# Patient Record
Sex: FEMALE | Race: WHITE | NOT HISPANIC OR LATINO | Employment: FULL TIME | URBAN - METROPOLITAN AREA
[De-identification: names, ages, dates, MRNs, and addresses within clinical notes are randomized per-mention and may not be internally consistent; named-entity substitution may affect disease eponyms.]

---

## 2018-08-30 PROBLEM — E66.813 OBESITY, CLASS III, BMI 40-49.9 (MORBID OBESITY): Status: ACTIVE | Noted: 2018-08-30

## 2018-08-30 PROBLEM — E66.01 OBESITY, CLASS III, BMI 40-49.9 (MORBID OBESITY) (HCC): Status: ACTIVE | Noted: 2018-08-30

## 2018-08-30 NOTE — ASSESSMENT & PLAN NOTE
-Discussed options of [facility]-Intensive Lifestyle Intervention Program, Very Low Calorie Diet-VLCD, Conservative Program, Liset-En-Y Gastric Bypass and Vertical Sleeve Gastrectomy and the role of weight loss medications   -Initial weight loss goal of 5-10% weight loss for improved health  -Screening labs   Recommend checking lab coverage before having labs drawn   -Tsh,cmp, lipid ordered  -Patient is interested in pursuing [facility]-Intensive Lifestyle Intervention Program

## 2018-08-30 NOTE — PROGRESS NOTES
Assessment/Plan:    Obesity, Class III, BMI 40-49 9 (morbid obesity) ([place] [address] )  -Discussed options of [facility]-Intensive Lifestyle Intervention Program, Very Low Calorie Diet-VLCD, Conservative Program, Liset-En-Y Gastric Bypass and Vertical Sleeve Gastrectomy and the role of weight loss medications   -Initial weight loss goal of 5-10% weight loss for improved health  -Screening labs  Recommend checking lab coverage before having labs drawn   -Tsh,cmp, lipid ordered  -Patient is interested in pursuing [facility]-Intensive Lifestyle Intervention Program    Diabetes mellitus ([place] [address] )  -Controlled with metformin and tradjenta  -should improve with weight loss, dietary, and lifestyle changes        At risk for sleep apnea  - sleep medicine referral placed   -Discussed risks of untreated sleep apnea such as sudden cardiac death by arrhythmia, uncontrolled hypertension, difficulty with weight loss, decreased quality sleep, increased insulin resistance, and stroke  -Should improve with dietary and lifestyle changes        Hypertension  -controlled with amlodipine, bisoprolol, edarbyclor  -should improve with weight loss, dietary, and lifestyle changes      Depression  -controlled with lamictal and effexor  -xanax used for sleeping       Follow up for vlcd start after labs are received  Patient to call if she does not hear about labs and scheduling vlcd start within a week  Would like to do healthy core after VLCD  List of counselors given to patient today  Asked patient to schedule an appt for counseling now so they can follow her throughout this journey  Diagnoses and all orders for this visit:    Obesity, Class III, BMI 40-49 9 (morbid obesity) ([facility])  -     TSH, 3rd generation with T4 reflex; Future  -     Comprehensive metabolic panel; Future  -     Lipid panel; Future  -     Ambulatory referral to Sleep Medicine;  Future    Type 2 diabetes mellitus with complication, without long-term current use of insulin ([place] [address] )  - TSH, 3rd generation with T4 reflex; Future  -     Comprehensive metabolic panel; Future  -     Lipid panel; Future  -     Ambulatory referral to Sleep Medicine; Future    Abnormal weight gain  -     TSH, 3rd generation with T4 reflex; Future  -     Comprehensive metabolic panel; Future  -     Lipid panel; Future  -     Ambulatory referral to Sleep Medicine; Future    At risk for obstructive sleep apnea  -     Ambulatory referral to Sleep Medicine; Future    Hypertension, unspecified type    Depression, unspecified depression type    Other orders  -     amLODIPine (NORVASC) 10 mg tablet; Take 10 mg by mouth  -     EDARBYCLOR 40-25 MG TABS;   -     albuterol (PROVENTIL HFA,VENTOLIN HFA) 90 mcg/act inhaler; Inhale  -     ALPRAZolam (XANAX) 0 25 mg tablet; alprazolam 0 25 mg tablet  -     bisoprolol (ZEBETA) 10 MG tablet;   -     glucose blood test strip; Check blood sugar QID  -     Loratadine (CLARITIN) 10 MG CAPS; Take by mouth  -     diclofenac (VOLTAREN) 75 mg EC tablet;   -     venlafaxine (EFFEXOR) 100 MG tablet; Take 225 mg by mouth daily  -     Sodium Hyaluronate (EUFLEXXA) 20 MG/2ML SOSY; knee injection  -     lamoTRIgine (LaMICtal) 200 MG tablet;   -     TRADJENTA 5 MG TABS;   -     metFORMIN (GLUCOPHAGE) 500 mg tablet; Take 500 mg by mouth  -     methylPREDNISolone acetate (DEPO-MEDROL) 40 mg/mL injection; 1 mL          Subjective:   Chief Complaint   Patient presents with   [address] [place] [address] patient here for MWM consult  Patient ID: Mile Genao  is a 50 y o  female with excess weight/obesity here to pursue weight management  Past Medical History:   Diagnosis Date    Acute bronchitis     Asthma     Diabetes mellitus ([facility] [address] )     Hypertension        HPI:  Obesity/Excess Weight:  Severity: Severe  Onset: For the last 25 years    Modifiers: Diet and Exercise, Physician Supervised Weight Loss Program, Commercial Weight Loss Programs-ie   Weight Watchers, [employer], [employer]ystem, etc  and Prescription Weight Loss Medications-phentermine   Contributing factors: Poor Food Choices, Insufficient Physical Activity and portion size and never feeling full and always wanting to eat   Associated symptoms: comorbid conditions, increased joint pain, decreased exercise capacity, decreased mobility and needs to lose weight because of excessive knee pain and possible knee replacement    Goals:180-200 lbs  Patient lost 90 pounds in 2010 through [email or web address]  Notes when he lost the weight she struggled with self image issues and notes she didn't feel like herself  She notes she felt vulnerable and exposed  She notes she felt like she wanted to regain the weight because she felt so out of her own body  She notes she feels like she will need counseling this time  Hydration: 5 bottles of water per day  3-5 cups of coffee per day with half and half  Alcohol: 2-3 drinks per month     The following portions of the patient's history were reviewed and updated as appropriate: allergies, current medications, past family history, past medical history, past social history, past surgical history and problem list     Review of Systems   HENT: Negative for sore throat  Respiratory: Negative for cough and shortness of breath  Cardiovascular: Negative for chest pain and palpitations  Gastrointestinal: Negative for abdominal pain, constipation, diarrhea, nausea and vomiting  Denies GERD   Endocrine: Negative for cold intolerance and heat intolerance  Genitourinary: Negative for dysuria  Musculoskeletal: Positive for arthralgias (bilateral knees )  Skin: Negative for rash  Neurological: Negative for headaches  Psychiatric/Behavioral: Negative for suicidal ideas (denies HI)          +depression --controlled  Denies anxiety       Objective:    /90 (BP Location: Left arm, Patient Position: Sitting, Cuff Size: Large)   Pulse 80   Temp 98 6 °F (37 °C) (Tympanic)   Resp 16   Ht 5' 4 5" (1 638 m) Wt 123 kg (270 lb 11 2 oz)   BMI 45 75 kg/m²     Physical Exam   Nursing note and vitals reviewed  Constitutional   General appearance: Abnormal   well developed and morbidly obese  Eyes No conjunctival pallor  Ears, Nose, Mouth, and Throat Oral mucosa moist    Pulmonary   Respiratory effort: No increased work of breathing or signs of respiratory distress  Auscultation of lungs: Clear to auscultation, equal breath sounds bilaterally, no wheezes, no rales, no rhonci  Cardiovascular   Auscultation of heart: Normal rate and rhythm, normal S1 and S2, without murmurs  Examination of extremities for edema and/or varicosities: Normal   no edema  Abdomen   Abdomen: Abnormal   The abdomen was obese  Bowel sounds were normal  The abdomen was soft and nontender  Musculoskeletal   Gait and station: Normal     Psychiatric   Orientation to person, place and time: Normal     Affect: appropriate    45 minute visit, >50% time spent counseling patient on surgical and nonsurgical interventions for the treatment of excess weight  Discussed the advantages and long-term outcomes with regards to bariatric surgery  Discussed in detail nonsurgical options including intensive lifestyle intervention program, very low-calorie diet program and conservative program   Discussed the role of weight loss medications  Counseled patient on diet behavior and exercise modification for weight loss

## 2018-08-31 ENCOUNTER — OFFICE VISIT (OUTPATIENT)
Dept: BARIATRICS | Facility: CLINIC | Age: 53
End: 2018-08-31
Payer: COMMERCIAL

## 2018-08-31 VITALS
RESPIRATION RATE: 16 BRPM | WEIGHT: 270.7 LBS | DIASTOLIC BLOOD PRESSURE: 90 MMHG | HEIGHT: 65 IN | SYSTOLIC BLOOD PRESSURE: 132 MMHG | TEMPERATURE: 98.6 F | HEART RATE: 80 BPM | BODY MASS INDEX: 45.1 KG/M2

## 2018-08-31 DIAGNOSIS — E11.8 TYPE 2 DIABETES MELLITUS WITH COMPLICATION, WITHOUT LONG-TERM CURRENT USE OF INSULIN (HCC): ICD-10-CM

## 2018-08-31 DIAGNOSIS — I10 HYPERTENSION, UNSPECIFIED TYPE: ICD-10-CM

## 2018-08-31 DIAGNOSIS — E66.01 OBESITY, CLASS III, BMI 40-49.9 (MORBID OBESITY) (HCC): Primary | ICD-10-CM

## 2018-08-31 DIAGNOSIS — F32.A DEPRESSION, UNSPECIFIED DEPRESSION TYPE: ICD-10-CM

## 2018-08-31 DIAGNOSIS — Z91.89 AT RISK FOR OBSTRUCTIVE SLEEP APNEA: ICD-10-CM

## 2018-08-31 DIAGNOSIS — R63.5 ABNORMAL WEIGHT GAIN: ICD-10-CM

## 2018-08-31 PROBLEM — E11.9 DIABETES MELLITUS (HCC): Status: ACTIVE | Noted: 2018-08-31

## 2018-08-31 PROCEDURE — 99204 OFFICE O/P NEW MOD 45 MIN: CPT | Performed by: PHYSICIAN ASSISTANT

## 2018-08-31 RX ORDER — BISOPROLOL FUMARATE 10 MG/1
TABLET ORAL DAILY
Status: ON HOLD | COMMUNITY
Start: 2018-08-14 | End: 2019-10-21 | Stop reason: ALTCHOICE

## 2018-08-31 RX ORDER — LORATADINE 10 MG/1
CAPSULE, LIQUID FILLED ORAL AS NEEDED
COMMUNITY
End: 2019-10-09

## 2018-08-31 RX ORDER — LAMOTRIGINE 200 MG/1
200 TABLET ORAL DAILY
COMMUNITY
Start: 2018-06-14

## 2018-08-31 RX ORDER — ALBUTEROL SULFATE 90 UG/1
2 AEROSOL, METERED RESPIRATORY (INHALATION) EVERY 4 HOURS PRN
COMMUNITY

## 2018-08-31 RX ORDER — DICLOFENAC SODIUM 75 MG/1
75 TABLET, DELAYED RELEASE ORAL DAILY
COMMUNITY
Start: 2018-08-29 | End: 2020-01-31

## 2018-08-31 RX ORDER — ALPRAZOLAM 0.25 MG/1
0.25 TABLET ORAL
COMMUNITY

## 2018-08-31 RX ORDER — AZILSARTAN KAMEDOXOMIL AND CHLORTHALIDONE 40; 25 MG/1; MG/1
1 TABLET ORAL DAILY
Status: ON HOLD | COMMUNITY
Start: 2018-05-27 | End: 2019-10-21 | Stop reason: ALTCHOICE

## 2018-08-31 RX ORDER — VENLAFAXINE 100 MG/1
225 TABLET ORAL DAILY
COMMUNITY
End: 2018-10-19 | Stop reason: ALTCHOICE

## 2018-08-31 RX ORDER — HYALURONATE SODIUM 10 MG/ML
SYRINGE (ML) INTRAARTICULAR
COMMUNITY

## 2018-08-31 RX ORDER — METHYLPREDNISOLONE ACETATE 40 MG/ML
1 INJECTION, SUSPENSION INTRA-ARTICULAR; INTRALESIONAL; INTRAMUSCULAR; SOFT TISSUE
COMMUNITY
End: 2018-10-19 | Stop reason: ALTCHOICE

## 2018-08-31 RX ORDER — LINAGLIPTIN 5 MG/1
TABLET, FILM COATED ORAL
COMMUNITY
Start: 2018-08-14 | End: 2018-10-19 | Stop reason: ALTCHOICE

## 2018-08-31 RX ORDER — AMLODIPINE BESYLATE 10 MG/1
10 TABLET ORAL DAILY
Status: ON HOLD | COMMUNITY
End: 2019-10-21 | Stop reason: ALTCHOICE

## 2018-08-31 NOTE — ASSESSMENT & PLAN NOTE
-controlled with amlodipine, bisoprolol, edarbyclor  -should improve with weight loss, dietary, and lifestyle changes

## 2018-08-31 NOTE — ASSESSMENT & PLAN NOTE
-Controlled with metformin and tradjenta  -should improve with weight loss, dietary, and lifestyle changes

## 2018-08-31 NOTE — ASSESSMENT & PLAN NOTE
- sleep medicine referral placed   -Discussed risks of untreated sleep apnea such as sudden cardiac death by arrhythmia, uncontrolled hypertension, difficulty with weight loss, decreased quality sleep, increased insulin resistance, and stroke    -Should improve with dietary and lifestyle changes

## 2018-09-04 ENCOUNTER — LAB (OUTPATIENT)
Dept: LAB | Facility: HOSPITAL | Age: 53
End: 2018-09-04
Payer: COMMERCIAL

## 2018-09-04 ENCOUNTER — TRANSCRIBE ORDERS (OUTPATIENT)
Dept: ADMINISTRATIVE | Facility: HOSPITAL | Age: 53
End: 2018-09-04

## 2018-09-04 DIAGNOSIS — E66.01 CLASS 3 SEVERE OBESITY DUE TO EXCESS CALORIES WITHOUT SERIOUS COMORBIDITY WITH BODY MASS INDEX (BMI) OF 45.0 TO 49.9 IN ADULT (HCC): ICD-10-CM

## 2018-09-04 DIAGNOSIS — E78.2 MIXED HYPERLIPIDEMIA: ICD-10-CM

## 2018-09-04 DIAGNOSIS — R63.5 ABNORMAL WEIGHT GAIN: Primary | ICD-10-CM

## 2018-09-04 DIAGNOSIS — R63.5 ABNORMAL WEIGHT GAIN: ICD-10-CM

## 2018-09-04 DIAGNOSIS — E11.8 TYPE 2 DIABETES MELLITUS WITH COMPLICATION, WITHOUT LONG-TERM CURRENT USE OF INSULIN (HCC): ICD-10-CM

## 2018-09-04 DIAGNOSIS — E78.2 MIXED HYPERLIPIDEMIA: Primary | ICD-10-CM

## 2018-09-04 DIAGNOSIS — E11.9 TYPE 2 DIABETES MELLITUS WITHOUT COMPLICATION, WITHOUT LONG-TERM CURRENT USE OF INSULIN (HCC): ICD-10-CM

## 2018-09-04 LAB
ALBUMIN SERPL BCP-MCNC: 3.8 G/DL (ref 3.5–5.7)
ALP SERPL-CCNC: 50 U/L (ref 40–150)
ALT SERPL W P-5'-P-CCNC: 21 U/L (ref 7–52)
ANION GAP SERPL CALCULATED.3IONS-SCNC: 10 MMOL/L (ref 4–13)
AST SERPL W P-5'-P-CCNC: 12 U/L (ref 13–39)
BILIRUB SERPL-MCNC: 0.3 MG/DL (ref 0.2–1)
BUN SERPL-MCNC: 20 MG/DL (ref 7–25)
CALCIUM SERPL-MCNC: 9.6 MG/DL (ref 8.6–10.5)
CHLORIDE SERPL-SCNC: 100 MMOL/L (ref 98–107)
CHOLEST SERPL-MCNC: 250 MG/DL (ref 0–200)
CO2 SERPL-SCNC: 30 MMOL/L (ref 21–31)
CREAT SERPL-MCNC: 0.67 MG/DL (ref 0.6–1.2)
EST. AVERAGE GLUCOSE BLD GHB EST-MCNC: 148 MG/DL
GFR SERPL CREATININE-BSD FRML MDRD: 103 ML/MIN/1.73SQ M
GLUCOSE P FAST SERPL-MCNC: 158 MG/DL (ref 65–99)
HBA1C MFR BLD: 6.8 % (ref 4.2–6.3)
HDLC SERPL-MCNC: 42 MG/DL (ref 40–60)
LDLC SERPL CALC-MCNC: 178 MG/DL (ref 0–100)
NONHDLC SERPL-MCNC: 208 MG/DL
POTASSIUM SERPL-SCNC: 3.6 MMOL/L (ref 3.5–5.5)
PROT SERPL-MCNC: 7 G/DL (ref 6.4–8.9)
SODIUM SERPL-SCNC: 140 MMOL/L (ref 134–143)
TRIGL SERPL-MCNC: 149 MG/DL (ref 44–166)
TSH SERPL DL<=0.05 MIU/L-ACNC: 2.92 UIU/ML (ref 0.45–5.33)

## 2018-09-04 PROCEDURE — 83036 HEMOGLOBIN GLYCOSYLATED A1C: CPT

## 2018-09-04 PROCEDURE — 36415 COLL VENOUS BLD VENIPUNCTURE: CPT

## 2018-09-04 PROCEDURE — 80061 LIPID PANEL: CPT

## 2018-09-04 PROCEDURE — 80053 COMPREHEN METABOLIC PANEL: CPT

## 2018-09-04 PROCEDURE — 84443 ASSAY THYROID STIM HORMONE: CPT

## 2018-09-17 ENCOUNTER — TELEPHONE (OUTPATIENT)
Dept: BARIATRICS | Facility: CLINIC | Age: 53
End: 2018-09-17

## 2018-09-17 NOTE — TELEPHONE ENCOUNTER
Please call patient and inform her that A1C is elevated at 6 8  She should continue follow up and management with pcp  Also patients cholesterol and LDL were elevated  She should discuss management and treatment with pcp  Patient had tsh and cmp within acceptable limits except for elevated glucose which is known due to elevated A1C  Recommend the patient follow a low fat, low cholesterol diet, limiting refined carbohydrates like white bread, white rice, white pasta, chips/pretzels, sweets/desserts, and sugary beverages  Increase fruits/vegetables  Increase exercise as tolerated

## 2018-09-17 NOTE — TELEPHONE ENCOUNTER
Please see telephone encounter  Labs were not forwarded to me  Please apologize to her for me and thank her for following up

## 2018-09-17 NOTE — TELEPHONE ENCOUNTER
I called her and reviewed results, she mentioned that you wanted her to see a dietitian after labs were reviewed  I transferred her to make that appointment

## 2018-09-17 NOTE — TELEPHONE ENCOUNTER
Patient called for information on her labs that were done on 9/4/18  She was instructed to call if she had not heard back from office   I forwarded message to Dominique UREÑA

## 2018-09-18 ENCOUNTER — OFFICE VISIT (OUTPATIENT)
Dept: BARIATRICS | Facility: CLINIC | Age: 53
End: 2018-09-18

## 2018-09-18 VITALS — HEIGHT: 65 IN | BODY MASS INDEX: 45.35 KG/M2 | WEIGHT: 272.2 LBS

## 2018-09-18 DIAGNOSIS — R63.5 ABNORMAL WEIGHT GAIN: ICD-10-CM

## 2018-09-18 PROCEDURE — RECHECK

## 2018-09-18 PROCEDURE — VLCD

## 2018-09-18 NOTE — PROGRESS NOTES
Initial RD session for VLCD completed  Components of diet discussed including ketosis, hydration, possible side effects  2 weeks of product ordered and received  She will hold trajenta, continue metformin and BP meds per PA  She was instructed to take her BP and notify us if <100/60  She was instructed to check her blood sugars TID and notify us if <70 mg/dl or >150 mg/dl consistently  Will f/u via phone on 9/20/18

## 2018-09-20 ENCOUNTER — PATIENT OUTREACH (OUTPATIENT)
Dept: BARIATRICS | Facility: CLINIC | Age: 53
End: 2018-09-20

## 2018-10-02 ENCOUNTER — OFFICE VISIT (OUTPATIENT)
Dept: BARIATRICS | Facility: CLINIC | Age: 53
End: 2018-10-02

## 2018-10-02 VITALS
DIASTOLIC BLOOD PRESSURE: 64 MMHG | WEIGHT: 262.6 LBS | SYSTOLIC BLOOD PRESSURE: 130 MMHG | BODY MASS INDEX: 43.75 KG/M2 | HEIGHT: 65 IN

## 2018-10-02 DIAGNOSIS — E11.69 DIABETES MELLITUS TYPE 2 IN OBESE (HCC): ICD-10-CM

## 2018-10-02 DIAGNOSIS — R63.5 ABNORMAL WEIGHT GAIN: Primary | ICD-10-CM

## 2018-10-02 DIAGNOSIS — I10 HYPERTENSION, UNSPECIFIED TYPE: ICD-10-CM

## 2018-10-02 DIAGNOSIS — E66.9 DIABETES MELLITUS TYPE 2 IN OBESE (HCC): ICD-10-CM

## 2018-10-02 PROCEDURE — VLCD

## 2018-10-02 PROCEDURE — RECHECK

## 2018-10-02 NOTE — PROGRESS NOTES
Weight Management Medical Nutrition Assessment   Is here for VLCD f/u  Current wt: 262 6 lbs  Loss of 9 6 lbs x 2 weeks  Over the last few days dog began to decline and they had to put her down  She found that this caused her to eat  She didn't like one of the shakes & then ran out  Went to [email or web address] & ate pizza  Would like to try again  Not always taking metformin at night (this is not new for her)  BS  g/dl  Not checking BP because couldn't find her cuff  Encouraged her to look for this  WNL at this visit  Labs ordered  Hydration adequate  Will continue VLCD at this time  Anthropometric Measurements  Start Weight (lbs): 272 2 lbs  Current Weight (lbs): 262 6 lbs   TBW % Change from start weight: 3 5%  Ideal Body Weight (lbs): 120 lbs  Goal Weight (lbs):    Weight Loss History  Previous weight loss attempts: Commercial Programs (Weight Watchers, [employer], etc )  Counseling with  MD  phentermine    Food and Nutrition Related History  Wake up: 6:30-8  Bed Time:    Food Recall  Following VLCD    Beverages: water and coffee/tea  Volume of beverage intake: >80 oz    Weekends: Same  Cravings: n/a  Trouble area of day: n/a    Frequency of Eating out: n/a  Food restrictions:carbs <50 gm while on VLCD  Cooking: self   Food Shopping: self    Physical Activity Intake  Activity:n/a  Frequency:n/a  Physical limitations/barriers to exercise: no intense exercise while on VLCD    Estimated Needs  Energy     [employer] Needs: BMR :6816  2# loss weekly sedentary:  1159              Protein:67-84 gm      (1 2-1 5g/kg IBW)  Fluid: 65 oz     (35mL/kg IBW)    Nutrition Diagnosis  Yes;     Overweight/obesity  related to Excess energy intake as evidenced by  BMI more than normative standard for age and sex (obesity-grade III 36+)       Nutrition Intervention    Nutrition Prescription  Calories:760  Protein: 96 gm  Fluid: 80 oz    Meal Plan  VLCD    Nutrition Education:    VLCD     Nutrition Counseling:  Strategies: meal planning, portion sizes, healthy snack choices, hydration, fiber intake, protein intake, exercise, food journal      Monitoring and Evaluation:  Evaluation criteria:  Energy Intake  Meet protein needs  Maintain adequate hydration  Monitor weekly weight     Barriers to learning:none  Readiness to change: Action  Comprehension: very good  Expected Compliance: very good

## 2018-10-16 ENCOUNTER — OFFICE VISIT (OUTPATIENT)
Dept: BARIATRICS | Facility: CLINIC | Age: 53
End: 2018-10-16

## 2018-10-16 VITALS — BODY MASS INDEX: 44.42 KG/M2 | WEIGHT: 266.6 LBS | HEIGHT: 65 IN

## 2018-10-16 DIAGNOSIS — R63.5 ABNORMAL WEIGHT GAIN: ICD-10-CM

## 2018-10-16 PROCEDURE — VLCD

## 2018-10-16 PROCEDURE — RECHECK

## 2018-10-16 NOTE — PROGRESS NOTES
Weight Management Medical Nutrition Assessment   Is here for VLCD f/u  Current wt: 266 6  lbs  Gain of 4 lbs x 2 weeks & overall loss of 5 6 lbs x 4 wks  Reports having difficulty from midafternoon on and then eating regular food  Asking to try VLCD again  Explain that she does not qualify for VLCD as she has been unable to be compliant and has actually gained weight on VLCD  Discussed that this is not the only way to lose weight and based on what she is describing it is clear that a more realistic less restrictive approach is necessary  Explained that she can do a partial replacement program at this time  She was agreeable to this  Meal plan provided  BS 89-127g/dl  Has been checking BP and is WNL  She is currently trying to get an appointment to see a [address] [place] [address] therapist  Would like to continue to be seen every 2 weeks for more support  She is also asking if she can take phentermine as she used this in the past and it helped her to manage evenings  Explain that this is something she can address with PA at next visit  It is also noted that while on VLCD she was to hold tradjenta  She has held this and blood sugars range  mg/dl while being noncompliant on VLCD  Will discuss if this needs to be resumed with PA  Anthropometric Measurements  Start Weight (lbs): 272 2 lbs  Current Weight (lbs): 266 6  lbs   TBW % Change from start weight: 2%  Ideal Body Weight (lbs): 120 lbs  Goal Weight (lbs):    Weight Loss History  Previous weight loss attempts: Commercial Programs ([employer] Watchers, [employer], etc )  Counseling with  MD  phentermine    Food and Nutrition Related History  Wake up: 6:30-8  Bed Time:    Food Recall  Following VLCD in the a m   And then eating large portions dinner and evening    Beverages: water and coffee/tea  Volume of beverage intake: >80 oz    Weekends: Same  Cravings: n/a  Trouble area of day: dinner and on  Frequency of Eating out: n/a  Food restrictions:n/a  Cooking: self   Food Shopping: self    Physical Activity Intake  Activity:n/a  Frequency:n/a  Physical limitations/barriers to exercise: n/a    Estimated Needs  Energy  [employer] Needs: BMR :6632, 1-2# loss weekly sedentary:   4691-6728              Protein:67-84 gm      (1 2-1 5g/kg IBW)  Fluid: 65 oz     (35mL/kg IBW)    Nutrition Diagnosis  Yes;     Overweight/obesity  related to Excess energy intake as evidenced by  BMI more than normative standard for age and sex (obesity-grade III 36+)       Nutrition Intervention    Nutrition Prescription  Calories:9183-6141  Protein: ~100 gm  Fluid: 80 oz    Meal Plan  B: 200, 27  S: 100-160, 5-15  L: 200, 27  S: 100-160, 5-15  D: 400-500, 288-42  S: 100-150, 5-10    Nutrition Education:    Portion sizes, healthy snacks, hydration, exercise     Nutrition Counseling:  Strategies: meal planning, portion sizes, healthy snack choices, hydration, fiber intake, protein intake, exercise, food journal      Monitoring and Evaluation:  Evaluation criteria:  Energy Intake  Meet protein needs  Maintain adequate hydration  Monitor weekly weight     Barriers to learning:none  Readiness to change: Action  Comprehension: very good  Expected Compliance: good

## 2018-10-17 ENCOUNTER — TELEPHONE (OUTPATIENT)
Dept: BARIATRICS | Facility: CLINIC | Age: 53
End: 2018-10-17

## 2018-10-17 NOTE — TELEPHONE ENCOUNTER
----- Message from Shanna Paredes PA-C sent at 10/16/2018  2:36 PM EDT -----  Please call patient and inform her I would like her to restart tradjenta because of her A1C value from 9/4/2018  We can recheck her a1c in 3 months after she loses more weight and if A1C improved at that time we can consider coming off of the medication  Thank you  ----- Message -----  From: Mikaela Pro RD  Sent: 10/16/2018   1:38 PM  To: Shanna Paredes PA-C    Patient was to hold tradjenta while on VLCD  Completed 4 weeks of VLCD though noncompliant for the duration and actually had a weight gain this time  She is now transitioned to partial replacement  Blood sugars have been  mg/dl  Does she need to restart or can she stay off the tradjenta? I told her if you wanted her to restart it then we would contact her   Thanks,

## 2018-10-19 ENCOUNTER — OFFICE VISIT (OUTPATIENT)
Dept: SLEEP CENTER | Facility: CLINIC | Age: 53
End: 2018-10-19
Payer: COMMERCIAL

## 2018-10-19 VITALS
SYSTOLIC BLOOD PRESSURE: 132 MMHG | HEIGHT: 65 IN | WEIGHT: 268 LBS | DIASTOLIC BLOOD PRESSURE: 78 MMHG | HEART RATE: 63 BPM | BODY MASS INDEX: 44.65 KG/M2 | RESPIRATION RATE: 16 BRPM

## 2018-10-19 DIAGNOSIS — E66.01 OBESITY, CLASS III, BMI 40-49.9 (MORBID OBESITY) (HCC): ICD-10-CM

## 2018-10-19 DIAGNOSIS — E11.8 TYPE 2 DIABETES MELLITUS WITH COMPLICATION, WITHOUT LONG-TERM CURRENT USE OF INSULIN (HCC): ICD-10-CM

## 2018-10-19 DIAGNOSIS — R63.5 ABNORMAL WEIGHT GAIN: ICD-10-CM

## 2018-10-19 DIAGNOSIS — G47.33 OSA (OBSTRUCTIVE SLEEP APNEA): Primary | ICD-10-CM

## 2018-10-19 PROCEDURE — 99204 OFFICE O/P NEW MOD 45 MIN: CPT | Performed by: PSYCHIATRY & NEUROLOGY

## 2018-10-19 RX ORDER — VENLAFAXINE HYDROCHLORIDE 150 MG/1
225 CAPSULE, EXTENDED RELEASE ORAL DAILY
COMMUNITY
Start: 2018-09-06 | End: 2019-10-09

## 2018-10-19 RX ORDER — VENLAFAXINE HYDROCHLORIDE 75 MG/1
75 CAPSULE, EXTENDED RELEASE ORAL DAILY
COMMUNITY
Start: 2018-09-06

## 2018-10-19 NOTE — PATIENT INSTRUCTIONS
1   Schedule for on a polysomnogram  2  Titrate nasal CPAP if appropriate  3  Return to the Sleep [address] [place] [address] following testing for review of the results  4  [place] treatment as needed  5    Contact [facility] if any problems arise prior to testing

## 2018-10-19 NOTE — PROGRESS NOTES
Consultation - [address], 1965, MRN: [id number]    10/19/2018        Reason for Consult / Principal Problem:    1  Episodes of extreme sleepiness  2   Episodes of persistent sleep lasting many hours  3  Loud snoring  4  Obesity  5  Possible obstructive sleep apnea     Subjective:     HPI: Surendra Duran is a 50y o  year old female  She describes episodes of extreme tiredness and need to sleep for up to 36 hr without interruption except to use the bathroom  During that time she is very somnolent, if awakened she will continue to feel extreme sleepiness and return to sleep without significant difficulty  Episodes occur approximately every 6 to 8 weeks  The problem began 2-3 years ago  She denies any instances of prolonged sleep or abnormal behavior as a child or young adult  His snoring is reportedly loud according to her daughter  The patient is unaware of run snoring  Employment:  She is currently a  for a [employer]    Sleep Schedule:       Bedtime:  11:00 p m  to midnight on work day, between 11:00 p m  and 1:00 a m  on a non work day      Latency:  5-10 minutes unless disturbed by her  snoring      Wakeup time:  6:30 a m  approximately 3 days a week to go to the office and 8:00 a m  on days when she works from home, 10:00 a m  to 11:00 a m  on days when she is not working    Awakenings:       Frequency:  Approximately 3 times a night      Causes:  Using the bathroom or if disturbed by her       Duration:  Very brief unless disturb I have spent    Daytime Sleepiness / Inappropriate Sleep:       Most severe:  3 to 4 p m  Naps :  3 to 4 times a week when not working in the office      Time:  4:00 p m  to 5:00 p m        Duration:  1-2 hours       Inappropriate drowsiness / sleep:  Typically not, however, she may take a very brief period of sleep during relatively inactive periods    Snoring: Reportedly very loud according to daughter    Apnea:  Denied    Change in Weight:  No significant change in the past year    Restless Leg Syndrome:  Some clinical symptoms consistent with this diagnosis  Approximately 3 or 4 times a year she may experience dysesthesias in the thighs bilaterally when sitting for long periods of time  This may occur when she is in a plane or on long car rides  Dysesthesias of described as Luisa Pillion and are usually associated with a desire to walk around  Movement tends to improve the dysesthesias  This only occurs once or twice a year and a                                    Other Complaints:  Type 2 diabetes over the past year, hypertension, bipolar type 2, environmental allergies, depression, osteoarthritis of both knees     Social History:      Caffeine:  3 or 4 months of coffee daily, occasional cola             Tobacco:   reports that she has never smoked  She has never used smokeless tobacco        Alcohol:   reports that she drinks alcohol  Drugs:   reports that she does not use drugs  The review of systems and following portions of the patient's history were reviewed and updated as appropriate: allergies, current medications, past family history, past medical history, past social history, past surgical history and problem list     Review of Systems      Genitourinary need to urinate more than twice a night   Cardiology none   Gastrointestinal none   Neurology forgetfulness, poor concentration or confusion,  and difficulty with memory   Constitutional fatigue   Integumentary itching   Psychiatry aggressiveness or irritability   Musculoskeletal joint pain   Pulmonary snoring   ENT none   Endocrine frequent urination   Hematological none       Objective:       Vitals:    10/19/18 1000   BP: 132/78   BP Location: Left arm   Cuff Size: Large   Pulse: 63   Resp: 16   Weight: 122 kg (268 lb)   Height: 5' 5" (1 651 m)     Body mass index is 44 6 kg/m²    Neck Circumference: 39  [place] Sleepiness Scale: Total score: 6      Current Outpatient Prescriptions:     albuterol (PROVENTIL HFA,VENTOLIN HFA) 90 mcg/act inhaler, Inhale, Disp: , Rfl:     ALPRAZolam (XANAX) 0 25 mg tablet, alprazolam 0 25 mg tablet, Disp: , Rfl:     amLODIPine (NORVASC) 10 mg tablet, Take 10 mg by mouth, Disp: , Rfl:     bisoprolol (ZEBETA) 10 MG tablet, , Disp: , Rfl:     diclofenac (VOLTAREN) 75 mg EC tablet, , Disp: , Rfl:     EDARBYCLOR 40-25 MG TABS, , Disp: , Rfl:     glucose blood test strip, Check blood sugar QID, Disp: , Rfl:     lamoTRIgine (LaMICtal) 200 MG tablet, , Disp: , Rfl:     Loratadine (CLARITIN) 10 MG CAPS, Take by mouth, Disp: , Rfl:     metFORMIN (GLUCOPHAGE) 500 mg tablet, Take 500 mg by mouth, Disp: , Rfl:     Sodium Hyaluronate (EUFLEXXA) 20 MG/2ML SOSY, knee injection, Disp: , Rfl:     venlafaxine (EFFEXOR-XR) 150 mg 24 hr capsule, , Disp: , Rfl:     venlafaxine (EFFEXOR-XR) 75 mg 24 hr capsule, , Disp: , Rfl:     Physical Exam  General Appearance:   Alert, cooperative, no distress, appears stated age, morbidly obese     Head:   Normocephalic, without obvious abnormality, atraumatic     Eyes:   PERRL, conjunctiva/corneas clear, EOM's intact          Nose:  Nares normal, septum midline, mucosa normal, no drainage or sinus tenderness           Throat:  Lips, teeth and gums normal; tongue normal size and  shape and midline in position; mucosa significantly redundant bilaterally approaching the base of the tongue, uvula thick and dipping to the base of the tongue, tonsils somewhat visible and appear mildly enlarged bilaterally, Mallampati class 4        Neck:  Supple, symmetrical, trachea midline, no adenopathy;  Thyroid: No enlargement, tenderness or nodules; no carotid bruit or JVD     Lungs:      Clear to auscultation bilaterally, respirations unlabored     Heart:   Regular rate and rhythm, S1 and S2 normal, no murmur, rub or gallop       Extremities: Extremities normal, atraumatic, no cyanosis or edema     Pulses:  2+ and symmetric all extremities     Skin:  Skin color, texture, turgor normal, no rashes or lesions       Neurologic:  CNII-XII intact  Normal strength, sensation throughout      Sleep Study Results:  None available present      ASSESSMENT / PLAN     1  ADAM (obstructive sleep apnea)  Ambulatory referral to Sleep Medicine    CPAP Study    Diagnostic Sleep Study   2  Obesity, Class III, BMI 40-49 9 (morbid obesity) ([facility] )  Ambulatory referral to Sleep Medicine   3  Type 2 diabetes mellitus with complication, without long-term current use of insulin ([facility] )  Ambulatory referral to Sleep Medicine   4  Abnormal weight gain  Ambulatory referral to Sleep Medicine         Counseling / Coordination of Care  Total clinic time spent today 45 minutes  Greater than 50% of total time was spent with the patient and / or family counseling and / or coordination of care  A description of the counseling / coordination of care:     risk factor reductions, prognosis, patient and family education, impressions and risks and benefits of treatment options    The following instructions have been given to the patient today:    Patient Instructions   1  Schedule for on a polysomnogram  2  Titrate nasal CPAP if appropriate  3  Return to the [facility] following testing for review of the results  4  [place] treatment as needed  5    Contact [facility] if any problems arise prior to testing          Vern Holloway

## 2018-10-19 NOTE — PROGRESS NOTES
Review of Systems      Genitourinary need to urinate more than twice a night   Cardiology none   Gastrointestinal none   Neurology forgetfulness, poor concentration or confusion,  and difficulty with memory   Constitutional fatigue   Integumentary itching   Psychiatry aggressiveness or irritability   Musculoskeletal joint pain   Pulmonary snoring   ENT none   Endocrine frequent urination   Hematological none

## 2018-10-29 NOTE — PROGRESS NOTES
Weight Management Medical Nutrition Assessment  Rosita presented for 1/3 meal planning session  Today's weight is 262 2#  She has lost 4 4#  in the past 2 weeks and overall she has lost 10# in the past 6 weeks  She has made many dietary changes including interval eating, reducing portion sizes at snacks and meals  Patient stated she still struggles with sweet cravings mainly at night  We discussed planning her night snack and logging it early in the day to gain control of that period of her day  She is not formally food journaling and will start manually tracking this week  FSBS in 115-130 range per patient    Anthropometric Measurements  Start Weight (lbs):272 2#  Current Weight (lbs): 262 2#  TBW % Change from start weight:4%  Ideal Body Weight (lbs):120#  Goal Weight (lbs):ST#      LT#     Weight Loss History  Previous weight loss attempts: Commercial Programs (Weight Watchers, Farrel Angles, etc )  Counseling with  MD  Phentermine    Food and Nutrition Related History  Food Recall  Breakfast:Shake  Coffee /2 /2     Snack:protein bar  Lunch:Salad with grilled chicken 2 x week or bar / fruit  Snack:fruit or cheese stick   Dinner:lean cuisine / Ariadna Massa meals- 500-600 calories   Snack:sweet carb       Beverages: water  Volume of beverage intake: 60-80oz    Weekends: Worse, boredome- snack more  Cravings: sweets  Trouble area of day:nighttime    Frequency of Eating out: irregularly  Food restrictions:none  Cooking: self   Food Shopping: self    Physical Activity Intake  Activity:none at the moment- has eliptical  Frequency:infrequently  Physical limitations/barriers to exercise: knee pain    Estimated Needs  Energy    [employer] Needs: BMR : 1789 calories    1-2# loss weekly sedentary:  5716-9906 calories              1-2# loss weekly lightly active:5091-6140 calories   Protein:67-84gm    (1 2-1 5g/kg IBW)  Fluid: 65oz     (35mL/kg IBW)    Nutrition Diagnosis  Yes;     Overweight/obesity related to Excess energy intake as evidenced by  BMI more than normative standard for age and sex (obesity-grade III 36+)       Nutrition Intervention    Nutrition Prescription  Calories:6834-0972 calories   Protein:70-90grams daily   Fluid:80oz daily     Nutrition Education:      Calorie controlled menu  Lean protein food choices  Healthy snack options  Food journaling tips      Nutrition Counseling:  Strategies: meal planning, portion sizes, healthy snack choices, hydration, fiber intake, protein intake, exercise, food journal      Monitoring and Evaluation:  Evaluation criteria:  Energy Intake  Meet protein needs  Maintain adequate hydration  Monitor weekly weight  Meal planning/preparation  Food journal   Decreased portions at mealtimes and snacks  Physical activity     Barriers to learning:none  Readiness to change: Action  Comprehension: good  Expected Compliance: good

## 2018-10-30 ENCOUNTER — OFFICE VISIT (OUTPATIENT)
Dept: BARIATRICS | Facility: CLINIC | Age: 53
End: 2018-10-30

## 2018-10-30 VITALS — WEIGHT: 262.2 LBS | BODY MASS INDEX: 44.76 KG/M2 | HEIGHT: 64 IN

## 2018-10-30 DIAGNOSIS — R63.5 ABNORMAL WEIGHT GAIN: ICD-10-CM

## 2018-10-30 PROCEDURE — RECHECK: Performed by: DIETITIAN, REGISTERED

## 2018-10-30 PROCEDURE — DB3PK: Performed by: DIETITIAN, REGISTERED

## 2018-11-09 ENCOUNTER — HOSPITAL ENCOUNTER (OUTPATIENT)
Dept: SLEEP CENTER | Facility: CLINIC | Age: 53
Discharge: HOME | End: 2018-11-09
Payer: COMMERCIAL

## 2018-11-09 DIAGNOSIS — G47.33 OSA (OBSTRUCTIVE SLEEP APNEA): ICD-10-CM

## 2018-11-09 PROCEDURE — 95810 POLYSOM 6/> YRS 4/> PARAM: CPT

## 2018-11-10 NOTE — PROGRESS NOTES
Sleep Study Documentation    Pre-Sleep Study       Sleep testing procedure explained to patient:YES    Patient napped prior to study:YES- more than 30 minutes  Napped after 2PM: yes    Caffeine:Dayshift worker after 12PM   Caffeine use:YES- soda  12 ounces    Alcohol:Dayshift workers after 5PM: Alcohol use:NO    Typical day for patient:YES       Study Documentation  Diagnostic   Snore: Moderate  Supplemental O2: no    O2 flow rate (L/min) range 0  O2 flow rate (L/min) final 0  Minimum SaO2 84 %  Baseline SaO2 92 8 %            EKG abnormalities: no     EEG abnormalities: no    Study Terminated:no    Patient classification: employed       Post-Sleep Study    Medication used at bedtime or during sleep study:NO    Patient reports time it took to fall asleep:greater than 60 minutes    Patient reports waking up during study:3 or more times  Patient reports returning to sleep in 10 to 30 minutes  Patient reports sleeping 2 to 4 hours without dreaming  Patient reports sleep during study:typical    Patient rated sleepiness: Somewhat sleepy or tired    PAP treatment:no

## 2018-11-12 NOTE — PROGRESS NOTES
Weight Management Medical Nutrition Assessment  Rosita presented for 2/3 meal planning session  Today's weight is 256 7#  She has lost 5 5#  in the past 2 weeks and overall she has lost 16# (6% TBW)  in the past 8 weeks  She stated she is not formally food journaling but estimated she was around 1200 calories  In the past when she has food journaled she found herself self sabotaging after the first week  She is weighing herself daily and stated she finds it motivating  She attempted t reach out to one [facility] specialist but wait too long so referred to another to address emotional eating component           Anthropometric Measurements  Start Weight (lbs):272 2#  Current Weight (lbs): 256 7#  TBW % Change from start weight:6%  Ideal Body Weight (lbs):120#  Goal Weight (lbs):ST#      LT#      Weight Loss History  Previous weight loss attempts: Commercial Programs (Weight Watchers, Sunitha Mayo, etc )  Counseling with  MD  Phentermine     Food and Nutrition Related History  Food Recall    Breakfast:yogurt or Shake  Snack:skip or bar  Lunch:office salad with protein  Or home-chili or shake   Snack:yogurt parfait   Dinner:snack or lean cuisine  - wkd actual meal  Snack:ice cream        Beverages: water  Volume of beverage intake: 60-80oz     Weekends: busier - less boredome  Cravings: sweets  Trouble area of day:nighttime     Frequency of Eating out: irregularly  Food restrictions:none  Cooking: self   Food Shopping: self     Physical Activity Intake  Activity:none at the moment  Frequency:infrequently  Physical limitations/barriers to exercise: knee pain- needs replacement      Estimated Needs  Energy     Bear Baker Energy Needs: BMR : 1764 calories    1-2# loss weekly sedentary: 1454-7166  calories              1-2# loss weekly lightly active: 1426-1926calories   Protein:67-84gm    (1 2-1 5g/kg IBW)  Fluid: 65oz     (35mL/kg IBW)     Nutrition Diagnosis  Yes;     Overweight/obesity  related to Excess energy intake as evidenced by  BMI more than normative standard for age and sex (obesity-grade III 36+)     Nutrition Intervention     Nutrition Prescription  Calories:1200 calories   Protein:70-90grams daily   Fluid:80oz daily      Nutrition Education:       Calorie controlled menu  Lean protein food choices  Healthy snack options  Food journaling tips        Nutrition Counseling:  Strategies: meal planning, portion sizes, healthy snack choices, hydration, fiber intake, protein intake, exercise, food journal        Monitoring and Evaluation:  Evaluation criteria:  Energy Intake  Meet protein needs  Maintain adequate hydration  Monitor weekly weight  Meal planning/preparation  Food journal   Decreased portions at mealtimes and snacks  Physical activity      Barriers to learning:none  Readiness to change: Action  Comprehension: good  Expected Compliance: good

## 2018-11-13 ENCOUNTER — OFFICE VISIT (OUTPATIENT)
Dept: BARIATRICS | Facility: CLINIC | Age: 53
End: 2018-11-13

## 2018-11-13 VITALS — WEIGHT: 256.7 LBS | HEIGHT: 64 IN | BODY MASS INDEX: 43.83 KG/M2

## 2018-11-13 DIAGNOSIS — R63.5 ABNORMAL WEIGHT GAIN: Primary | ICD-10-CM

## 2018-11-13 PROCEDURE — RECHECK: Performed by: DIETITIAN, REGISTERED

## 2018-11-14 ENCOUNTER — TELEPHONE (OUTPATIENT)
Dept: SLEEP CENTER | Facility: CLINIC | Age: 53
End: 2018-11-14

## 2018-11-14 NOTE — TELEPHONE ENCOUNTER
Called the patient with her sleep study results  She has Cpap study scheduled for 12/9/2018  Explained the reason for the Cpap study  Patient has f/u with Dr Vianey Tena and a DME appt on 12/27/2018

## 2018-12-07 ENCOUNTER — OFFICE VISIT (OUTPATIENT)
Dept: BARIATRICS | Facility: CLINIC | Age: 53
End: 2018-12-07
Payer: COMMERCIAL

## 2018-12-07 VITALS
WEIGHT: 262.44 LBS | RESPIRATION RATE: 14 BRPM | BODY MASS INDEX: 43.72 KG/M2 | TEMPERATURE: 97.4 F | DIASTOLIC BLOOD PRESSURE: 70 MMHG | HEART RATE: 58 BPM | SYSTOLIC BLOOD PRESSURE: 128 MMHG | HEIGHT: 65 IN

## 2018-12-07 DIAGNOSIS — I10 ESSENTIAL HYPERTENSION: ICD-10-CM

## 2018-12-07 DIAGNOSIS — E11.8 TYPE 2 DIABETES MELLITUS WITH COMPLICATION, WITHOUT LONG-TERM CURRENT USE OF INSULIN (HCC): ICD-10-CM

## 2018-12-07 DIAGNOSIS — E66.01 OBESITY, CLASS III, BMI 40-49.9 (MORBID OBESITY) (HCC): Primary | ICD-10-CM

## 2018-12-07 DIAGNOSIS — E88.81 DYSMETABOLIC SYNDROME X: ICD-10-CM

## 2018-12-07 DIAGNOSIS — G47.33 OSA (OBSTRUCTIVE SLEEP APNEA): ICD-10-CM

## 2018-12-07 PROBLEM — Z91.89 AT RISK FOR SLEEP APNEA: Status: RESOLVED | Noted: 2018-08-31 | Resolved: 2018-12-07

## 2018-12-07 PROBLEM — M25.569 KNEE PAIN: Status: ACTIVE | Noted: 2018-12-07

## 2018-12-07 PROCEDURE — 99214 OFFICE O/P EST MOD 30 MIN: CPT | Performed by: FAMILY MEDICINE

## 2018-12-07 RX ORDER — MELOXICAM 15 MG/1
TABLET ORAL
Refills: 0 | COMMUNITY
Start: 2018-11-04 | End: 2019-06-12 | Stop reason: ALTCHOICE

## 2018-12-07 RX ORDER — DOXYCYCLINE HYCLATE 100 MG/1
CAPSULE ORAL
Refills: 0 | COMMUNITY
Start: 2018-12-01 | End: 2019-03-18 | Stop reason: ALTCHOICE

## 2018-12-07 RX ORDER — AZELASTINE HYDROCHLORIDE 137 UG/1
1 SPRAY, METERED NASAL 2 TIMES DAILY
Refills: 0 | COMMUNITY
Start: 2018-12-01 | End: 2019-10-09

## 2018-12-07 NOTE — PROGRESS NOTES
Assessment/Plan:    No problem-specific Assessment & Plan notes found for this encounter  Diagnoses and all orders for this visit:    Obesity, Class III, BMI 40-49 9 (morbid obesity) ([facility] [facility] )  -     ECG 12 lead; Future  -    If EKG normal, plan for starting phentermine 37 5mg 1/2 tab before breakfast for 2-4 weeks, if tolerates will increase to 1 tab before breakfast or 1/2 tab bid depending on patients symptoms at the time, she will call to let me know how she is feeling in 2-4 weeks ani  Discussed common side effects including dry mouth, headaches, palpitations  Discussed with patient if she reaches a 5-10% weight loss on phentermine it is consider a long term medicine  Goal of 13lbs (5%) for next visit in 12 weeks  - discussed with patient also surgical weight loss as an option as her BMI is 44%, she is currently NOT interested in pursing surgery  ADAM (obstructive sleep apnea)  She already has apt for CPAP next week, encouraged compliance  If she reaches further weight loss sleep apnea could improve  Type 2 diabetes mellitus with complication, without long-term current use of insulin (HCC)  - continue metformin, has appointment with her PCP in January with recheck labs  - last A1c 6 8, stable, sugar logs range in the 120s  Follow up in approximately 2 weeks with Non-Surgical Dietician and 12 weeks with me  Goals:  Food log (ie ) www myfitnesspal com,sparkpeople  com,loseit com,calorieking  com,etc  baritastic  No sugary beverages  At least 64-80ozoz of water daily  Goal protein intake of 70-90 grams per day,   5-10 servings of fruits and vegetables per day and 25-35 grams of dietary fiber per day  Daily calorie goal 2100-1747  Increase physical activity by 10 minutes daily  Gradually increase physical activity to a goal of 5 days per week for 30 minutes of MODERATE intensity PLUS 2 days per week of FULL BODY resistance training      Subjective:   Chief Complaint   Patient presents with  Follow-up     Pt is here for MWM follow up  Patient ID: Jason Rodriguez  is a 48 y o  female with excess weight/obesity here to pursue weight management  Patient is pursuing Conservative Program + meal planning with RD  (previously on VLCD )    HPI  Patient presents for medical weight management follow-up  She was seen last month by the RD  She has gained 6 lb since last visit a month ago on 11/13/2018  Recent sinus infection for 1-2 weeks, now resolving got abx treatment, admits she felt cravings towards "comfort" foods  She also has been more sedentary as she has knee OA/pain  She is following with Ortho for this and is getting steroid injections every 3 months  She has been successful with wt loss in the past, she was in a weight loss program 8 years ago and with lifestyle modifications and phentermine she was able to loss 90lbs in 8 months  At that time she felt like she couldn't recognize herself and feels like it was a big change too quickly for her so she stopped the program and the phentermine as she wanted to gain weight again  She felt like her weight was her cover or baarier and she needed it back  Now she feels more prepared and plans for a slower weight loss so she can see the changes in her body but she will have more time to process it  Initial: 270lbs  Current: 262 7lbs  Change: -7 3  Change from last visit 11/13/18 : +6lbs     The following portions of the patient's history were reviewed and updated as appropriate: allergies, current medications, past family history, past medical history, past social history, past surgical history and problem list     Review of Systems   Constitutional: Negative for activity change and appetite change  Respiratory: Negative  Cardiovascular: Negative  Gastrointestinal: Negative  Genitourinary: Negative  Musculoskeletal: Negative for arthralgias  Skin: Negative for rash  Psychiatric/Behavioral: Negative  Objective:    /70 (BP Location: Left arm, Patient Position: Sitting, Cuff Size: Large)   Pulse 58   Temp (!) 97 4 °F (36 3 °C) (Tympanic)   Resp 14   Ht 5' 4 5" (1 638 m)   Wt 119 kg (262 lb 7 oz)   BMI 44 35 kg/m²     Waist circumference : 49     Physical Exam     Constitutional   General appearance: Abnormal   well developed and obese  Eyes No conjunctival pallor  Ears, Nose, Mouth, and Throat Oral mucosa moist    Pulmonary   Respiratory effort: No increased work of breathing or signs of respiratory distress  Auscultation of lungs: Clear to auscultation, equal breath sounds bilaterally, no wheezes, no rales, no rhonci  Cardiovascular   Auscultation of heart: Normal rate and rhythm, normal S1 and S2, without murmurs  Examination of extremities for edema and/or varicosities: Normal   no edema  Abdomen   Abdomen: Abnormal   The abdomen was obese  Bowel sounds were normal  The abdomen was soft and nontender     Musculoskeletal   Gait and station: Normal     Psychiatric   Orientation to person, place and time: Normal     Affect: appropriate

## 2018-12-07 NOTE — PATIENT INSTRUCTIONS
Food log (ie ) www myfitnesspal com,sparkpeople  com,loseit com,calorieking  com,etc   Increase physical activity by 10 minutes daily,   Goal protein intake of 60-80 grams per day, 5-10 servings of fruits and vegetables per day and 25-35 grams of dietary fiber per day  Daily calorie goal 1904-9379

## 2018-12-11 ENCOUNTER — LAB (OUTPATIENT)
Dept: LAB | Age: 53
End: 2018-12-11
Payer: COMMERCIAL

## 2018-12-11 DIAGNOSIS — E66.01 OBESITY, CLASS III, BMI 40-49.9 (MORBID OBESITY) (HCC): ICD-10-CM

## 2018-12-11 DIAGNOSIS — G47.33 OSA (OBSTRUCTIVE SLEEP APNEA): ICD-10-CM

## 2018-12-11 DIAGNOSIS — E11.8 TYPE 2 DIABETES MELLITUS WITH COMPLICATION, WITHOUT LONG-TERM CURRENT USE OF INSULIN (HCC): ICD-10-CM

## 2018-12-11 LAB
ATRIAL RATE: 53 BPM
P AXIS: 70 DEGREES
PR INTERVAL: 138 MS
QRS AXIS: -23 DEGREES
QRSD INTERVAL: 100 MS
QT INTERVAL: 480 MS
QTC INTERVAL: 450 MS
T WAVE AXIS: 25 DEGREES
VENTRICULAR RATE: 53 BPM

## 2018-12-11 PROCEDURE — 93005 ELECTROCARDIOGRAM TRACING: CPT

## 2018-12-11 PROCEDURE — 93010 ELECTROCARDIOGRAM REPORT: CPT | Performed by: INTERNAL MEDICINE

## 2018-12-14 ENCOUNTER — TELEPHONE (OUTPATIENT)
Dept: SLEEP CENTER | Facility: CLINIC | Age: 53
End: 2018-12-14

## 2018-12-14 ENCOUNTER — TELEPHONE (OUTPATIENT)
Dept: BARIATRICS | Facility: CLINIC | Age: 53
End: 2018-12-14

## 2018-12-14 DIAGNOSIS — G47.33 OSA (OBSTRUCTIVE SLEEP APNEA): Primary | ICD-10-CM

## 2018-12-14 DIAGNOSIS — E66.01 OBESITY, CLASS III, BMI 40-49.9 (MORBID OBESITY) (HCC): Primary | ICD-10-CM

## 2018-12-14 DIAGNOSIS — R63.5 ABNORMAL WEIGHT GAIN: ICD-10-CM

## 2018-12-14 RX ORDER — TOPIRAMATE 25 MG/1
25 CAPSULE, COATED PELLETS ORAL
Qty: 30 CAPSULE | Refills: 0 | Status: SHIPPED | OUTPATIENT
Start: 2018-12-14 | End: 2019-01-29

## 2018-12-14 NOTE — TELEPHONE ENCOUNTER
Spoke with pt and discussed EKG results, she has incomplete RBBB, no previous one available  She has no symptoms related to this  Explained sometimes untreated ADAM can cause changes like this, but if she develops any heart racing, palpitations encouraged to call her PCP for further work up  After discussion with pt about other options for medicines for weight loss including 1- increasing her metformin, starting Topiramate 25mg qhs or starting saxenda (but unsure of coverage) we have decided to start Topiramate as she does have stress eating/carb cravings and can benefit from Topiramate  She will check with her insurance about coverage of saxenda in case is needed in the future if she doesn't tolerate Topiramate  Reviewed the potential side effects of topiramate, which may include numbness or tingling, fatigue, upper respiratory infection symptoms, depression/anxiety, changes in taste, abdominal upset/heartburn, and trouble sleeping  Call us if any side effetcs or issues with new meds

## 2018-12-14 NOTE — TELEPHONE ENCOUNTER
Pt's insurance denied in lab cpap titration study  Do you want to do peer to peer or auto pap? If peer to peer [phone]  If auto pap can you order so I can schedule      Gayle

## 2018-12-14 NOTE — TELEPHONE ENCOUNTER
Pt called said she wanted to know if ekg results were reviewed if so she requested that Phentermine get called into pharmacy

## 2018-12-14 NOTE — TELEPHONE ENCOUNTER
Wilner Iverson called back personally the pt and discussed w her EKG results and plan to start different med Not phentermine  (see telephone note for details)    Thanks

## 2018-12-17 NOTE — TELEPHONE ENCOUNTER
Spoke with patient regarding insurance denial of CPAP study and APAP  She will keep follow up with Dr Geovany Hernandez on 12/27 as scheduled  Rx to [address]  for APAP set up at that appointment

## 2018-12-17 NOTE — PROGRESS NOTES
Weight Management Medical Nutrition Assessment  Rosita presented for 3/3 meal planning session  Today's weight is 258 6 #  She has lost 4 1#  in the past 2 weeks and overall she has lost 13 6# (5% TBW)  in the past  4 months  She stated she is experiencing carb cravings throughout her day - mainly sweets  She has found herself skipping snacks and meals - longer periods between  We developed a meal plan for 3 meals and will set timer for lunch meal  We discussed removing trigger foods from the home (bagels) and meal planning / prepping for 2 days at a time  She started Topiramate last night and stated she hopes this will help with carb cravings  We developed exercise goals as well  Anthropometric Measurements  Start Weight (lbs):272 2#  Current Weight (lbs): 258 6 #  TBW % Change from start weight:5%  Ideal Body Weight (lbs):120#  Goal Weight (lbs):ST#      LT#      Weight Loss History  Previous weight loss attempts: Commercial Programs (Weight Watchers, [place], etc )  Counseling with  MD  Phentermine     Food and Nutrition Related History  Food Recall  Meal Plan  Breakfast:bagel or bar or omelet  Snack:skip  Lunch: work- wrap or salad  Home- skip or delayed   Snack:  Dinner:omelet or lean cuisine - wings from [employer]   Snack:skip      Beverages: water  Volume of beverage intake: 60-80oz     Weekends:   Cravings: sweets  Trouble area of day:nighttime     Frequency of Eating out: irregularly  Food restrictions:none  Cooking: self   Food Shopping: self     Physical Activity Intake  Activity:none at the moment  Frequency:infrequently  Physical limitations/barriers to exercise: knee pain- needs replacement      Estimated Needs  Energy     [employer] Needs:  BMR : 1773 calories    1-2# loss weekly sedentary:1633-4790   calories              1-2# loss weekly lightly active:8162-2047 calories   Protein:67-84gm    (1 2-1 5g/kg IBW)  Fluid: 65oz     (35mL/kg IBW)     Nutrition Diagnosis  Yes;    Overweight/obesity  related to Excess energy intake as evidenced by  BMI more than normative standard for age and sex (obesity-grade III 36+)     Nutrition Intervention     Nutrition Prescription  Calories:1200 calories   Protein:70-90grams daily   Fluid:80oz daily      Meal Plan  Breakfast:200-300/20/30  Snack:  Lunch:400-500/30/30  Snack:  Dinner:400-500/30/30  Snack:  (200)    Nutrition Education:       Calorie controlled menu  Lean protein food choices  Healthy snack options  Food journaling tips        Nutrition Counseling:  Strategies: meal planning, portion sizes, healthy snack choices, hydration, fiber intake, protein intake, exercise, food journal        Monitoring and Evaluation:  Evaluation criteria:  Energy Intake  Meet protein needs  Maintain adequate hydration  Monitor weekly weight  Meal planning/preparation  Food journal   Decreased portions at mealtimes and snacks  Physical activity      Barriers to learning:none  Readiness to change: Action  Comprehension: good  Expected Compliance: good

## 2018-12-18 ENCOUNTER — OFFICE VISIT (OUTPATIENT)
Dept: BARIATRICS | Facility: CLINIC | Age: 53
End: 2018-12-18

## 2018-12-18 VITALS — BODY MASS INDEX: 44.15 KG/M2 | WEIGHT: 258.6 LBS | HEIGHT: 64 IN

## 2018-12-18 DIAGNOSIS — R63.5 ABNORMAL WEIGHT GAIN: Primary | ICD-10-CM

## 2018-12-18 PROCEDURE — RECHECK: Performed by: DIETITIAN, REGISTERED

## 2018-12-27 ENCOUNTER — OFFICE VISIT (OUTPATIENT)
Dept: SLEEP CENTER | Facility: CLINIC | Age: 53
End: 2018-12-27
Payer: COMMERCIAL

## 2018-12-27 VITALS
DIASTOLIC BLOOD PRESSURE: 86 MMHG | WEIGHT: 264 LBS | HEART RATE: 84 BPM | SYSTOLIC BLOOD PRESSURE: 142 MMHG | BODY MASS INDEX: 45.07 KG/M2 | HEIGHT: 64 IN

## 2018-12-27 DIAGNOSIS — F31.9 BIPOLAR AFFECTIVE DISORDER, REMISSION STATUS UNSPECIFIED (HCC): ICD-10-CM

## 2018-12-27 DIAGNOSIS — I10 ESSENTIAL HYPERTENSION: ICD-10-CM

## 2018-12-27 DIAGNOSIS — G47.33 OSA (OBSTRUCTIVE SLEEP APNEA): Primary | ICD-10-CM

## 2018-12-27 DIAGNOSIS — E66.01 OBESITY, CLASS III, BMI 40-49.9 (MORBID OBESITY) (HCC): ICD-10-CM

## 2018-12-27 PROCEDURE — 99214 OFFICE O/P EST MOD 30 MIN: CPT | Performed by: PSYCHIATRY & NEUROLOGY

## 2018-12-27 NOTE — PROGRESS NOTES
Review of Systems      Genitourinary need to urinate more than twice a night   Cardiology ankle/leg swelling   Gastrointestinal none   Neurology forgetfulness, poor concentration or confusion,  and difficulty with memory   Constitutional fatigue   Integumentary none   Psychiatry none   Musculoskeletal joint pain   Pulmonary shortness of breath with activity, chest tightness, wheezing, frequent cough and snoring   ENT none   Endocrine none   Hematological none

## 2018-12-27 NOTE — PROGRESS NOTES
Progress Note - [address] [facility] 48 y o  female   :1965, MRN: [id number]  2018          Follow Up Evaluation / Problem:     Obstructive Sleep Apnea  Obesity    HPI: Ivis Sanabria is a 48y o  year old female  She is currently under evaluation for obstructive sleep apnea  All night polysomnography was completed on 2018  She returns now for review of her test results          Current Outpatient Prescriptions:     albuterol (PROVENTIL HFA,VENTOLIN HFA) 90 mcg/act inhaler, Inhale as needed  , Disp: , Rfl:     ALPRAZolam (XANAX) 0 25 mg tablet, alprazolam 0 25 mg tablet, Disp: , Rfl:     amLODIPine (NORVASC) 10 mg tablet, Take 10 mg by mouth daily  , Disp: , Rfl:     Azelastine HCl 137 MCG/SPRAY SOLN, 1 spray by Each Nare route 2 (two) times a day, Disp: , Rfl: 0    bisoprolol (ZEBETA) 10 MG tablet, daily  , Disp: , Rfl:     diclofenac (VOLTAREN) 75 mg EC tablet, daily  , Disp: , Rfl:     doxycycline hyclate (VIBRAMYCIN) 100 mg capsule, take 1 capsule by mouth every 12 hours for 7 days, Disp: , Rfl: 0    EDARBYCLOR 40-25 MG TABS, daily  , Disp: , Rfl:     glucose blood test strip, Check blood sugar QID, Disp: , Rfl:     lamoTRIgine (LaMICtal) 200 MG tablet, daily  , Disp: , Rfl:     Loratadine (CLARITIN) 10 MG CAPS, Take by mouth as needed  , Disp: , Rfl:     meloxicam (MOBIC) 15 mg tablet, , Disp: , Rfl: 0    metFORMIN (GLUCOPHAGE) 500 mg tablet, Take 500 mg by mouth 2 (two) times a day with meals  , Disp: , Rfl:     methylPREDNISolone (Solu-MEDROL) IV syringe in D5W (PED), Infuse into a venous catheter daily For bilateral knee/every 3 months, Disp: , Rfl:     Sodium Hyaluronate (EUFLEXXA) 20 MG/2ML SOSY, knee injection, Disp: , Rfl:     topiramate (TOPAMAX) 25 mg sprinkle capsule, Take 1 capsule (25 mg total) by mouth daily at bedtime, Disp: 30 capsule, Rfl: 0    venlafaxine (EFFEXOR-XR) 150 mg 24 hr capsule, , Disp: , Rfl:     venlafaxine (EFFEXOR-XR) 75 mg 24 hr capsule, , Disp: , Rfl:     [place] Sleepiness Scale  Sitting and reading: Would never doze  Watching TV: Would never doze  Sitting, inactive in a public place (e g  a theatre or a meeting): Moderate chance of dozing  As a passenger in a car for an hour without a break: Slight chance of dozing  Lying down to rest in the afternoon when circumstances permit: High chance of dozing  Sitting and talking to someone: Would never doze  Sitting quietly after a lunch without alcohol: Moderate chance of dozing  In a car, while stopped for a few minutes in traffic: Slight chance of dozing  Total score: 9              Vitals:    12/27/18 0800   BP: 142/86   Pulse: 84   Weight: 120 kg (264 lb)   Height: 5' 4" (1 626 m)       Body mass index is 45 32 kg/m²  Neck Circumference: 38       EPWORTH SLEEPINESS SCORE  Total score: 9      Past History Since Last Sleep Center Visit:     Since her last visit she has undergone all night polysomnography  This test was very abnormal showing evidence of severe obstructive sleep apnea  There were a total of 141 respiratory events made up of 24 obstructive apneas, 0 mixed apneas, 0 central apneas, and 117 hypopneas resulting in an apnea/hypopnea index (AHI) of 56 8 events per hour of sleep  Her insurance company denied coverage for an inpatient titration study  She returns today for fitting of an AutoPAP device for treatment of severe obstructive sleep apnea  She has recently enrolled in a weight loss treatment center and has started taking topiramate to assist in her weight loss  She was going to use phentermine but was found to have a partial right bundle branch block making treatment with this medications problematic        The review of systems and following portions of the patient's history were reviewed and updated as appropriate: allergies, current medications, past family history, past medical history, past social history, past surgical history, and problem list     Review of Systems        Genitourinary need to urinate more than twice a night   Cardiology ankle/leg swelling   Gastrointestinal none   Neurology forgetfulness, poor concentration or confusion,  and difficulty with memory   Constitutional fatigue   Integumentary none   Psychiatry none   Musculoskeletal joint pain   Pulmonary shortness of breath with activity, chest tightness, wheezing, frequent cough and snoring   ENT none   Endocrine none   Hematological none         OBJECTIVE    PAP Pressure: Nasal AutoPAP using a lower limit of 5 cm and an upper limit of 20 cm of water pressure  DME Provider: [employer]    Physical Exam:     General Appearance:   Alert, cooperative, no distress, appears stated age, morbidly obese     Head:   Normocephalic, without obvious abnormality, atraumatic     Eyes:   PERRL, conjunctiva/corneas clear, EOM's intact          Nose:  Nares normal, septum midline, no drainage or sinus tenderness     Neck:  Supple, symmetrical, trachea midline, no adenopathy; Thyroid: No enlargement, tenderness or nodules; no carotid bruit or JVD     Lungs:      Clear to auscultation bilaterally, respirations unlabored     Heart:   Regular rate and rhythm, S1 and S2 normal, no murmur, rub or gallop       Extremities:  Extremities normal, atraumatic, no cyanosis or edema     Pulses:  2+ and symmetric all extremities     Skin:  Skin color, texture, turgor normal, no rashes or lesions       Neurologic:  CNII-XII intact  Normal strength, sensation throughout       ASSESSMENT / PLAN    1  ADAM (obstructive sleep apnea)     2  Obesity, Class III, BMI 40-49 9 (morbid obesity) ([facility] [address] )     3  Essential hypertension     4  Bipolar affective disorder, remission status unspecified ([facility] )             Counseling / Coordination of Care  Total clinic time spent today 25 minutes  Greater than 50% of total time was spent with the patient and / or family counseling and / or coordination of care       A description of the counseling / coordination of care:     Impressions, Diagnostic results, Prognosis, Instructions for management, Risks and benefits of treatment, Patient and family education, Risk factor reductions and Importance of compliance with treatment    The following instructions have been given to the patient today:    Patient Instructions   1  Begin using AutoPAP set to deliver a minimum pressure of 5 cm and maximum pressure of 20 cm  2  Fit for the most comfortable interface device possible  3  Return to the [address] in 6 weeks for a review of compliance data  4  If not satisfied with machine settings or interface device contact you distributor  5  Follow-up review of compliance data a date for further follow-up will be determined  6    Contact the [facility] if any new medical problems arise prior to follow-up        Vern Pedroza

## 2018-12-27 NOTE — PATIENT INSTRUCTIONS
1  Begin using AutoPAP set to deliver a minimum pressure of 5 cm and maximum pressure of 20 cm  2  Fit for the most comfortable interface device possible  3  Return to the [address] in 6 weeks for a review of compliance data  4  If not satisfied with machine settings or interface device contact you distributor  5  Follow-up review of compliance data a date for further follow-up will be determined  6    Contact the [facility] if any new medical problems arise prior to follow-up

## 2019-01-08 ENCOUNTER — TELEPHONE (OUTPATIENT)
Dept: SLEEP CENTER | Facility: CLINIC | Age: 54
End: 2019-01-08

## 2019-01-08 ENCOUNTER — OFFICE VISIT (OUTPATIENT)
Dept: BARIATRICS | Facility: CLINIC | Age: 54
End: 2019-01-08

## 2019-01-08 VITALS — BODY MASS INDEX: 45.55 KG/M2 | WEIGHT: 266.8 LBS | HEIGHT: 64 IN

## 2019-01-08 DIAGNOSIS — R63.5 ABNORMAL WEIGHT GAIN: ICD-10-CM

## 2019-01-08 PROCEDURE — WMDI30: Performed by: DIETITIAN, REGISTERED

## 2019-01-08 PROCEDURE — RECHECK: Performed by: DIETITIAN, REGISTERED

## 2019-01-08 NOTE — PROGRESS NOTES
Weight Management Medical Nutrition Assessment  Rosita presented for a meal planning session  Today's weight is 266 8#  She has had a 8 2# weight gain in the past 3 weeks and overall she has lost 5 4# (2% TBW)  in the past 4 months  She stated she got really off track with her meal plan related to increased hunger taking prednisone for bronchitis over the holidays  Prior to that event she state the 3 meal plan we developed was helpful  We discussed removing trigger foods from the home  Patient made short term goals of weighing herself daily / exercise goals/ and food logging        Anthropometric Measurements  Start Weight (lbs):272 2#  Current Weight (lbs): 266 8#  TBW % Change from start weight:2%  Ideal Body Weight (lbs):120#  Goal Weight (lbs):ST#      LT#      Weight Loss History  Previous weight loss attempts: Commercial Programs (Weight Watchers, [employer] [facility], etc )  Counseling with  MD  Phentermine     Food and Nutrition Related History     Beverages: water  Volume of beverage intake: 60-80oz     Weekends:   Cravings: sweets  Trouble area of day:nighttime     Frequency of Eating out: irregularly  Food restrictions:none  Cooking: self   Food Shopping: self     Physical Activity Intake  Activity:none at the moment  Frequency:infrequently  Physical limitations/barriers to exercise: knee pain- needs replacement      Estimated Needs  Energy     [employer] Needs:  BMR : 1810 calories    1-2# loss weekly sedentary:9447-3250   calories              7-6# loss weekly lightly active:1489-1909calories   Protein:67-84gm    (1 2-1 5g/kg IBW)  Fluid: 65oz     (35mL/kg IBW)     Nutrition Diagnosis  Yes;    Overweight/obesity  related to Excess energy intake as evidenced by  BMI more than normative standard for age and sex (obesity-grade III 36+)     Nutrition Intervention     Nutrition Prescription  Calories:1200 calories on sedentary days and flex to 2705-7147 on eliptical days  [id number] daily   Fluid:80oz daily      Meal Plan  Breakfast:200-300/20/30  Snack:  Lunch:400-500/30/30  Snack:  Dinner:400-500/30/30  Snack:  (200)     Nutrition Education:       Calorie controlled menu  Lean protein food choices  Healthy snack options  Food journaling tips        Nutrition Counseling:  Strategies: meal planning, portion sizes, healthy snack choices, hydration, fiber intake, protein intake, exercise, food journal        Monitoring and Evaluation:  Evaluation criteria:  Energy Intake  Meet protein needs  Maintain adequate hydration  Monitor weekly weight  Meal planning/preparation  Food journal   Decreased portions at mealtimes and snacks  Physical activity      Barriers to learning:none  Readiness to change: Action  Comprehension: good  Expected Compliance: good

## 2019-01-29 DIAGNOSIS — E66.01 OBESITY, CLASS III, BMI 40-49.9 (MORBID OBESITY) (HCC): Primary | ICD-10-CM

## 2019-01-29 DIAGNOSIS — E66.01 OBESITY, CLASS III, BMI 40-49.9 (MORBID OBESITY) (HCC): ICD-10-CM

## 2019-01-29 DIAGNOSIS — R63.5 ABNORMAL WEIGHT GAIN: ICD-10-CM

## 2019-01-29 RX ORDER — TOPIRAMATE 50 MG/1
50 TABLET, FILM COATED ORAL
Qty: 30 TABLET | Refills: 1 | Status: SHIPPED | OUTPATIENT
Start: 2019-01-29 | End: 2019-02-18 | Stop reason: SDUPTHER

## 2019-01-29 RX ORDER — TOPIRAMATE 25 MG/1
25 CAPSULE, COATED PELLETS ORAL
Qty: 30 CAPSULE | Refills: 0 | Status: CANCELLED | OUTPATIENT
Start: 2019-01-29

## 2019-01-29 NOTE — TELEPHONE ENCOUNTER
Pt requesting refill of Topiramate, she is down to 3 pills  She also feels that 25mg is not helping so she is asking for an increase in dosage  Thanks

## 2019-02-06 ENCOUNTER — OFFICE VISIT (OUTPATIENT)
Dept: BARIATRICS | Facility: CLINIC | Age: 54
End: 2019-02-06

## 2019-02-06 VITALS — HEIGHT: 64 IN | WEIGHT: 264.9 LBS | BODY MASS INDEX: 45.23 KG/M2

## 2019-02-06 DIAGNOSIS — R63.5 ABNORMAL WEIGHT GAIN: ICD-10-CM

## 2019-02-06 PROCEDURE — RECHECK: Performed by: DIETITIAN, REGISTERED

## 2019-02-06 PROCEDURE — DB3PK: Performed by: DIETITIAN, REGISTERED

## 2019-02-18 ENCOUNTER — TELEPHONE (OUTPATIENT)
Dept: SLEEP CENTER | Facility: CLINIC | Age: 54
End: 2019-02-18

## 2019-02-18 ENCOUNTER — OFFICE VISIT (OUTPATIENT)
Dept: BARIATRICS | Facility: CLINIC | Age: 54
End: 2019-02-18
Payer: COMMERCIAL

## 2019-02-18 VITALS
WEIGHT: 267.8 LBS | BODY MASS INDEX: 44.62 KG/M2 | HEART RATE: 65 BPM | RESPIRATION RATE: 18 BRPM | SYSTOLIC BLOOD PRESSURE: 120 MMHG | HEIGHT: 65 IN | TEMPERATURE: 98.1 F | DIASTOLIC BLOOD PRESSURE: 72 MMHG

## 2019-02-18 DIAGNOSIS — F31.9 BIPOLAR AFFECTIVE DISORDER, REMISSION STATUS UNSPECIFIED (HCC): ICD-10-CM

## 2019-02-18 DIAGNOSIS — E78.2 MIXED HYPERLIPIDEMIA: ICD-10-CM

## 2019-02-18 DIAGNOSIS — I10 ESSENTIAL HYPERTENSION: ICD-10-CM

## 2019-02-18 DIAGNOSIS — E11.9 DIABETES MELLITUS, NEW ONSET (HCC): ICD-10-CM

## 2019-02-18 DIAGNOSIS — E66.01 OBESITY, CLASS III, BMI 40-49.9 (MORBID OBESITY) (HCC): Primary | ICD-10-CM

## 2019-02-18 DIAGNOSIS — G47.33 OSA (OBSTRUCTIVE SLEEP APNEA): ICD-10-CM

## 2019-02-18 PROCEDURE — 99214 OFFICE O/P EST MOD 30 MIN: CPT | Performed by: FAMILY MEDICINE

## 2019-02-18 RX ORDER — TOPIRAMATE 100 MG/1
100 TABLET, FILM COATED ORAL
Qty: 30 TABLET | Refills: 1 | Status: SHIPPED | OUTPATIENT
Start: 2019-02-18 | End: 2019-03-15 | Stop reason: SDUPTHER

## 2019-02-18 NOTE — TELEPHONE ENCOUNTER
Patient reports feeling suffocated when her Apap starts at 5  Reports feels better on the nights it starts at 6 or 7  Wants to have pressure increased to start at 6 or 7   Will you place pressure change order?

## 2019-02-18 NOTE — PATIENT INSTRUCTIONS
Calories:1200 calories on sedentary days and flex to 9272-3335 on eliptical days  Protein:70-90grams daily   Fluid:80oz daily
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
## 2019-02-19 ENCOUNTER — TELEPHONE (OUTPATIENT)
Dept: SLEEP CENTER | Facility: CLINIC | Age: 54
End: 2019-02-19

## 2019-02-19 DIAGNOSIS — G47.33 OSA (OBSTRUCTIVE SLEEP APNEA): Primary | ICD-10-CM

## 2019-03-15 ENCOUNTER — OFFICE VISIT (OUTPATIENT)
Dept: BARIATRICS | Facility: CLINIC | Age: 54
End: 2019-03-15

## 2019-03-15 VITALS — WEIGHT: 260.8 LBS | HEIGHT: 64 IN | BODY MASS INDEX: 44.53 KG/M2

## 2019-03-15 DIAGNOSIS — E66.01 OBESITY, CLASS III, BMI 40-49.9 (MORBID OBESITY) (HCC): ICD-10-CM

## 2019-03-15 DIAGNOSIS — R63.5 ABNORMAL WEIGHT GAIN: Primary | ICD-10-CM

## 2019-03-15 PROCEDURE — RECHECK: Performed by: DIETITIAN, REGISTERED

## 2019-03-15 NOTE — PROGRESS NOTES
Weight Management Medical Nutrition Assessment  Rosita presented for 2/3 RD meal planning session  Today's weight is 260 8##  She has lost 7# # weight loss in the past month and overall she has lost 11 4# (4 1% TBW)  in the past 6 months  Increased topomax and time restricted eating from 12pm-8pm  Food journaling and averages 1600 with 2 meals and halo top ice cream  Did reveal last night she puchased [address] eggs for a total of 900 calories  Advised patient to try to have a balanced meals that meet her protein and fiber needs during her 12-8pm restriction      Anthropometric Measurements  Start Weight (lbs):272 2# ( )  Current Weight (lbs): 260 8#  TBW % Change from start weight:  4 1%  Ideal Body Weight (lbs):120#  Goal Weight (lbs):ST#      LT#      Weight Loss History  Previous weight loss attempts: Commercial Programs (Weight Watchers, [employer], etc )  Counseling with  MD  Phentermine     Food and Nutrition Related History     Beverages: water  Volume of beverage intake: 60-80oz     Weekends: same  Cravings: sweets  Trouble area of day:nighttime     Frequency of Eating out: irregularly  Food restrictions:none  Cooking: self   Food Shopping: self     Physical Activity Intake  Activity:none at the moment  Frequency:infrequently  Physical limitations/barriers to exercise: knee pain- needs replacement      Estimated Needs  Energy     [employer] Needs:  BMR : 1783 calories    1-2# loss weekly sedentary:2694-5692  calories                Protein:67-84gm    (1 2-1 5g/kg IBW)  Fluid: 65oz     (35mL/kg IBW)     Nutrition Diagnosis  Yes;    Overweight/obesity  related to Excess energy intake as evidenced by  BMI more than normative standard for age and sex (obesity-grade III 36+)     Nutrition Intervention     Nutrition Prescription  Calories:1600 calories  Protein:70-90grams daily   Fluid:80oz daily         Nutrition Education:       Calorie controlled menu  Lean protein food choices  Healthy snack options  Food journaling tips        Nutrition Counseling:  Strategies: meal planning, portion sizes, healthy snack choices, hydration, fiber intake, protein intake, exercise, food journal        Monitoring and Evaluation:  Evaluation criteria:  Energy Intake  Meet protein needs  Maintain adequate hydration  Monitor weekly weight  Meal planning/preparation  Food journal   Decreased portions at mealtimes and snacks  Physical activity      Barriers to learning:none  Readiness to change: Action  Comprehension: good  Expected Compliance: good

## 2019-03-15 NOTE — TELEPHONE ENCOUNTER
Patient requesting refill Topamax 100mg   She needs 3 month supply [facility] to [facility]

## 2019-03-18 ENCOUNTER — OFFICE VISIT (OUTPATIENT)
Dept: SLEEP CENTER | Facility: CLINIC | Age: 54
End: 2019-03-18
Payer: COMMERCIAL

## 2019-03-18 VITALS
HEIGHT: 64 IN | BODY MASS INDEX: 44.18 KG/M2 | WEIGHT: 258.8 LBS | DIASTOLIC BLOOD PRESSURE: 80 MMHG | HEART RATE: 67 BPM | SYSTOLIC BLOOD PRESSURE: 122 MMHG

## 2019-03-18 DIAGNOSIS — E66.01 OBESITY, CLASS III, BMI 40-49.9 (MORBID OBESITY) (HCC): ICD-10-CM

## 2019-03-18 DIAGNOSIS — Z99.89 OBSTRUCTIVE SLEEP APNEA TREATED WITH CONTINUOUS POSITIVE AIRWAY PRESSURE (CPAP): Primary | ICD-10-CM

## 2019-03-18 DIAGNOSIS — G47.33 OBSTRUCTIVE SLEEP APNEA TREATED WITH CONTINUOUS POSITIVE AIRWAY PRESSURE (CPAP): Primary | ICD-10-CM

## 2019-03-18 PROCEDURE — 99214 OFFICE O/P EST MOD 30 MIN: CPT | Performed by: NURSE PRACTITIONER

## 2019-03-18 RX ORDER — TOPIRAMATE 100 MG/1
100 TABLET, FILM COATED ORAL
Qty: 90 TABLET | Refills: 1 | Status: SHIPPED | OUTPATIENT
Start: 2019-03-18 | End: 2019-06-12 | Stop reason: SINTOL

## 2019-03-18 NOTE — PROGRESS NOTES
Progress Note - [address] [facility] 48 y o  female   :1965, MRN: [id number]  3/18/2019          Follow Up Evaluation / Problem:     Obstructive Sleep Apnea  Obesity    HPI: Kan Preston is a 48y o  year old female  She presents for follow up of obstructive sleep apnea  She was diagnosed with ADAM after completing a diagnostic polysomnogram in November  She began the use of APAP set between 5cm and 15cm of water pressure  She is here to review compliance and effectiveness of treatment        Review of Systems      Genitourinary need to urinate more than twice a night   Cardiology none   Gastrointestinal none   Neurology forgetfulness, poor concentration or confusion,  and difficulty with memory   Constitutional weight change   Integumentary none   Psychiatry anxiety and depression   Musculoskeletal none   Pulmonary none   ENT none   Endocrine none   Hematological none       Current Outpatient Medications:     albuterol (PROVENTIL HFA,VENTOLIN HFA) 90 mcg/act inhaler, Inhale as needed  , Disp: , Rfl:     ALPRAZolam (XANAX) 0 25 mg tablet, alprazolam 0 25 mg tablet as needed, Disp: , Rfl:     amLODIPine (NORVASC) 10 mg tablet, Take 10 mg by mouth daily  , Disp: , Rfl:     bisoprolol (ZEBETA) 10 MG tablet, daily  , Disp: , Rfl:     diclofenac (VOLTAREN) 75 mg EC tablet, daily  , Disp: , Rfl:     EDARBYCLOR 40-25 MG TABS, daily  , Disp: , Rfl:     glucose blood test strip, Check blood sugar QID, Disp: , Rfl:     lamoTRIgine (LaMICtal) 200 MG tablet, daily  , Disp: , Rfl:     metFORMIN (GLUCOPHAGE) 500 mg tablet, Take 500 mg by mouth 2 (two) times a day with meals  , Disp: , Rfl:     methylPREDNISolone (Solu-MEDROL) IV syringe in D5W (PED), Infuse into a venous catheter daily For bilateral knee/every 3 months, Disp: , Rfl:     Sodium Hyaluronate (EUFLEXXA) 20 MG/2ML SOSY, knee injection every 6 months, Disp: , Rfl:     topiramate (TOPAMAX) 100 mg tablet, Take 1 tablet (100 mg total) by mouth daily at bedtime In 2 weeks take 1/2 tab in the Am and 1 tab in the PM, Disp: 90 tablet, Rfl: 1    venlafaxine (EFFEXOR-XR) 150 mg 24 hr capsule, , Disp: , Rfl:     venlafaxine (EFFEXOR-XR) 75 mg 24 hr capsule, , Disp: , Rfl:     Azelastine HCl 137 MCG/SPRAY SOLN, 1 spray by Each Nare route 2 (two) times a day, Disp: , Rfl: 0    Loratadine (CLARITIN) 10 MG CAPS, Take by mouth as needed  , Disp: , Rfl:     meloxicam (MOBIC) 15 mg tablet, , Disp: , Rfl: 0    [place] Sleepiness Scale  Sitting and reading: Would never doze  Watching TV: Slight chance of dozing  Sitting, inactive in a public place (e g  a theatre or a meeting): Slight chance of dozing  As a passenger in a car for an hour without a break: Slight chance of dozing  Lying down to rest in the afternoon when circumstances permit: High chance of dozing  Sitting and talking to someone: Would never doze  Sitting quietly after a lunch without alcohol: Moderate chance of dozing  In a car, while stopped for a few minutes in traffic: Would never doze  Total score: 8              Vitals:    03/18/19 1500   BP: 122/80   Pulse: 67   Weight: 117 kg (258 lb 12 8 oz)   Height: 5' 4" (1 626 m)       Body mass index is 44 42 kg/m²  EPWORTH SLEEPINESS SCORE  Total score: 8      Past History Since Last Sleep Center Visit:   She states that she feels like a new person since using APAP  She is no longer waking up frequently throughout the night  She feels more refreshed and has noticed improvement with concentration and memory  She is sleeping soundly for 8-10 hours a night  She continues to work with the bariatric specialists and has continued to lose weight slowly  When she began the use of APAP, she felt as though she wasn't getting enough air when the equipment began at 5cm  Lower pressure limit was increased to 7cm  She finds that lately she removes her APAP during sleep and even turns the machine off    She does notice that the she awakens to the pressure feeling very strong at times  Unfortunately, her puppy chewed her head gear/mask approximately one week ago, so she was unable to use her APAP until she received new supplies  The review of systems and following portions of the patient's history were reviewed and updated as appropriate: allergies, current medications, past family history, past medical history, past social history, past surgical history, and problem list         OBJECTIVE    PAP Pressure: Nasal AutoPAP using a lower limit of 7 cm and an upper limit of 15 cm of water pressure  DME Provider: [employer]    Physical Exam:     General Appearance:   Alert, cooperative, no distress, appears stated age, morbidly obese     Head:   Normocephalic, without obvious abnormality, atraumatic     Eyes:   PERRL, conjunctiva/corneas clear, EOM's intact          Nose:  Nares normal, septum midline, no drainage or sinus tenderness           Throat:  Lips, teeth and gums normal; tongue normal size and  shape and midline mucosa moist with low-lying soft palatal tissue, uvula barely visible, tonsils not visualized, Mallampati class 4       Neck:  Supple, symmetrical, trachea midline, no adenopathy; Thyroid: No enlargement, tenderness or nodules; no carotid bruit or JVD     Lungs:      Clear to auscultation bilaterally, respirations unlabored     Heart:   Regular rate and rhythm, S1 and S2 normal, no murmur, rub or gallop       Extremities:  Extremities normal, atraumatic, no cyanosis and trace edema in LE bilaterally       Skin:  Skin color, texture, turgor normal, no rashes or lesions       Neurologic:  No focal deficits noted  Normal strength, sensation throughout       ASSESSMENT / PLAN    1  Obstructive sleep apnea treated with continuous positive airway pressure (CPAP)  PAP DME Pressure Change    PAP DME Resupply/Reorder   2   Obesity, Class III, BMI 40-49 9 (morbid obesity) ([facility] )             Counseling / Coordination of Care  Total clinic time spent today 30 minutes  Greater than 50% of total time was spent with the patient and / or family counseling and / or coordination of care  A description of the counseling / coordination of care:     Impressions, Diagnostic results, Prognosis, Instructions for management, Risks and benefits of treatment, Patient and family education, Risk factor reductions and Importance of compliance with treatment    Today I reviewed the patient's compliance data  she has been able to use the equipment 83 3% of the past 30 days recorded  Average usage was 4 or more hours 50% of the past 30 days recorded  The estimated AHI is 3 8 abnormal breathing events per hour  Response to treatment has been very good  She is using the Dream  adal to view her compliance and AHI  Some of compliance decrease is due to her mask being destroyed by her dog  We also discussed her removing the mask in her sleep  A pressure change has been made to decrease the upper pressure limit to 12cm, which may not cause her to arouse from sleep and remove her equipment  We will review the AHI at her 6 month follow up and make adjustments as she becomes desensitized  She will monitor the adal to see if her AHI increases significantly over 5 on a consistent basis  Pressure change may be considered if this occurs  Supplies have been ordered for the next 12 months  She will continue to work on efforts at weight loss, which will improve her sleep apnea and hopefully allow pressure requirements to decrease  She will continue using this equipment at the settings noted above for the next 6 months  At that timeshe will then return for a routine follow-up evaluation  I have asked her to contact the Sleep [facility] if she encounters any difficulties prior to that time  The following instructions have been given to the patient today:    Patient Instructions   1    Continue use of CPAP equipment nightly  2  Continue to clean your equipment, as discussed  3  Contact the Sleep [address] [place] [address] with any questions or concerns prior to your next visit, as needed  4  Schedule visit for follow-up in 6 months  5    Since you are using the Dream  adal, call if you are seeing AHI consistently greater than 5, call for pressure change      Ramírez Leon, [facility]

## 2019-03-27 NOTE — PROGRESS NOTES
Assessment/Plan:    No problem-specific Assessment & Plan notes found for this encounter  Diagnoses and all orders for this visit:    Obesity, Class III, BMI 40-49 9 (morbid obesity) ([facility])    Bipolar affective disorder, remission status unspecified ([facility] )    Obstructive sleep apnea treated with continuous positive airway pressure (CPAP)    Diabetes mellitus, new onset ([facility] )        -Patient is pursuing Conservative Program  -Initial weight loss goal of 5-10% weight loss for improved health  -Screening labs  - continue with current dose of topamax    - continue with current Time restricted fasting 16:8, still follow recommended daily angely goals and macros    -Plan for adding a walk to your exercise routine as tolerated now that her knees are feelign better  Initial:270lbs  Current: 257 3lbs  Change: -12 7lbs  Goal: 230lbs    Goals:  Food log (ie ) www myfitnesspal com,sparkpeople  com,loseit com,calorieking  com,etc  baritastic  No sugary beverages  At least 64oz of water daily  Increase physical activity by 10 minutes daily  Gradually increase physical activity to a goal of 5 days per week for 30 minutes of MODERATE intensity PLUS 2 days per week of FULL BODY resistance training    Follow up as scheduled with RD for bundles  Follow up in approximately 3 months with Non-Surgical Physician/Advanced Practitioner  Subjective:   Chief Complaint   Patient presents with    Follow-up     4-6 week mwm follow up        Patient ID: Quin Vail  is a 48 y o  female with excess weight/obesity here to pursue weight management  Patient is pursuing RD bundle  HPI   Pt presents for medical weight loss follow up, she is currently seeing [facility] for RD bundle and meal planning  She has lost -2 7lbs since last visit with RD 2 weeks ago  Total of -12 7lbs since initial program      Last visit we added topamax  Pt has tolerated it well  She has lost -10lbs since adding the topamax on 2/18/19     She feels like is helping her binge eating and cravings  She mentions how she feels now like a "normal" person and how it has helped her with mindful decisions  She is food logging about 1500 calories a day  She also started time restricted fasting and finds it sustainable for her lifestyle and now is able to log better  Not exercising  But feels like her knees are feeling better so eager to start more physical activity  She has apt with Behavioral therapist next month for eval and consult for CBT for eating disorder  The following portions of the patient's history were reviewed and updated as appropriate: allergies, current medications, past family history, past medical history, past social history, past surgical history and problem list     Review of Systems    Objective:    /60 (BP Location: Left arm, Patient Position: Sitting, Cuff Size: Adult)   Pulse 64   Temp 97 8 °F (36 6 °C) (Tympanic)   Resp 16   Ht 5' 4 5" (1 638 m)   Wt 117 kg (257 lb 4 8 oz)   BMI 43 48 kg/m²      Physical Exam     Constitutional   General appearance: Abnormal   well developed and obese  Eyes No conjunctival pallor  Ears, Nose, Mouth, and Throat Oral mucosa moist    Pulmonary   Respiratory effort: No increased work of breathing or signs of respiratory distress  Auscultation of lungs: Clear to auscultation, equal breath sounds bilaterally, no wheezes, no rales, no rhonci  Cardiovascular   Auscultation of heart: Normal rate and rhythm, normal S1 and S2, without murmurs  Examination of extremities for edema and/or varicosities: Normal   no edema  Abdomen   Abdomen: Abnormal   The abdomen was obese  Bowel sounds were normal  The abdomen was soft and nontender     Musculoskeletal   Gait and station: Normal     Psychiatric   Orientation to person, place and time: Normal     Affect: appropriate

## 2019-03-28 ENCOUNTER — OFFICE VISIT (OUTPATIENT)
Dept: BARIATRICS | Facility: CLINIC | Age: 54
End: 2019-03-28
Payer: COMMERCIAL

## 2019-03-28 VITALS
WEIGHT: 257.3 LBS | HEART RATE: 64 BPM | RESPIRATION RATE: 16 BRPM | HEIGHT: 65 IN | DIASTOLIC BLOOD PRESSURE: 60 MMHG | SYSTOLIC BLOOD PRESSURE: 120 MMHG | TEMPERATURE: 97.8 F | BODY MASS INDEX: 42.87 KG/M2

## 2019-03-28 DIAGNOSIS — E66.01 OBESITY, CLASS III, BMI 40-49.9 (MORBID OBESITY) (HCC): Primary | ICD-10-CM

## 2019-03-28 DIAGNOSIS — F31.9 BIPOLAR AFFECTIVE DISORDER, REMISSION STATUS UNSPECIFIED (HCC): ICD-10-CM

## 2019-03-28 DIAGNOSIS — E11.9 DIABETES MELLITUS, NEW ONSET (HCC): ICD-10-CM

## 2019-03-28 DIAGNOSIS — G47.33 OBSTRUCTIVE SLEEP APNEA TREATED WITH CONTINUOUS POSITIVE AIRWAY PRESSURE (CPAP): ICD-10-CM

## 2019-03-28 DIAGNOSIS — Z99.89 OBSTRUCTIVE SLEEP APNEA TREATED WITH CONTINUOUS POSITIVE AIRWAY PRESSURE (CPAP): ICD-10-CM

## 2019-03-28 PROCEDURE — 99214 OFFICE O/P EST MOD 30 MIN: CPT | Performed by: FAMILY MEDICINE

## 2019-04-17 ENCOUNTER — OFFICE VISIT (OUTPATIENT)
Dept: BARIATRICS | Facility: CLINIC | Age: 54
End: 2019-04-17

## 2019-04-17 VITALS — WEIGHT: 262.3 LBS | HEIGHT: 64 IN | BODY MASS INDEX: 44.78 KG/M2

## 2019-04-17 DIAGNOSIS — R63.5 ABNORMAL WEIGHT GAIN: Primary | ICD-10-CM

## 2019-04-17 PROCEDURE — RECHECK: Performed by: DIETITIAN, REGISTERED

## 2019-05-06 ENCOUNTER — APPOINTMENT (OUTPATIENT)
Dept: LAB | Facility: HOSPITAL | Age: 54
End: 2019-05-06
Payer: COMMERCIAL

## 2019-05-06 ENCOUNTER — TRANSCRIBE ORDERS (OUTPATIENT)
Dept: ADMINISTRATIVE | Facility: HOSPITAL | Age: 54
End: 2019-05-06

## 2019-05-06 DIAGNOSIS — Z79.4 TYPE 2 DIABETES MELLITUS WITHOUT COMPLICATION, WITH LONG-TERM CURRENT USE OF INSULIN (HCC): ICD-10-CM

## 2019-05-06 DIAGNOSIS — E78.2 MIXED HYPERLIPIDEMIA: Primary | ICD-10-CM

## 2019-05-06 DIAGNOSIS — E11.9 TYPE 2 DIABETES MELLITUS WITHOUT COMPLICATION, WITH LONG-TERM CURRENT USE OF INSULIN (HCC): ICD-10-CM

## 2019-05-06 DIAGNOSIS — E78.2 MIXED HYPERLIPIDEMIA: ICD-10-CM

## 2019-05-06 LAB
ALBUMIN SERPL BCP-MCNC: 3.9 G/DL (ref 3.5–5.7)
ALP SERPL-CCNC: 49 U/L (ref 40–150)
ALT SERPL W P-5'-P-CCNC: 21 U/L (ref 7–52)
ANION GAP SERPL CALCULATED.3IONS-SCNC: 11 MMOL/L (ref 4–13)
AST SERPL W P-5'-P-CCNC: 15 U/L (ref 13–39)
BILIRUB SERPL-MCNC: 0.4 MG/DL (ref 0.2–1)
BUN SERPL-MCNC: 19 MG/DL (ref 7–25)
CALCIUM SERPL-MCNC: 9.3 MG/DL (ref 8.6–10.5)
CHLORIDE SERPL-SCNC: 102 MMOL/L (ref 98–107)
CHOLEST SERPL-MCNC: 252 MG/DL (ref 0–200)
CO2 SERPL-SCNC: 27 MMOL/L (ref 21–31)
CREAT SERPL-MCNC: 0.77 MG/DL (ref 0.6–1.2)
CREAT UR-MCNC: 79.9 MG/DL
EST. AVERAGE GLUCOSE BLD GHB EST-MCNC: 154 MG/DL
GFR SERPL CREATININE-BSD FRML MDRD: 90 ML/MIN/1.73SQ M
GLUCOSE P FAST SERPL-MCNC: 142 MG/DL (ref 65–99)
HBA1C MFR BLD: 7 % (ref 4.2–6.3)
HDLC SERPL-MCNC: 47 MG/DL (ref 40–60)
LDLC SERPL CALC-MCNC: 178 MG/DL (ref 0–100)
MICROALBUMIN UR-MCNC: <5 MG/L (ref 0–20)
MICROALBUMIN/CREAT 24H UR: <6 MG/G CREATININE (ref 0–30)
NONHDLC SERPL-MCNC: 205 MG/DL
POTASSIUM SERPL-SCNC: 3.8 MMOL/L (ref 3.5–5.5)
PROT SERPL-MCNC: 6.8 G/DL (ref 6.4–8.9)
SODIUM SERPL-SCNC: 140 MMOL/L (ref 134–143)
TRIGL SERPL-MCNC: 133 MG/DL (ref 44–166)

## 2019-05-06 PROCEDURE — 80053 COMPREHEN METABOLIC PANEL: CPT

## 2019-05-06 PROCEDURE — 83036 HEMOGLOBIN GLYCOSYLATED A1C: CPT

## 2019-05-06 PROCEDURE — 82570 ASSAY OF URINE CREATININE: CPT

## 2019-05-06 PROCEDURE — 82043 UR ALBUMIN QUANTITATIVE: CPT

## 2019-05-06 PROCEDURE — 36415 COLL VENOUS BLD VENIPUNCTURE: CPT

## 2019-05-06 PROCEDURE — 80061 LIPID PANEL: CPT

## 2019-05-23 ENCOUNTER — OFFICE VISIT (OUTPATIENT)
Dept: BARIATRICS | Facility: CLINIC | Age: 54
End: 2019-05-23

## 2019-05-23 VITALS — WEIGHT: 254.8 LBS | HEIGHT: 64 IN | BODY MASS INDEX: 43.5 KG/M2

## 2019-05-23 DIAGNOSIS — R63.5 ABNORMAL WEIGHT GAIN: ICD-10-CM

## 2019-05-23 PROCEDURE — WMDI30: Performed by: DIETITIAN, REGISTERED

## 2019-05-23 PROCEDURE — RECHECK: Performed by: DIETITIAN, REGISTERED

## 2019-06-12 ENCOUNTER — CLINICAL SUPPORT (OUTPATIENT)
Dept: BARIATRICS | Facility: CLINIC | Age: 54
End: 2019-06-12

## 2019-06-12 VITALS
DIASTOLIC BLOOD PRESSURE: 78 MMHG | HEIGHT: 64 IN | TEMPERATURE: 97.3 F | BODY MASS INDEX: 44.22 KG/M2 | HEART RATE: 56 BPM | SYSTOLIC BLOOD PRESSURE: 120 MMHG | WEIGHT: 259 LBS

## 2019-06-12 DIAGNOSIS — E66.01 OBESITY, CLASS III, BMI 40-49.9 (MORBID OBESITY) (HCC): Primary | ICD-10-CM

## 2019-06-12 DIAGNOSIS — E66.01 MORBID OBESITY (HCC): Primary | ICD-10-CM

## 2019-06-12 PROCEDURE — RECHECK: Performed by: DIETITIAN, REGISTERED

## 2019-06-27 ENCOUNTER — TELEPHONE (OUTPATIENT)
Dept: BARIATRICS | Facility: CLINIC | Age: 54
End: 2019-06-27

## 2019-07-03 NOTE — PROGRESS NOTES
Bariatric Nutrition Assessment Note    Type of surgery    Preop  Surgery Date: TBD- 6 months completed with [facility] weight loss program      Surgeon: Dr Chana Nguyen    Today is here for pre op weight check and dietary counseling       Nutrition Assessment   Rosita Yuen  48 y o   female     Wt with BMI of 25: 148 2#  Pre-Op Excess Wt: 110 8#      [place]  [place] Equation:  BMR : 1775 kcal per day  Estimated calories for weight loss = 2381-5033 kcal per day ( 1-2# per wk wt loss - sedentary )  Estimated protein needs = 65-80 grams per day ( 1 0-1 2 grams/ kg IBW)       Review of History and Medications   Past Medical History:   Diagnosis Date    Acute bronchitis     Anxiety     Arthritis     Asthma     Continuous positive airway pressure dependence     Depression     [facility]     Hypertension     Morbid obesity ([facility]     Sleep apnea     USES C PAP     Past Surgical History:   Procedure Laterality Date     SECTION      CHOLECYSTECTOMY      HYSTERECTOMY      KNEE ARTHROSCOPY       Social History     Socioeconomic History    Marital status: /Civil Union     Spouse name: Not on file    Number of children: Not on file    Years of education: Not on file    Highest education level: Not on file   Occupational History    Not on file   Social Needs    Financial resource strain: Not on file    Food insecurity:     Worry: Not on file     Inability: Not on file    Transportation needs:     Medical: Not on file     Non-medical: Not on file   Tobacco Use    Smoking status: Never Smoker    Smokeless tobacco: Never Used   Substance and Sexual Activity    Alcohol use: Yes     Comment: occasional    Drug use: No    Sexual activity: Yes     Partners: Male   Lifestyle    Physical activity:     Days per week: Not on file     Minutes per session: Not on file    Stress: Not on file   Relationships    Social connections:     Talks on phone: Not on file     Gets together: Not on file     Attends Oriental orthodox service: Not on file     Active member of club or organization: Not on file     Attends meetings of clubs or organizations: Not on file     Relationship status: Not on file    Intimate partner violence:     Fear of current or ex partner: Not on file     Emotionally abused: Not on file     Physically abused: Not on file     Forced sexual activity: Not on file   Other Topics Concern    Not on file   Social History Narrative    Not on file       Current Outpatient Medications:     albuterol (PROVENTIL HFA,VENTOLIN HFA) 90 mcg/act inhaler, Inhale as needed  , Disp: , Rfl:     ALPRAZolam (XANAX) 0 25 mg tablet, alprazolam 0 25 mg tablet as needed, Disp: , Rfl:     amLODIPine (NORVASC) 10 mg tablet, Take 10 mg by mouth daily  , Disp: , Rfl:     Azelastine HCl 137 MCG/SPRAY SOLN, 1 spray by Each Nare route 2 (two) times a day, Disp: , Rfl: 0    bisoprolol (ZEBETA) 10 MG tablet, daily  , Disp: , Rfl:     diclofenac (VOLTAREN) 75 mg EC tablet, daily  , Disp: , Rfl:     EDARBYCLOR 40-25 MG TABS, daily  , Disp: , Rfl:     glucose blood test strip, Check blood sugar QID, Disp: , Rfl:     lamoTRIgine (LaMICtal) 200 MG tablet, daily  , Disp: , Rfl:     Loratadine (CLARITIN) 10 MG CAPS, Take by mouth as needed  , Disp: , Rfl:     metFORMIN (GLUCOPHAGE) 500 mg tablet, Take 500 mg by mouth 2 (two) times a day with meals  , Disp: , Rfl:     methylPREDNISolone (Solu-MEDROL) IV syringe in D5W (PED), Infuse into a venous catheter daily For bilateral knee/every 3 months, Disp: , Rfl:     Sodium Hyaluronate (EUFLEXXA) 20 MG/2ML SOSY, knee injection every 6 months, Disp: , Rfl:     venlafaxine (EFFEXOR-XR) 150 mg 24 hr capsule, , Disp: , Rfl:     venlafaxine (EFFEXOR-XR) 75 mg 24 hr capsule, , Disp: , Rfl:   Food Intake and Lifestyle Assessment   Food Intake Assessment completed via food log brought by patient  Breakfast: 9 am to 10 am - protein bar- Robard bars   Snack: 0 Lunch: 1 or 2 pm 1/2 cheesesteak, or wings and cheese fries OR protein shakes   Snack: 0  Dinner: 7 to 9 pm  1/2 cheese steak , protein shake   Snack: cannoli, or chips or Yaya ice   Beverage intake: water, sugar free beverages and nectars , occasional carbonation  Coffee- G2  Protein supplement: Robard, Nectar shakes   Estimated protein intake per day: 60 grams   Estimated fluid intake per day:80 ounces of fluid or more per day   Meals eaten away from home: 4 to 5 days per week   Typical meal pattern: 2-3 meals per day and 0-1 snacks per day  Eating Behaviors: Large portion sizes, Frequent snacking/ grazing, Mindless eating and Emotional eating  Food allergies or intolerances: Allergies   Allergen Reactions    Amoxicillin Hives    Amoxicillin-Pot Clavulanate Hives    Oxycodone-Acetaminophen Vomiting    Sulfa Antibiotics Hives    Erythromycin GI Intolerance    Wound Dressings Rash     Cultural or Yarsani considerations: none noted    Physical Assessment  Physical Activity  Types of exercise: plans to go to the gym with her daughter   Current physical limitations: severe knee pain     Psychosocial Assessment   Support systems: spouse children( daughter and son) and friends   Socioeconomic factors: works from home 3 to 4 days per week, lives with children     Nutrition Diagnosis( continued )   Diagnosis: Overweight / Obesity (NC-3 3)  Related to: Physical inactivity and Excessive energy intake  As Evidenced by: BMI >25, Excessive energy intake and Unintentional weight gain     Nutrition Prescription: Recommend the following diet  Regular    Interventions and Teaching   Discussed pre-op and post-op nutrition guidelines  Patient educated and handouts provided    Surgical changes to stomach / GI  Capacity of post-surgery stomach  Diet progression  Adequate hydration  Sugar and fat restriction to decrease "dumping syndrome"  Fat restriction to decrease steatorrhea  Expected weight loss  Weight loss plateaus/ possibility of weight regain  Exercise  Suggestions for pre-op diet  Nutrition considerations after surgery  Protein supplements  Meal planning and preparation  Appropriate carbohydrate, protein, and fat intake, and food/fluid choices to maximize safe weight loss, nutrient intake, and tolerance   Dietary and lifestyle changes  Possible problems with poor eating habits  Intuitive eating  Techniques for self monitoring and keeping daily food journal:  Reviewed use of the [place] adal   Potential for food intolerance after surgery, and ways to deal with them including: lactose intolerance, nausea, reflux, vomiting, diarrhea, food intolerance, appetite changes, gas  Vitamin / Mineral supplementation of Multivitamin with minerals, Calcium, Vitamin B12, Iron, Fat Soluble vitamins and Vitamin D:  Instructed to take an adult multivitamin and mineral plus 2000 IU of vitamin D3    Patient provided with gym coupon for Essential Medical Assessment: Yes    Education provided to: patient    Barriers to learning: No barriers identified    Readiness to change: preparation and action    Prior research on procedure: internet, discussed with provider, pre-op class and friends or family    Comprehension: verbalizes understanding     Expected Compliance: good  Workflow  Needs labs, support group, and to scheduled cardiology appointment     Evaluation / Monitoring  Dietitian to Monitor: Eating pattern as discussed Body weight Lab values Physical activity    Goals  Food journal, Complete lession plans 1-6, Eat 3 meals per day and include protein at each meal   Take an adult multivitamin and minearl plus 2000 IU of vitamin D3   Gym with daughter 3 times per week - fitness assessment to determine exercises she can do with knee pain       Time Spent:   30 minutes

## 2019-07-05 ENCOUNTER — OFFICE VISIT (OUTPATIENT)
Dept: BARIATRICS | Facility: CLINIC | Age: 54
End: 2019-07-05

## 2019-07-05 VITALS — HEIGHT: 64 IN | WEIGHT: 260.7 LBS | BODY MASS INDEX: 44.51 KG/M2

## 2019-07-05 DIAGNOSIS — E66.01 OBESITY, CLASS III, BMI 40-49.9 (MORBID OBESITY) (HCC): Primary | ICD-10-CM

## 2019-07-05 PROCEDURE — RECHECK

## 2019-07-05 NOTE — PROGRESS NOTES
Met with patient and discussed the following; psychiatric clearance received and reviewed  Patient cleared by Dr Elif Seth- psychiatrist  Reviewed workflow; patient scheduled with surgeon, plans to attend July support group and will call cardiology to schedule appointment  Emailed coordinator with the above   [place]

## 2019-08-07 ENCOUNTER — OFFICE VISIT (OUTPATIENT)
Dept: BARIATRICS | Facility: CLINIC | Age: 54
End: 2019-08-07
Payer: COMMERCIAL

## 2019-08-07 VITALS
SYSTOLIC BLOOD PRESSURE: 136 MMHG | TEMPERATURE: 98.5 F | DIASTOLIC BLOOD PRESSURE: 84 MMHG | BODY MASS INDEX: 44.47 KG/M2 | HEART RATE: 58 BPM | HEIGHT: 64 IN | WEIGHT: 260.5 LBS

## 2019-08-07 DIAGNOSIS — Z99.89 OBSTRUCTIVE SLEEP APNEA TREATED WITH CONTINUOUS POSITIVE AIRWAY PRESSURE (CPAP): ICD-10-CM

## 2019-08-07 DIAGNOSIS — E11.8 TYPE 2 DIABETES MELLITUS WITH COMPLICATION, WITHOUT LONG-TERM CURRENT USE OF INSULIN (HCC): ICD-10-CM

## 2019-08-07 DIAGNOSIS — M25.562 CHRONIC PAIN OF BOTH KNEES: ICD-10-CM

## 2019-08-07 DIAGNOSIS — G89.29 CHRONIC PAIN OF BOTH KNEES: ICD-10-CM

## 2019-08-07 DIAGNOSIS — G47.33 OBSTRUCTIVE SLEEP APNEA TREATED WITH CONTINUOUS POSITIVE AIRWAY PRESSURE (CPAP): ICD-10-CM

## 2019-08-07 DIAGNOSIS — F32.A DEPRESSION, UNSPECIFIED DEPRESSION TYPE: ICD-10-CM

## 2019-08-07 DIAGNOSIS — I10 ESSENTIAL HYPERTENSION: ICD-10-CM

## 2019-08-07 DIAGNOSIS — E78.2 MIXED HYPERLIPIDEMIA: ICD-10-CM

## 2019-08-07 DIAGNOSIS — E66.01 OBESITY, CLASS III, BMI 40-49.9 (MORBID OBESITY) (HCC): Primary | ICD-10-CM

## 2019-08-07 DIAGNOSIS — M25.561 CHRONIC PAIN OF BOTH KNEES: ICD-10-CM

## 2019-08-07 PROCEDURE — 99204 OFFICE O/P NEW MOD 45 MIN: CPT | Performed by: SURGERY

## 2019-08-07 NOTE — PROGRESS NOTES
BARIATRIC INITIAL CONSULT - BARIATRIC SURGERY    Janie Angeles 48 y o  female MRN: [id number]  Unit/Bed#:  Encounter: [id number]      HPI:  Janie Angeles is a 48 y o  female who presents with a longstanding history of morbid obesity and inability to sustain a meaningful weight loss  Here today to discuss bariatric options  Visit type: initial visit    Symptoms: excess weight and inability to loss weight    Associated Symptoms: depressed mood and anxiety    Associated Conditions: hyperlipidemia, sleep apnea, abdominal obesity and hypergycemia  Disease Complications: diabetes, hypertension and sleep apnea  Weight Loss Interest: high  Previous Diet Trials: low calorie     Exercise Frequency:infrequency  Types of Exercise: walking        Review of Systems   All other systems reviewed and are negative        Historical Information   Past Medical History:   Diagnosis Date    Acute bronchitis     Anxiety     Arthritis     Asthma     Continuous positive airway pressure dependence     Depression     Diabetes mellitus     Hypertension     Morbid obesity     Sleep apnea     USES C PAP     Past Surgical History:   Procedure Laterality Date     SECTION      CHOLECYSTECTOMY      HYSTERECTOMY      KNEE ARTHROSCOPY       Social History   Social History     Substance and Sexual Activity   Alcohol Use Yes    Comment: occasional     Social History     Substance and Sexual Activity   Drug Use No     Social History     Tobacco Use   Smoking Status Never Smoker   Smokeless Tobacco Never Used     Family History: non-contributory    Meds/Allergies   all medications and allergies reviewed  Allergies   Allergen Reactions    Amoxicillin Hives    Amoxicillin-Pot Clavulanate Hives    Oxycodone-Acetaminophen Vomiting    Sulfa Antibiotics Hives    Erythromycin GI Intolerance    Wound Dressings Rash       Objective       Current Vitals:   Blood Pressure: 136/84 (19 1104)  Pulse: 58 (19 1104)  Temperature: 98 5 °F (36 9 °C) (08/07/19 1104)  Temp Source: Tympanic (08/07/19 1104)  Height: 5' 4" (162 6 cm) (08/07/19 1104)  Weight - Scale: 118 kg (260 lb 8 oz) (08/07/19 1104)        Invasive Devices     None                 Physical Exam   Constitutional: She is oriented to person, place, and time  She appears well-developed and well-nourished  No distress  HENT:   Head: Normocephalic and atraumatic  Right Ear: External ear normal    Left Ear: External ear normal    Nose: Nose normal    Eyes: Conjunctivae are normal  Right eye exhibits no discharge  Left eye exhibits no discharge  No scleral icterus  Neurological: She is alert and oriented to person, place, and time  Skin: She is not diaphoretic  Psychiatric: She has a normal mood and affect  Her behavior is normal  Judgment and thought content normal    Vitals reviewed  Lab Results: I have personally reviewed pertinent lab results  Imaging: I have personally reviewed pertinent reports  EKG, Pathology, and Other Studies: I have personally reviewed pertinent reports  Assessment/PLAN:    48 y o  yo female with a long standing h/o of obesity and inability to sustain any meaningful weight loss on her own despite several attempts  She is interested in the Laparoscopic Liset-en-Y gastric bypass  As a part of her pre op evaluation, she will be referred to a cardiologist and for a sleep evaluation and consult  She needs an EGD to evaluate the anatomy of her GI tract prior to the operation  I have spent over 45 minutes with her face to face in the office today discussing her options and details of the surgery  We have seen an animation of the surgery on the computer that illustrates how the operation is done and how the anatomy will be altered with the procedure  Over 50% of this was coordinating care  She was given the opportunity to ask questions and I have answered all of them    I have discussed and educated the patient with regards to the components of our multidisciplinary program and the importance of compliance and follow up in the post operative period  The patient was also instructed with regards to the importance of behavior modification, nutritional counseling, support meeting attendance and lifestyle changes that are important to ensure success  Although there is a great statistical chance of improvement or even resolution of most of her associated comorbidities, the results vary from patient to patient and they largely depend on her commitment and compliance  She needs to lose 13 lbs prior to the operation        Tony Centeno MD  8/7/2019  11:26 AM

## 2019-08-07 NOTE — LETTER
2019     Dash Montenegro MD  [address] [place] [address]    Patient: Alexis Marshall   YOB: 1965   Date of Visit: 2019       Dear Dr Yobany Burch:    Thank you for referring Alexis Marshall to me for evaluation  Below are my notes for this consultation  If you have questions, please do not hesitate to call me  I look forward to following your patient along with you  Sincerely,        Chalmer Apley, MD        CC: No Recipients  Chalmer Apley, MD  2019 11:34 AM  Incomplete      BARIATRIC INITIAL CONSULT - BARIATRIC SURGERY    Rosita Yuen 48 y o  female MRN: [id number]  Unit/Bed#:  Encounter: [id number]      HPI:  Alexis Marshall is a 48 y o  female who presents with a longstanding history of morbid obesity and inability to sustain a meaningful weight loss  Here today to discuss bariatric options  Visit type: initial visit    Symptoms: excess weight and inability to loss weight    Associated Symptoms: depressed mood and anxiety    Associated Conditions: hyperlipidemia, sleep apnea, abdominal obesity and hypergycemia  Disease Complications: diabetes, hypertension and sleep apnea  Weight Loss Interest: high  Previous Diet Trials: low calorie     Exercise Frequency:infrequency  Types of Exercise: walking        Review of Systems   All other systems reviewed and are negative        Historical Information   Past Medical History:   Diagnosis Date    Acute bronchitis     Anxiety     Arthritis     Asthma     Continuous positive airway pressure dependence     Depression     Diabetes mellitus     Hypertension     Morbid obesity     Sleep apnea     USES C PAP     Past Surgical History:   Procedure Laterality Date     SECTION      CHOLECYSTECTOMY      HYSTERECTOMY      KNEE ARTHROSCOPY       Social History   Social History     Substance and Sexual Activity   Alcohol Use Yes    Comment: occasional     Social History     Substance and Sexual Activity Drug Use No     Social History     Tobacco Use   Smoking Status Never Smoker   Smokeless Tobacco Never Used     Family History: non-contributory    Meds/Allergies   all medications and allergies reviewed  Allergies   Allergen Reactions    Amoxicillin Hives    Amoxicillin-Pot Clavulanate Hives    Oxycodone-Acetaminophen Vomiting    Sulfa Antibiotics Hives    Erythromycin GI Intolerance    Wound Dressings Rash       Objective       Current Vitals:   Blood Pressure: 136/84 (08/07/19 1104)  Pulse: 58 (08/07/19 1104)  Temperature: 98 5 °F (36 9 °C) (08/07/19 1104)  Temp Source: Tympanic (08/07/19 1104)  Height: 5' 4" (162 6 cm) (08/07/19 1104)  Weight - Scale: 118 kg (260 lb 8 oz) (08/07/19 1104)        Invasive Devices     None                 Physical Exam   Constitutional: She is oriented to person, place, and time  She appears well-developed and well-nourished  No distress  HENT:   Head: Normocephalic and atraumatic  Right Ear: External ear normal    Left Ear: External ear normal    Nose: Nose normal    Eyes: Conjunctivae are normal  Right eye exhibits no discharge  Left eye exhibits no discharge  No scleral icterus  Neurological: She is alert and oriented to person, place, and time  Skin: She is not diaphoretic  Psychiatric: She has a normal mood and affect  Her behavior is normal  Judgment and thought content normal    Vitals reviewed  Lab Results: I have personally reviewed pertinent lab results  Imaging: I have personally reviewed pertinent reports  EKG, Pathology, and Other Studies: I have personally reviewed pertinent reports  Assessment/PLAN:    48 y o  yo female with a long standing h/o of obesity and inability to sustain any meaningful weight loss on her own despite several attempts  She is interested in the Laparoscopic Liset-en-Y gastric bypass  As a part of her pre op evaluation, she will be referred to a cardiologist and for a sleep evaluation and consult      She needs an EGD to evaluate the anatomy of her GI tract prior to the operation  I have spent over 45 minutes with her face to face in the office today discussing her options and details of the surgery  We have seen an animation of the surgery on the computer that illustrates how the operation is done and how the anatomy will be altered with the procedure  Over 50% of this was coordinating care  She was given the opportunity to ask questions and I have answered all of them  I have discussed and educated the patient with regards to the components of our multidisciplinary program and the importance of compliance and follow up in the post operative period  The patient was also instructed with regards to the importance of behavior modification, nutritional counseling, support meeting attendance and lifestyle changes that are important to ensure success  Although there is a great statistical chance of improvement or even resolution of most of her associated comorbidities, the results vary from patient to patient and they largely depend on her commitment and compliance  She needs to lose 13 lbs prior to the operation        Jasmine Reed MD  8/7/2019  11:26 AM

## 2019-08-12 ENCOUNTER — CONSULT (OUTPATIENT)
Dept: CARDIOLOGY CLINIC | Facility: CLINIC | Age: 54
End: 2019-08-12
Payer: COMMERCIAL

## 2019-08-12 VITALS
HEIGHT: 64 IN | HEART RATE: 64 BPM | WEIGHT: 266 LBS | SYSTOLIC BLOOD PRESSURE: 122 MMHG | DIASTOLIC BLOOD PRESSURE: 78 MMHG | BODY MASS INDEX: 45.41 KG/M2

## 2019-08-12 DIAGNOSIS — E78.2 MIXED HYPERLIPIDEMIA: ICD-10-CM

## 2019-08-12 DIAGNOSIS — G47.33 OBSTRUCTIVE SLEEP APNEA TREATED WITH CONTINUOUS POSITIVE AIRWAY PRESSURE (CPAP): ICD-10-CM

## 2019-08-12 DIAGNOSIS — I10 ESSENTIAL HYPERTENSION: ICD-10-CM

## 2019-08-12 DIAGNOSIS — Z01.810 PREPROCEDURAL CARDIOVASCULAR EXAMINATION: Primary | ICD-10-CM

## 2019-08-12 DIAGNOSIS — Z99.89 OBSTRUCTIVE SLEEP APNEA TREATED WITH CONTINUOUS POSITIVE AIRWAY PRESSURE (CPAP): ICD-10-CM

## 2019-08-12 PROCEDURE — 93000 ELECTROCARDIOGRAM COMPLETE: CPT | Performed by: INTERNAL MEDICINE

## 2019-08-12 PROCEDURE — 99204 OFFICE O/P NEW MOD 45 MIN: CPT | Performed by: INTERNAL MEDICINE

## 2019-08-12 RX ORDER — ATORVASTATIN CALCIUM 40 MG/1
40 TABLET, FILM COATED ORAL DAILY
COMMUNITY
End: 2020-02-26 | Stop reason: ALTCHOICE

## 2019-08-12 NOTE — PROGRESS NOTES
[facility]     [facility] / 48 y o  female / : 1965 / MRN: [id number]  Date of Visit: 19    ASSESSMENT/PLAN  1  Preop cardiac evaluation   · Planned for gastric bypass surgery in September, not scheduled yet   · Okay to proceed with surgery without cardiac further testing     2  ADAM - on CPAP    3  HTN- controlled   · Continue amlodipine and bisoprolol     4  HLD  · LDL persistently 178 (2018 and 2019)   · Recently started on atorvastatin  · Recommend rechecking lipid panel after surgery/further weight loss to reassess need for statin therapy    5  DM-2, on metformin  · A1c 7 0 on 19    Follow up on a PRN basis     SUBJECTIVE:  HPI: [facility] is a 48 y o  female who presents in consultation with Dr Erasmo Whitman for preop cardiac evaluation prior to gastric bypass surgery tentatively planned for September  She tried the medical weight loss program but did not make adequate progress, so she is excited for bypass surgery  Exercise is limited by knee pain  She is looking forward to being able to do more things with her family in the near future  She denies chest pain, dyspnea, edema, near-syncope and syncope      Cardiac testing:    EKG today - sinus rhythm with PACs     Family History: Father with CABG in his 46s  Social history: Never smoker, rare alcohol use    Allergies   Allergen Reactions    Amoxicillin Hives    Amoxicillin-Pot Clavulanate Hives    Oxycodone-Acetaminophen Vomiting    Sulfa Antibiotics Hives    Erythromycin GI Intolerance    Wound Dressings Rash         Current Outpatient Medications:     albuterol (PROVENTIL HFA,VENTOLIN HFA) 90 mcg/act inhaler, Inhale as needed  , Disp: , Rfl:     ALPRAZolam (XANAX) 0 25 mg tablet, alprazolam 0 25 mg tablet as needed, Disp: , Rfl:     amLODIPine (NORVASC) 10 mg tablet, Take 10 mg by mouth daily  , Disp: , Rfl:     atorvastatin (LIPITOR) 40 mg tablet, Take 40 mg by mouth daily, Disp: , Rfl:     Azelastine HCl 137 MCG/SPRAY SOLN, 1 spray by Each Nare route 2 (two) times a day, Disp: , Rfl: 0    bisoprolol (ZEBETA) 10 MG tablet, daily  , Disp: , Rfl:     diclofenac (VOLTAREN) 75 mg EC tablet, daily  , Disp: , Rfl:     EDARBYCLOR 40-25 MG TABS, daily  , Disp: , Rfl:     glucose blood test strip, Check blood sugar QID, Disp: , Rfl:     lamoTRIgine (LaMICtal) 200 MG tablet, daily  , Disp: , Rfl:     Loratadine (CLARITIN) 10 MG CAPS, Take by mouth as needed  , Disp: , Rfl:     metFORMIN (GLUCOPHAGE) 500 mg tablet, Take 500 mg by mouth 2 (two) times a day with meals  , Disp: , Rfl:     methylPREDNISolone (Solu-MEDROL) IV syringe in D5W (PED), Infuse into a venous catheter daily For bilateral knee/every 3 months, Disp: , Rfl:     Sodium Hyaluronate (EUFLEXXA) 20 MG/2ML SOSY, knee injection every 6 months, Disp: , Rfl:     TRAMADOL HCL PO, Take by mouth, Disp: , Rfl:     venlafaxine (EFFEXOR-XR) 150 mg 24 hr capsule, , Disp: , Rfl:     venlafaxine (EFFEXOR-XR) 75 mg 24 hr capsule, , Disp: , Rfl:     Past Medical History:   Diagnosis Date    Acute bronchitis     Anxiety     Arthritis     Asthma     Continuous positive airway pressure dependence     Depression     Diabetes mellitus     Hypertension     Morbid obesity     Sleep apnea     USES C PAP       Family History   Problem Relation Age of Onset    Breast cancer Mother     Hypertension Mother     Brain cancer Mother     Prostate cancer Father     Melanoma Father     Diabetes Father     Hypertension Father     Heart disease Father     Skin cancer Father     Thyroid disease Neg Hx     Stroke Neg Hx        Past Surgical History:   Procedure Laterality Date     SECTION      CHOLECYSTECTOMY      HYSTERECTOMY      KNEE ARTHROSCOPY         Social History     Socioeconomic History    Marital status: /Civil Union     Spouse name: Not on file    Number of children: Not on file    Years of education: Not on file   [employer] education level: Not on file   Occupational History    Not on file   Social Needs    Financial resource strain: Not on file    Food insecurity:     Worry: Not on file     Inability: Not on file    Transportation needs:     Medical: Not on file     Non-medical: Not on file   Tobacco Use    Smoking status: Never Smoker    Smokeless tobacco: Never Used   Substance and Sexual Activity    Alcohol use: Yes     Comment: occasional    Drug use: No    Sexual activity: Yes     Partners: Male   Lifestyle    Physical activity:     Days per week: Not on file     Minutes per session: Not on file    Stress: Not on file   Relationships    Social connections:     Talks on phone: Not on file     Gets together: Not on file     Attends [employer] service: Not on file     Active member of club or organization: Not on file     Attends meetings of clubs or organizations: Not on file     Relationship status: Not on file    Intimate partner violence:     Fear of current or ex partner: Not on file     Emotionally abused: Not on file     Physically abused: Not on file     Forced sexual activity: Not on file   Other Topics Concern    Not on file   Social History Narrative    Not on file     Review of Systems   Constitution: Negative  HENT: Negative  Eyes: Negative  Cardiovascular: Negative for chest pain, claudication, dyspnea on exertion, irregular heartbeat, leg swelling, near-syncope, orthopnea, palpitations, paroxysmal nocturnal dyspnea and syncope  Respiratory: Negative for cough, shortness of breath and wheezing  Endocrine: Negative  Skin: Negative  Musculoskeletal: Positive for joint pain (knee pain)  Gastrointestinal: Negative  Genitourinary: Negative  Neurological: Negative for dizziness and light-headedness  Psychiatric/Behavioral: Negative          OBJECTIVE:  Vitals: /78   Pulse 64   Ht 5' 4" (1 626 m)   Wt 121 kg (266 lb)   BMI 45 66 kg/m²     Physical exam:   Physical Exam Constitutional: She is oriented to person, place, and time  She appears well-developed and well-nourished  No distress  HENT:   Head: Normocephalic and atraumatic  Eyes: EOM are normal  No scleral icterus  Neck: Normal range of motion  Neck supple  Cardiovascular: Normal rate, regular rhythm, S1 normal and S2 normal    No murmur heard  Pulmonary/Chest: Effort normal and breath sounds normal  She has no wheezes  She has no rales  Abdominal: Soft  Bowel sounds are normal    Musculoskeletal: She exhibits no edema  Neurological: She is alert and oriented to person, place, and time  Skin: Skin is warm and dry  Psychiatric: She has a normal mood and affect  Her behavior is normal    Nursing note and vitals reviewed        Lab Results:   Lab Results   Component Value Date    HGBA1C 7 0 (H) 05/06/2019    HGBA1C 6 8 (H) 09/04/2018     No results found for: CHOL  Lab Results   Component Value Date    HDL 47 05/06/2019    HDL 42 09/04/2018     Lab Results   Component Value Date    LDLCALC 178 (H) 05/06/2019    LDLCALC 178 (H) 09/04/2018     Lab Results   Component Value Date    TRIG 133 05/06/2019    TRIG 149 09/04/2018     No results found for: CHOLHDL

## 2019-08-23 ENCOUNTER — OFFICE VISIT (OUTPATIENT)
Dept: URGENT CARE | Facility: HOSPITAL | Age: 54
End: 2019-08-23
Payer: COMMERCIAL

## 2019-08-23 ENCOUNTER — APPOINTMENT (OUTPATIENT)
Dept: LAB | Facility: HOSPITAL | Age: 54
End: 2019-08-23
Payer: COMMERCIAL

## 2019-08-23 VITALS
SYSTOLIC BLOOD PRESSURE: 142 MMHG | DIASTOLIC BLOOD PRESSURE: 100 MMHG | HEART RATE: 68 BPM | BODY MASS INDEX: 43.54 KG/M2 | OXYGEN SATURATION: 97 % | TEMPERATURE: 98.9 F | RESPIRATION RATE: 20 BRPM | WEIGHT: 255 LBS | HEIGHT: 64 IN

## 2019-08-23 DIAGNOSIS — I10 ESSENTIAL HYPERTENSION: ICD-10-CM

## 2019-08-23 DIAGNOSIS — G47.33 OBSTRUCTIVE SLEEP APNEA TREATED WITH CONTINUOUS POSITIVE AIRWAY PRESSURE (CPAP): ICD-10-CM

## 2019-08-23 DIAGNOSIS — Z99.89 OBSTRUCTIVE SLEEP APNEA TREATED WITH CONTINUOUS POSITIVE AIRWAY PRESSURE (CPAP): ICD-10-CM

## 2019-08-23 DIAGNOSIS — E11.8 TYPE 2 DIABETES MELLITUS WITH COMPLICATION, WITHOUT LONG-TERM CURRENT USE OF INSULIN (HCC): ICD-10-CM

## 2019-08-23 DIAGNOSIS — E66.01 OBESITY, CLASS III, BMI 40-49.9 (MORBID OBESITY) (HCC): ICD-10-CM

## 2019-08-23 DIAGNOSIS — B02.9 HERPES ZOSTER WITHOUT COMPLICATION: Primary | ICD-10-CM

## 2019-08-23 PROCEDURE — 99203 OFFICE O/P NEW LOW 30 MIN: CPT | Performed by: NURSE PRACTITIONER

## 2019-08-23 PROCEDURE — 36415 COLL VENOUS BLD VENIPUNCTURE: CPT

## 2019-08-23 RX ORDER — VALACYCLOVIR HYDROCHLORIDE 1 G/1
1000 TABLET, FILM COATED ORAL 3 TIMES DAILY
Qty: 21 TABLET | Refills: 0 | Status: SHIPPED | OUTPATIENT
Start: 2019-08-23 | End: 2019-10-09

## 2019-08-23 NOTE — PROGRESS NOTES
[facility]        NAME: Mile Genao is a 48 y o  female  : 1965    MRN: [id number]  DATE: 2019  TIME: 10:49 AM    Assessment and Plan   Herpes zoster without complication [W13 0]  1  Herpes zoster without complication  valACYclovir (VALTREX) 1,000 mg tablet         Patient Instructions     Take medication as directed  Follow up with PCP in 3-5 days  Proceed to  ER if symptoms worsen  Chief Complaint     Chief Complaint   Patient presents with    Arm Pain     right arm pain x 1week         History of Present Illness       Patient is a 61-year-old female presents for 1 week history of right arm pain, pruritus, and rash  Patient describes pain as burning and tight  Patient states it itches but my skin hurts too much to scratch it " Denies any fever, chills, or body aches  Denies any headache, dizziness, or feeling lightheaded  Denies history of shingles  Denies any arm swelling or drainage  Review of Systems   Review of Systems   Constitutional: Negative for chills, diaphoresis, fatigue and fever  HENT: Negative  Eyes: Negative for photophobia and visual disturbance  Respiratory: Negative for cough, chest tightness and shortness of breath  Cardiovascular: Negative for chest pain and palpitations  Gastrointestinal: Negative for abdominal pain, diarrhea, nausea and vomiting  Musculoskeletal: Negative for arthralgias, back pain, joint swelling, myalgias, neck pain and neck stiffness  Skin: Positive for rash  Neurological: Negative for dizziness, facial asymmetry, weakness, light-headedness, numbness and headaches           Current Medications       Current Outpatient Medications:     albuterol (PROVENTIL HFA,VENTOLIN HFA) 90 mcg/act inhaler, Inhale as needed  , Disp: , Rfl:     amLODIPine (NORVASC) 10 mg tablet, Take 10 mg by mouth daily  , Disp: , Rfl:     atorvastatin (LIPITOR) 40 mg tablet, Take 40 mg by mouth daily, Disp: , Rfl:     bisoprolol (ZEBETA) 10 MG tablet, daily  , Disp: , Rfl:     diclofenac (VOLTAREN) 75 mg EC tablet, daily  , Disp: , Rfl:     EDARBYCLOR 40-25 MG TABS, daily  , Disp: , Rfl:     glucose blood test strip, Check blood sugar QID, Disp: , Rfl:     lamoTRIgine (LaMICtal) 200 MG tablet, daily  , Disp: , Rfl:     Loratadine (CLARITIN) 10 MG CAPS, Take by mouth as needed  , Disp: , Rfl:     metFORMIN (GLUCOPHAGE) 500 mg tablet, Take 500 mg by mouth 2 (two) times a day with meals  , Disp: , Rfl:     methylPREDNISolone (Solu-MEDROL) IV syringe in D5W (PED), Infuse into a venous catheter daily For bilateral knee/every 3 months, Disp: , Rfl:     Sodium Hyaluronate (EUFLEXXA) 20 MG/2ML SOSY, knee injection every 6 months, Disp: , Rfl:     TRAMADOL HCL PO, Take by mouth, Disp: , Rfl:     venlafaxine (EFFEXOR-XR) 150 mg 24 hr capsule, , Disp: , Rfl:     venlafaxine (EFFEXOR-XR) 75 mg 24 hr capsule, , Disp: , Rfl:     ALPRAZolam (XANAX) 0 25 mg tablet, alprazolam 0 25 mg tablet as needed, Disp: , Rfl:     Azelastine HCl 137 MCG/SPRAY SOLN, 1 spray by Each Nare route 2 (two) times a day, Disp: , Rfl: 0    valACYclovir (VALTREX) 1,000 mg tablet, Take 1 tablet (1,000 mg total) by mouth 3 (three) times a day for 7 days, Disp: 21 tablet, Rfl: 0    Current Allergies     Allergies as of 08/23/2019 - Reviewed 08/23/2019   Allergen Reaction Noted    Amoxicillin Hives 09/30/2009    Amoxicillin-pot clavulanate Hives     Oxycodone-acetaminophen Vomiting     Sulfa antibiotics Hives     Erythromycin GI Intolerance 09/30/2009    Wound dressings Rash             The following portions of the patient's history were reviewed and updated as appropriate: allergies, current medications, past family history, past medical history, past social history, past surgical history and problem list      Past Medical History:   Diagnosis Date    Acute bronchitis     Anxiety     Arthritis     Asthma     Continuous positive airway pressure dependence  Depression     Diabetes mellitus     Hypertension     Morbid obesity     Sleep apnea     USES C PAP       Past Surgical History:   Procedure Laterality Date     SECTION      CHOLECYSTECTOMY      HYSTERECTOMY      KNEE ARTHROSCOPY         Family History   Problem Relation Age of Onset   [employer] Breast cancer Mother     Hypertension Mother    [employer] Brain cancer Mother     Prostate cancer Father     Melanoma Father     Diabetes Father     Hypertension Father     Heart disease Father     Skin cancer Father     Thyroid disease Neg Hx     Stroke Neg Hx          Medications have been verified  Objective   /100   Pulse 68   Temp 98 9 °F (37 2 °C)   Resp 20   Ht 5' 4" (1 626 m)   Wt 116 kg (255 lb)   SpO2 97%   BMI 43 77 kg/m²        Physical Exam     Physical Exam   Constitutional: She is oriented to person, place, and time  She appears well-developed and well-nourished  No distress  HENT:   Head: Normocephalic and atraumatic  Eyes: Pupils are equal, round, and reactive to light  Conjunctivae and EOM are normal    Cardiovascular: Normal rate, regular rhythm, S1 normal, S2 normal, normal heart sounds, intact distal pulses and normal pulses  Pulmonary/Chest: Effort normal and breath sounds normal    Neurological: She is alert and oriented to person, place, and time  GCS eye subscore is 4  GCS verbal subscore is 5  GCS motor subscore is 6  Skin: Skin is warm and dry  Capillary refill takes less than 2 seconds  Rash noted  Rash is maculopapular and vesicular  She is not diaphoretic

## 2019-08-23 NOTE — PATIENT INSTRUCTIONS
Shingles   WHAT YOU NEED TO KNOW:   Shingles is a painful infection caused by the same virus that causes chickenpox (varicella-zoster virus)  After you get chickenpox, the virus stays in your body for several years without causing any symptoms  Shingles occurs when the virus becomes active again  Once active, the virus will travel along a nerve to your skin and cause a rash  DISCHARGE INSTRUCTIONS:   Return to the emergency department if:   · You have painful, red, warm skin around the blisters, or the blisters drain pus  · Your neck is stiff or you have trouble moving it  · You have trouble moving your arms, legs, or face  · You have a seizure  · You have weakness in an arm or leg  · You become confused, or have difficulty speaking  · You have dizziness, a severe headache, hearing or vision loss  Contact your healthcare provider if:   · You feel weak or have a headache  · You have a cough, chills, or a fever  · You have abdominal pain, nausea, or vomiting  · Your rash becomes more itchy or painful  · Your rash spreads to other parts of your body  · Your pain worsens and does not go away even after taking medicine  · You have questions or concerns about your condition or care  Medicines:   · Antiviral medicine  helps decrease symptoms and healing time  They may also decrease your risk of developing nerve pain  You will need to start taking them within 3 days of the start of symptoms to prevent nerve pain  · Pain medicine  may be prescribed or suggested by your healthcare provider  You may need NSAIDs, acetaminophen, or opioid medicine depending on how much pain you are in  Do not wait until the pain is severe before you take more pain medicine  · Topical anesthetics  are used to numb the skin and decrease pain  They can be a cream, gel, spray, or patch  · Anticonvulsants  decrease nerve pain and may help you sleep at night      · Antidepressants  may be used to decrease nerve pain  · Take your medicine as directed  Contact your healthcare provider if you think your medicine is not helping or if you have side effects  Tell him of her if you are allergic to any medicine  Keep a list of the medicines, vitamins, and herbs you take  Include the amounts, and when and why you take them  Bring the list or the pill bottles to follow-up visits  Carry your medicine list with you in case of an emergency  Follow up with your healthcare provider as directed:  Write down your questions so you remember to ask them during your visits  Self-care:  Keep your rash clean and dry  Cover your rash with a bandage or clothing  Do not use bandages that stick to your skin  The sticky part may irritate your skin and make your rash last longer  Prevent the spread of shingles: The virus can be passed to a person who has never had chickenpox  This person may get chickenpox, but not shingles  You may pass the virus to others as long as you have a rash  The virus is spread by direct contact with the fluid from the blisters  Usually, you cannot spread the virus once the blisters dry up  Prevent shingles or another shingles outbreak:  A vaccine may be given to help prevent shingles  Ask for more information about this vaccine  © 2017 [address] Information is for End User's use only and may not be sold, redistributed or otherwise used for commercial purposes  All illustrations and images included in CareNotes® are the copyrighted property of [employer]  or Karlo Woodward  The above information is an  only  It is not intended as medical advice for individual conditions or treatments  Talk to your doctor, nurse or pharmacist before following any medical regimen to see if it is safe and effective for you

## 2019-08-27 RX ORDER — SODIUM CHLORIDE 9 MG/ML
125 INJECTION, SOLUTION INTRAVENOUS CONTINUOUS
Status: CANCELLED | OUTPATIENT
Start: 2019-08-27

## 2019-08-28 ENCOUNTER — HOSPITAL ENCOUNTER (OUTPATIENT)
Dept: GASTROENTEROLOGY | Facility: HOSPITAL | Age: 54
Setting detail: OUTPATIENT SURGERY
Discharge: HOME | End: 2019-08-28
Attending: SURGERY

## 2019-09-16 DIAGNOSIS — Z99.89 OBSTRUCTIVE SLEEP APNEA TREATED WITH CONTINUOUS POSITIVE AIRWAY PRESSURE (CPAP): ICD-10-CM

## 2019-09-16 DIAGNOSIS — E66.01 OBESITY, CLASS III, BMI 40-49.9 (MORBID OBESITY) (HCC): Primary | ICD-10-CM

## 2019-09-16 DIAGNOSIS — E11.8 TYPE 2 DIABETES MELLITUS WITH COMPLICATION, WITHOUT LONG-TERM CURRENT USE OF INSULIN (HCC): ICD-10-CM

## 2019-09-16 DIAGNOSIS — E78.2 MIXED HYPERLIPIDEMIA: ICD-10-CM

## 2019-09-16 DIAGNOSIS — Z01.812 BLOOD TESTS PRIOR TO TREATMENT OR PROCEDURE: ICD-10-CM

## 2019-09-16 DIAGNOSIS — G47.33 OBSTRUCTIVE SLEEP APNEA TREATED WITH CONTINUOUS POSITIVE AIRWAY PRESSURE (CPAP): ICD-10-CM

## 2019-09-16 DIAGNOSIS — I10 ESSENTIAL HYPERTENSION: ICD-10-CM

## 2019-09-16 DIAGNOSIS — F32.A DEPRESSION, UNSPECIFIED DEPRESSION TYPE: ICD-10-CM

## 2019-09-16 DIAGNOSIS — E88.81 DYSMETABOLIC SYNDROME X: ICD-10-CM

## 2019-09-17 ENCOUNTER — OFFICE VISIT (OUTPATIENT)
Dept: SLEEP CENTER | Facility: CLINIC | Age: 54
End: 2019-09-17
Payer: COMMERCIAL

## 2019-09-17 VITALS
WEIGHT: 263 LBS | HEIGHT: 64 IN | SYSTOLIC BLOOD PRESSURE: 134 MMHG | BODY MASS INDEX: 44.9 KG/M2 | DIASTOLIC BLOOD PRESSURE: 88 MMHG | HEART RATE: 64 BPM

## 2019-09-17 DIAGNOSIS — Z99.89 OBSTRUCTIVE SLEEP APNEA TREATED WITH CONTINUOUS POSITIVE AIRWAY PRESSURE (CPAP): Primary | ICD-10-CM

## 2019-09-17 DIAGNOSIS — G47.33 OBSTRUCTIVE SLEEP APNEA TREATED WITH CONTINUOUS POSITIVE AIRWAY PRESSURE (CPAP): Primary | ICD-10-CM

## 2019-09-17 DIAGNOSIS — E66.01 OBESITY, CLASS III, BMI 40-49.9 (MORBID OBESITY) (HCC): ICD-10-CM

## 2019-09-17 PROCEDURE — 99214 OFFICE O/P EST MOD 30 MIN: CPT | Performed by: NURSE PRACTITIONER

## 2019-09-17 NOTE — PATIENT INSTRUCTIONS
1   Continue use of CPAP equipment nightly  2  Continue to clean your equipment, as discussed  3  Contact the Sleep [address] [place] [address] with any questions or concerns prior to your next visit, as needed  4  Schedule visit for follow-up in 1 year    You may call [facility] directly for supplies at  [phone] ext [phone]      Nursing Support:  When: Monday through Friday 7A-5PM except holidays  Where: Our direct line is [phone]  If you are having a true emergency please call 911  In the event that the line is busy or it is after hours please leave a voice message and we will return your call  Please speak clearly, leaving your full name, birth date, best number to reach you and the reason for your call  Medication refills: We will need the name of the medication, the dosage, the ordering provider, whether you get a 30 or 90 day refill, and the pharmacy name and address  Medications will be ordered by the provider only  Nurses cannot call in prescriptions  Please allow 7 days for medication refills  Physician requested updates: If your provider requested that you call with an update after starting medication, please be ready to provide us the medication and dosage, what time you take your medication, the time you attempt to fall asleep, time you fall asleep, when you wake up, and what time you get out of bed  Sleep Study Results: We will contact you with sleep study results and/or next steps after the physician has reviewed your testing

## 2019-09-17 NOTE — PROGRESS NOTES
Progress Note - [facility] 48 y o  female   :1965, MRN: [id number]  2019          Follow Up Evaluation / Problem:     Severe Obstructive Sleep Apnea  Morbid Obesity      Thank you for the opportunity of participating in the evaluation and care of this patient in the Sleep Clinic at [facility]  HPI: Man Keith is a 48y o  year old female  The patient presents for follow up of severe obstructive sleep apnea  She completed an overnight diagnostic polysomnogram in 2018, which identified severe obstructive sleep apnea  She had symptoms of excessive daytime sleepiness, frequent night time awakenings  and loud snoring  She began the use of AutoPAP in December  She initially had some difficulty tolerating a low pressure of 5cm  Pressure was increased to 7cm to 14cm  She then began to have difficulty with removing the mask and turning off the machine in her sleep  Pressure range was changed to 7-12cm  She is here to review compliance and effectiveness of treatment      Review of Systems      Genitourinary none   Cardiology none   Gastrointestinal none   Neurology forgetfulness, poor concentration or confusion, , difficulty with memory and balance problems   Constitutional fatigue   Integumentary itching   Psychiatry none   Musculoskeletal joint pain   Pulmonary none   ENT none   Endocrine none   Hematological none       Current Outpatient Medications:     albuterol (PROVENTIL HFA,VENTOLIN HFA) 90 mcg/act inhaler, Inhale as needed  , Disp: , Rfl:     ALPRAZolam (XANAX) 0 25 mg tablet, alprazolam 0 25 mg tablet as needed, Disp: , Rfl:     amLODIPine (NORVASC) 10 mg tablet, Take 10 mg by mouth daily  , Disp: , Rfl:     atorvastatin (LIPITOR) 40 mg tablet, Take 40 mg by mouth daily, Disp: , Rfl:     bisoprolol (ZEBETA) 10 MG tablet, daily  , Disp: , Rfl:     diclofenac (VOLTAREN) 75 mg EC tablet, daily  , Disp: , Rfl:     EDARBYCLOR 40-25 MG TABS, daily  , Disp: , Rfl:     glucose blood test strip, Check blood sugar QID, Disp: , Rfl:     lamoTRIgine (LaMICtal) 200 MG tablet, daily  , Disp: , Rfl:     metFORMIN (GLUCOPHAGE) 500 mg tablet, Take 500 mg by mouth 2 (two) times a day with meals  , Disp: , Rfl:     methylPREDNISolone (Solu-MEDROL) IV syringe in D5W (PED), Infuse into a venous catheter daily For bilateral knee/every 3 months, Disp: , Rfl:     Sodium Hyaluronate (EUFLEXXA) 20 MG/2ML SOSY, knee injection every 6 months, Disp: , Rfl:     TRAMADOL HCL PO, Take by mouth, Disp: , Rfl:     venlafaxine (EFFEXOR-XR) 150 mg 24 hr capsule, , Disp: , Rfl:     venlafaxine (EFFEXOR-XR) 75 mg 24 hr capsule, , Disp: , Rfl:     Azelastine HCl 137 MCG/SPRAY SOLN, 1 spray by Each Nare route 2 (two) times a day, Disp: , Rfl: 0    Loratadine (CLARITIN) 10 MG CAPS, Take by mouth as needed  , Disp: , Rfl:     valACYclovir (VALTREX) 1,000 mg tablet, Take 1 tablet (1,000 mg total) by mouth 3 (three) times a day for 7 days, Disp: 21 tablet, Rfl: 0    [place] Sleepiness Scale  Sitting and reading: Slight chance of dozing  Watching TV: Slight chance of dozing  Sitting, inactive in a public place (e g  a theatre or a meeting): Slight chance of dozing  As a passenger in a car for an hour without a break: Slight chance of dozing  Lying down to rest in the afternoon when circumstances permit: High chance of dozing  Sitting and talking to someone: Would never doze  Sitting quietly after a lunch without alcohol: Would never doze  In a car, while stopped for a few minutes in traffic: Would never doze  Total score: 7              Vitals:    09/17/19 1500   BP: 134/88   Pulse: 64   Weight: 119 kg (263 lb)   Height: 5' 4" (1 626 m)       Body mass index is 45 14 kg/m²      Neck Circumference: 16       EPWORTH SLEEPINESS SCORE  Total score: 7      Past History Since Last Sleep Center Visit:   She reports that she is sleeping very well with her current nasal pillow mask with equipment set at a range of 7cm to 12cm  She uses the [employer] and reports that her AHI averages 6-8  She no longer wakes up frequently and she does not remove or shut off the equipment  She plans to have bariatric surgery at the ending of October/beginning of November  The patient reports that she cleans the equipment appropriately and changes supplies on a regular basis  The review of systems and following portions of the patient's history were reviewed and updated as appropriate: allergies, current medications, past family history, past medical history, past social history, past surgical history, and problem list         OBJECTIVE    PAP Pressure: Nasal pillows - APAP with a lower limit of 7cm and upper limit of 12cm  Pressure changed to 7-14cm  DME Provider: [facility]    Physical Exam:     General Appearance:   Alert, cooperative, no distress, appears stated age, morbidly obese     Head:   Normocephalic, without obvious abnormality, atraumatic     Eyes:   PERRL, conjunctiva/corneas clear, EOM's intact          Nose:  Nares normal, septum midline, no drainage or sinus tenderness           Throat:  Lips, teeth and gums normal; tongue normal size and  shape and midline      Neck:  Supple, symmetrical, trachea midline, no adenopathy; Thyroid: No enlargement, tenderness or nodules; no carotid bruit or JVD     Lungs:      Clear to auscultation bilaterally, respirations unlabored     Heart:   Regular rate and rhythm, S1 and S2 normal, no murmur, rub or gallop       Extremities:  Extremities normal, atraumatic, no cyanosis or edema       Skin:  Skin color, texture, turgor normal, no rashes or lesions       Neurologic:  CNII-XII intact  Normal strength, sensation throughout       ASSESSMENT / PLAN    1   Obstructive sleep apnea treated with continuous positive airway pressure (CPAP)  PAP DME Pressure Change    PAP DME Resupply/Reorder   2  Obesity, Class III, BMI 40-49 9 (morbid obesity) ([facility] [address] )             Counseling / Coordination of Care  Total clinic time spent today 30 minutes  Greater than 50% of total time was spent with the patient and / or family counseling and / or coordination of care  A description of the counseling / coordination of care:     Impressions, Diagnostic results, Prognosis, Instructions for management, Risks and benefits of treatment, Patient and family education, Risk factor reductions and Importance of compliance with treatment    Today I reviewed the patient's compliance data  she has been able to use the equipment 100% of all days recorded  Average usage was 4 or more hours 76 7% of all days recorded  The estimated AHI is 7 1 abnormal breathing events per hour  Response to treatment has been very good  She was encouraged to use her equipment all night  Supplies have been ordered for the next year  We discussed that after she has bariatric surgery and rapidly loses weight, her equipment will adjust to the pressure needed to maintain an open airway during sleep  We discussed signs and symptoms of overtitration  She will call with any concerns  She will continue using this equipment at the settings noted above for the next 12 months  At that timeshe will then return for a routine follow-up evaluation  I have asked the patient to contact the [address] if she encounters any difficulties prior to that time  The following instructions have been given to the patient today:    Patient Instructions   1  Continue use of CPAP equipment nightly  2  Continue to clean your equipment, as discussed  3  Contact the [address] with any questions or concerns prior to your next visit, as needed  4    Schedule visit for follow-up in 1 year    You may call [facility] directly for supplies at  [phone] ext [phone]      Nursing Support:  When: Monday through Friday 7A-5PM except holidays  Where: Our direct line is [phone]  If you are having a true emergency please call 911  In the event that the line is busy or it is after hours please leave a voice message and we will return your call  Please speak clearly, leaving your full name, birth date, best number to reach you and the reason for your call  Medication refills: We will need the name of the medication, the dosage, the ordering provider, whether you get a 30 or 90 day refill, and the pharmacy name and address  Medications will be ordered by the provider only  Nurses cannot call in prescriptions  Please allow 7 days for medication refills  Physician requested updates: If your provider requested that you call with an update after starting medication, please be ready to provide us the medication and dosage, what time you take your medication, the time you attempt to fall asleep, time you fall asleep, when you wake up, and what time you get out of bed  Sleep Study Results: We will contact you with sleep study results and/or next steps after the physician has reviewed your testing        Camelia Edmonds, [address] [place] [address]

## 2019-09-20 ENCOUNTER — TELEPHONE (OUTPATIENT)
Dept: SLEEP CENTER | Facility: CLINIC | Age: 54
End: 2019-09-20

## 2019-09-24 ENCOUNTER — ANESTHESIA EVENT (OUTPATIENT)
Dept: GASTROENTEROLOGY | Facility: HOSPITAL | Age: 54
End: 2019-09-24

## 2019-09-24 ENCOUNTER — APPOINTMENT (OUTPATIENT)
Dept: LAB | Facility: CLINIC | Age: 54
End: 2019-09-24
Payer: COMMERCIAL

## 2019-09-24 ENCOUNTER — TELEPHONE (OUTPATIENT)
Dept: SLEEP CENTER | Facility: CLINIC | Age: 54
End: 2019-09-24

## 2019-09-24 DIAGNOSIS — E78.2 MIXED HYPERLIPIDEMIA: ICD-10-CM

## 2019-09-24 DIAGNOSIS — E11.8 TYPE 2 DIABETES MELLITUS WITH COMPLICATION, WITHOUT LONG-TERM CURRENT USE OF INSULIN (HCC): ICD-10-CM

## 2019-09-24 DIAGNOSIS — I10 ESSENTIAL HYPERTENSION: ICD-10-CM

## 2019-09-24 DIAGNOSIS — G47.33 OBSTRUCTIVE SLEEP APNEA TREATED WITH CONTINUOUS POSITIVE AIRWAY PRESSURE (CPAP): ICD-10-CM

## 2019-09-24 DIAGNOSIS — E66.01 OBESITY, CLASS III, BMI 40-49.9 (MORBID OBESITY) (HCC): ICD-10-CM

## 2019-09-24 DIAGNOSIS — F32.A DEPRESSION, UNSPECIFIED DEPRESSION TYPE: ICD-10-CM

## 2019-09-24 DIAGNOSIS — R63.5 ABNORMAL WEIGHT GAIN: ICD-10-CM

## 2019-09-24 DIAGNOSIS — Z01.812 BLOOD TESTS PRIOR TO TREATMENT OR PROCEDURE: ICD-10-CM

## 2019-09-24 DIAGNOSIS — E66.9 DIABETES MELLITUS TYPE 2 IN OBESE (HCC): ICD-10-CM

## 2019-09-24 DIAGNOSIS — E11.69 DIABETES MELLITUS TYPE 2 IN OBESE (HCC): ICD-10-CM

## 2019-09-24 DIAGNOSIS — E88.81 DYSMETABOLIC SYNDROME X: ICD-10-CM

## 2019-09-24 DIAGNOSIS — Z99.89 OBSTRUCTIVE SLEEP APNEA TREATED WITH CONTINUOUS POSITIVE AIRWAY PRESSURE (CPAP): ICD-10-CM

## 2019-09-24 DIAGNOSIS — I10 HYPERTENSION, UNSPECIFIED TYPE: ICD-10-CM

## 2019-09-24 LAB
ALBUMIN SERPL BCP-MCNC: 3.6 G/DL (ref 3.5–5)
ALP SERPL-CCNC: 78 U/L (ref 46–116)
ALT SERPL W P-5'-P-CCNC: 30 U/L (ref 12–78)
ANION GAP SERPL CALCULATED.3IONS-SCNC: 3 MMOL/L (ref 4–13)
AST SERPL W P-5'-P-CCNC: 12 U/L (ref 5–45)
BILIRUB SERPL-MCNC: 0.45 MG/DL (ref 0.2–1)
BUN SERPL-MCNC: 16 MG/DL (ref 5–25)
CALCIUM SERPL-MCNC: 9.4 MG/DL (ref 8.3–10.1)
CHLORIDE SERPL-SCNC: 102 MMOL/L (ref 100–108)
CO2 SERPL-SCNC: 32 MMOL/L (ref 21–32)
CREAT SERPL-MCNC: 0.8 MG/DL (ref 0.6–1.3)
ERYTHROCYTE [DISTWIDTH] IN BLOOD BY AUTOMATED COUNT: 13.6 % (ref 11.6–15.1)
GFR SERPL CREATININE-BSD FRML MDRD: 86 ML/MIN/1.73SQ M
GLUCOSE P FAST SERPL-MCNC: 167 MG/DL (ref 65–99)
HCT VFR BLD AUTO: 44.4 % (ref 34.8–46.1)
HGB BLD-MCNC: 13.9 G/DL (ref 11.5–15.4)
MAGNESIUM SERPL-MCNC: 2.1 MG/DL (ref 1.6–2.6)
MCH RBC QN AUTO: 27.9 PG (ref 26.8–34.3)
MCHC RBC AUTO-ENTMCNC: 31.3 G/DL (ref 31.4–37.4)
MCV RBC AUTO: 89 FL (ref 82–98)
PLATELET # BLD AUTO: 287 THOUSANDS/UL (ref 149–390)
PMV BLD AUTO: 9.7 FL (ref 8.9–12.7)
POTASSIUM SERPL-SCNC: 3.5 MMOL/L (ref 3.5–5.3)
PROT SERPL-MCNC: 7.2 G/DL (ref 6.4–8.2)
RBC # BLD AUTO: 4.99 MILLION/UL (ref 3.81–5.12)
SODIUM SERPL-SCNC: 137 MMOL/L (ref 136–145)
TSH SERPL DL<=0.05 MIU/L-ACNC: 2.86 UIU/ML (ref 0.36–3.74)
WBC # BLD AUTO: 10.05 THOUSAND/UL (ref 4.31–10.16)

## 2019-09-24 PROCEDURE — 84443 ASSAY THYROID STIM HORMONE: CPT

## 2019-09-24 PROCEDURE — 83735 ASSAY OF MAGNESIUM: CPT

## 2019-09-24 PROCEDURE — 85027 COMPLETE CBC AUTOMATED: CPT

## 2019-09-24 PROCEDURE — 36415 COLL VENOUS BLD VENIPUNCTURE: CPT

## 2019-09-24 PROCEDURE — 80053 COMPREHEN METABOLIC PANEL: CPT

## 2019-09-25 ENCOUNTER — ANESTHESIA (OUTPATIENT)
Dept: GASTROENTEROLOGY | Facility: HOSPITAL | Age: 54
End: 2019-09-25

## 2019-09-25 ENCOUNTER — HOSPITAL ENCOUNTER (OUTPATIENT)
Dept: GASTROENTEROLOGY | Facility: HOSPITAL | Age: 54
Setting detail: OUTPATIENT SURGERY
Discharge: HOME | End: 2019-09-25
Attending: SURGERY
Payer: COMMERCIAL

## 2019-09-25 VITALS
OXYGEN SATURATION: 98 % | TEMPERATURE: 97.3 F | BODY MASS INDEX: 44.79 KG/M2 | WEIGHT: 262.35 LBS | HEART RATE: 49 BPM | HEIGHT: 64 IN | RESPIRATION RATE: 20 BRPM | SYSTOLIC BLOOD PRESSURE: 109 MMHG | DIASTOLIC BLOOD PRESSURE: 55 MMHG

## 2019-09-25 DIAGNOSIS — E66.01 MORBID OBESITY (HCC): ICD-10-CM

## 2019-09-25 LAB
GLUCOSE SERPL-MCNC: 173 MG/DL (ref 65–140)
GLUCOSE SERPL-MCNC: 189 MG/DL (ref 65–140)

## 2019-09-25 PROCEDURE — 43239 EGD BIOPSY SINGLE/MULTIPLE: CPT | Performed by: SURGERY

## 2019-09-25 PROCEDURE — 88305 TISSUE EXAM BY PATHOLOGIST: CPT | Performed by: PATHOLOGY

## 2019-09-25 PROCEDURE — 82948 REAGENT STRIP/BLOOD GLUCOSE: CPT

## 2019-09-25 RX ORDER — SODIUM CHLORIDE 9 MG/ML
125 INJECTION, SOLUTION INTRAVENOUS CONTINUOUS
Status: DISCONTINUED | OUTPATIENT
Start: 2019-09-25 | End: 2019-09-29 | Stop reason: HOSPADM

## 2019-09-25 RX ORDER — PROPOFOL 10 MG/ML
INJECTION, EMULSION INTRAVENOUS AS NEEDED
Status: DISCONTINUED | OUTPATIENT
Start: 2019-09-25 | End: 2019-09-25 | Stop reason: SURG

## 2019-09-25 RX ADMIN — SODIUM CHLORIDE 125 ML/HR: 0.9 INJECTION, SOLUTION INTRAVENOUS at 09:39

## 2019-09-25 RX ADMIN — LIDOCAINE HYDROCHLORIDE 100 MG: 20 INJECTION, SOLUTION INTRAVENOUS at 10:34

## 2019-09-25 RX ADMIN — PROPOFOL 200 MG: 10 INJECTION, EMULSION INTRAVENOUS at 10:34

## 2019-09-25 NOTE — H&P
This is a 48 y o  female with a history of morbid obesity and Body mass index is 45 03 kg/m²  Here for an EGD to evaluate the anatomy of the GI tract  Physical Exam    Pulse 55   Temp (!) 97 3 °F (36 3 °C) (Temporal)   Resp 18   Ht 5' 4" (1 626 m)   Wt 119 kg (262 lb 5 6 oz)   SpO2 97%   BMI 45 03 kg/m²    AAOx3  RRR  CTA B  Abdomen obese  Benign  A/P:    This is a 48 y o  female with a history of morbid obesity and Body mass index is 45 03 kg/m²       Will proceed with the EGD and biopsies        Chana Nguyen MD  09/25/19  10:22 AM

## 2019-09-25 NOTE — DISCHARGE INSTRUCTIONS
Gastritis   WHAT YOU NEED TO KNOW:   Gastritis is inflammation or irritation of the lining of your stomach  DISCHARGE INSTRUCTIONS:   Call 911 for any of the following:   · You develop chest pain or shortness of breath  Seek care immediately if:   · You vomit blood  · You have black or bloody bowel movements  · You have severe stomach or back pain  Contact your healthcare provider if:   · You have a fever  · You have new or worsening symptoms, even after treatment  · You have questions or concerns about your condition or care  Medicines:   · Medicines  may be given to help treat a bacterial infection or decrease stomach acid  · Take your medicine as directed  Contact your healthcare provider if you think your medicine is not helping or if you have side effects  Tell him or her if you are allergic to any medicine  Keep a list of the medicines, vitamins, and herbs you take  Include the amounts, and when and why you take them  Bring the list or the pill bottles to follow-up visits  Carry your medicine list with you in case of an emergency  Manage or prevent gastritis:   · Do not smoke  Nicotine and other chemicals in cigarettes and cigars can make your symptoms worse and cause lung damage  Ask your healthcare provider for information if you currently smoke and need help to quit  E-cigarettes or smokeless tobacco still contain nicotine  Talk to your healthcare provider before you use these products  · Do not drink alcohol  Alcohol can prevent healing and make your gastritis worse  Talk to your healthcare provider if you need help to stop drinking  · Do not take NSAIDs or aspirin unless directed  These and similar medicines can cause irritation  If your healthcare provider says it is okay to take NSAIDs, take them with food  · Do not eat foods that cause irritation  Foods such as oranges and salsa can cause burning or pain  Eat a variety of healthy foods  Examples include fruits (not citrus), vegetables, low-fat dairy products, beans, whole-grain breads, and lean meats and fish  Try to eat small meals, and drink water with your meals  Do not eat for at least 3 hours before you go to bed  · Find ways to relax and decrease stress  Stress can increase stomach acid and make gastritis worse  Activities such as yoga, meditation, or listening to music can help you relax  Spend time with friends, or do things you enjoy  Follow up with your healthcare provider as directed: You may need ongoing tests or treatment, or referral to a gastroenterologist  Write down your questions so you remember to ask them during your visits  © 2017 [employer]  Information is for End User's use only and may not be sold, redistributed or otherwise used for commercial purposes  All illustrations and images included in CareNotes® are the copyrighted property of [employer]  or [employer]  The above information is an  only  It is not intended as medical advice for individual conditions or treatments  Talk to your doctor, nurse or pharmacist before following any medical regimen to see if it is safe and effective for you  Gastric Polyps   WHAT YOU NEED TO KNOW:   Gastric polyps are growths that form in the lining of your stomach  They are not cancerous, but certain types of polyps can change into cancer  DISCHARGE INSTRUCTIONS:   Follow up with your healthcare provider as directed: You may need more tests or procedures  Write down any questions so you remember to ask them at your visits  Medicines:   · Medicines may  be given if you have an infection caused by H  pylori bacteria  · Take your medicine as directed  Contact your healthcare provider if you think your medicine is not helping or if you have side effects  Tell him or her if you are allergic to any medicine  Keep a list of the medicines, vitamins, and herbs you take   Include the amounts, and when and why you take them  Bring the list or the pill bottles to follow-up visits  Carry your medicine list with you in case of an emergency  Seek care immediately if:   · You have blood in your vomit  · You have dark bowel movements  · You have severe pain in your abdomen that does not go away after you take medicine  Contact your healthcare provider if:   · You have indigestion that does not go away with treatment  · You vomit after meals  · You have questions or concerns about your condition or care  © 2017 [address] [facility] Information is for End User's use only and may not be sold, redistributed or otherwise used for commercial purposes  All illustrations and images included in CareNotes® are the copyrighted property of [employer]  or [employer] Woodward  The above information is an  only  It is not intended as medical advice for individual conditions or treatments  Talk to your doctor, nurse or pharmacist before following any medical regimen to see if it is safe and effective for you  Esophagitis   WHAT YOU NEED TO KNOW:   Esophagitis is inflammation or irritation of the lining of the esophagus  DISCHARGE INSTRUCTIONS:   Call 911 for any of the following:   · You have chest pain that does not go away within a few minutes or gets worse  Seek care immediately if:   · You feel like you have food stuck in your throat and you cannot cough it out  Contact your healthcare provider if:   · You have new or worsening symptoms, even after treatment  · You have questions or concerns about your condition or care  Medicines:   · Medicines  may be given to fight an infection or to control stomach acid  · Take your medicine as directed  Contact your healthcare provider if you think your medicine is not helping or if you have side effects  Tell him or her if you are allergic to any medicine   Keep a list of the medicines, vitamins, and herbs you take  Include the amounts, and when and why you take them  Bring the list or the pill bottles to follow-up visits  Carry your medicine list with you in case of an emergency  Follow up with your healthcare provider as directed: You may need ongoing tests or treatment  Write down your questions so you remember to ask them during your visits  Do not smoke:  Nicotine and other chemicals in cigarettes and cigars can cause blood vessel and lung damage  Ask your healthcare provider for information if you currently smoke and need help to quit  E-cigarettes or smokeless tobacco still contain nicotine  Talk to your healthcare provider before you use these products  Do not drink alcohol:  Alcohol can irritate your esophagus  Talk to your healthcare provider if you need help to stop drinking  Keep batteries and similar objects out of the reach of children:  Babies often put items in their mouths to explore them  Button batteries are easy to swallow and can cause serious damage  Keep the battery covers of electronic devices such as remote controls taped closed  Store all batteries and toxic materials where children cannot get to them  Use childproof locks to keep children away from dangerous materials  Manage or prevent esophagitis:   · Limit or do not eat foods that can lead to esophagitis  Foods such as oranges and salsa can irritate your esophagus  Caffeine and chocolate can cause acid reflux  High-fat and fried foods make your stomach digest food more slowly  This increases the amount of stomach acid your esophagus is exposed to  Eat small meals, and drink water with your meals  Soft foods such as yogurt and applesauce may help soothe your throat  Do not eat for at least 3 hours before you go to bed  · Prevent acid reflux  Do not bend over unless it is necessary  Acid may back up into your esophagus when you bend over  If possible, keep the head of your bed elevated while you sleep   This will help keep acid from backing up  Manage stress  Stress can make your symptoms worse or cause stomach acid to back up  · Drink more liquid when you take pills  Drink a full glass of water when you take your pills  Ask your healthcare provider if you can take your pills at least an hour before you go to bed  © 2017 [address] Information is for End User's use only and may not be sold, redistributed or otherwise used for commercial purposes  All illustrations and images included in CareNotes® are the copyrighted property of [employer]  or [employer]lo Woodward  The above information is an  only  It is not intended as medical advice for individual conditions or treatments  Talk to your doctor, nurse or pharmacist before following any medical regimen to see if it is safe and effective for you  Upper Endoscopy   WHAT YOU NEED TO KNOW:   An upper endoscopy is also called an upper gastrointestinal (GI) endoscopy, or an esophagogastroduodenoscopy (EGD)  You may feel bloated, gassy, or have some abdominal discomfort after your procedure  Your throat may be sore for 24 to 36 hours  You may burp or pass gas from air that is still inside your body  DISCHARGE INSTRUCTIONS:   Call 911 for any of the following:   · You have sudden chest pain or trouble breathing  Seek care immediately if:   · You feel dizzy or faint  · You have trouble swallowing  · Your bowel movements are very dark or black  · Your abdomen is hard and firm and you have severe pain  · You vomit blood  Contact your healthcare provider if:   · You feel full or bloated and cannot burp or pass gas  · You have not had a bowel movement for 3 days after your procedure  · You have neck pain  · You have a fever or chills  · You have nausea or are vomiting  · You have a rash or hives  · You have questions or concerns about your endoscopy  Relieve a sore throat:  Suck on throat lozenges or crushed ice  Gargle with a small amount of warm salt water  Mix 1 teaspoon of salt and 1 cup of warm water to make salt water  Relieve gas and discomfort from bloating:  Lie on your right side with a heating pad on your abdomen  Take short walks to help pass gas  Eat small meals until bloating is relieved  Rest after your procedure: You have been given medicine to relax you  Do not  drive or make important decisions until the day after your procedure  Return to your normal activity as directed  You can usually return to work the day after your procedure  Follow up with your healthcare provider as directed:  Write down your questions so you remember to ask them during your visits  © 2017 [address] [employer] is for End User's use only and may not be sold, redistributed or otherwise used for commercial purposes  All illustrations and images included in CareNotes® are the copyrighted property of [employer]  or Karlo Woodward  The above information is an  only  It is not intended as medical advice for individual conditions or treatments  Talk to your doctor, nurse or pharmacist before following any medical regimen to see if it is safe and effective for you

## 2019-09-25 NOTE — ANESTHESIA PREPROCEDURE EVALUATION
Review of Systems/Medical History  Patient summary reviewed  Chart reviewed  No history of anesthetic complications     Cardiovascular  Hyperlipidemia, Hypertension controlled,   PE,  Pulmonary  Asthma , well controlled/ stable Last rescue: < 6 months ago Asthma type of rescue: PRN inhaler, Sleep apnea ,        GI/Hepatic  Negative GI/hepatic ROS               Endo/Other  Diabetes type 2 Oral agent,   Comment: dysmetabolc syndrome X Obesity (45=BMI)  morbid obesity   GYN  Negative gynecology ROS          Hematology  Negative hematology ROS      Musculoskeletal    Arthritis     Neurology   Psychology   Anxiety, Depression , bipolar disorder and being treated for depression,              Physical Exam    Airway    Mallampati score: II  TM Distance: >3 FB  Neck ROM: full     Dental   No notable dental hx     Cardiovascular  Rhythm: regular, Rate: normal, Cardiovascular exam normal    Pulmonary  Pulmonary exam normal Breath sounds clear to auscultation,     Other Findings        Anesthesia Plan  ASA Score- 3     Anesthesia Type- IV sedation with anesthesia with ASA Monitors  Additional Monitors:   Airway Plan:         Plan Factors-Patient not instructed to abstain from smoking on day of procedure  Patient did not smoke on day of surgery  Induction- intravenous  Postoperative Plan-     Informed Consent- Anesthetic plan and risks discussed with patient  I personally reviewed this patient with the CRNA  Discussed and agreed on the Anesthesia Plan with the CRNA  Mara Tyler

## 2019-09-26 NOTE — ANESTHESIA POSTPROCEDURE EVALUATION
Post-Op Assessment Note    CV Status:  Stable    Pain management: adequate     Mental Status:  Alert and awake   Hydration Status:  Euvolemic   PONV Controlled:  Controlled   Airway Patency:  Patent   Post Op Vitals Reviewed: Yes      Staff: Anesthesiologist, CRNA           BP      Temp     Pulse    Resp      SpO2

## 2019-09-27 PROBLEM — E11.8 TYPE 2 DIABETES MELLITUS WITH COMPLICATION (HCC): Status: ACTIVE | Noted: 2019-09-27

## 2019-09-27 PROBLEM — E66.01 MORBID OBESITY (HCC): Status: ACTIVE | Noted: 2019-09-27

## 2019-09-27 PROBLEM — G47.30 SLEEP APNEA: Status: ACTIVE | Noted: 2019-09-27

## 2019-10-09 DIAGNOSIS — E66.01 MORBID (SEVERE) OBESITY DUE TO EXCESS CALORIES (HCC): Primary | ICD-10-CM

## 2019-10-09 NOTE — PRE-PROCEDURE INSTRUCTIONS
Pre-Surgery Instructions:   Medication Instructions    albuterol (PROVENTIL HFA,VENTOLIN HFA) 90 mcg/act inhaler Instructed patient per Anesthesia Guidelines   ALPRAZolam (XANAX) 0 25 mg tablet Instructed patient per Anesthesia Guidelines   amLODIPine (NORVASC) 10 mg tablet Instructed patient per Anesthesia Guidelines   atorvastatin (LIPITOR) 40 mg tablet Instructed patient per Anesthesia Guidelines   bisoprolol (ZEBETA) 10 MG tablet Instructed patient per Anesthesia Guidelines   diclofenac (VOLTAREN) 75 mg EC tablet Instructed patient per Anesthesia Guidelines   EDARBYCLOR 40-25 MG TABS Instructed patient per Anesthesia Guidelines   lamoTRIgine (LaMICtal) 200 MG tablet Instructed patient per Anesthesia Guidelines   TRAMADOL HCL PO Instructed patient per Anesthesia Guidelines   venlafaxine (EFFEXOR-XR) 75 mg 24 hr capsule Instructed patient per Anesthesia Guidelines  Instructed patient to take Amlodipine, Bisoprolol, Lamictal, and Effexor and may take Tramadol if needed with sips of water the morning of surgery per anesthesia guidelines  No aspirin, NSAIDs, vitamins, or supplements 1 week before surgery

## 2019-10-10 RX ORDER — OXYCODONE HYDROCHLORIDE 5 MG/1
5 TABLET ORAL EVERY 4 HOURS PRN
Qty: 15 TABLET | Refills: 0 | Status: SHIPPED | OUTPATIENT
Start: 2019-10-10 | End: 2019-10-22 | Stop reason: HOSPADM

## 2019-10-10 RX ORDER — OMEPRAZOLE 20 MG/1
20 CAPSULE, DELAYED RELEASE ORAL DAILY
Qty: 90 CAPSULE | Refills: 1 | Status: SHIPPED | OUTPATIENT
Start: 2019-10-10 | End: 2020-01-31

## 2019-10-11 ENCOUNTER — OFFICE VISIT (OUTPATIENT)
Dept: BARIATRICS | Facility: CLINIC | Age: 54
End: 2019-10-11
Payer: COMMERCIAL

## 2019-10-11 VITALS
DIASTOLIC BLOOD PRESSURE: 78 MMHG | TEMPERATURE: 98.3 F | HEART RATE: 64 BPM | WEIGHT: 252 LBS | HEIGHT: 64 IN | SYSTOLIC BLOOD PRESSURE: 114 MMHG | BODY MASS INDEX: 43.02 KG/M2

## 2019-10-11 DIAGNOSIS — M25.562 CHRONIC PAIN OF BOTH KNEES: ICD-10-CM

## 2019-10-11 DIAGNOSIS — E66.01 OBESITY, CLASS III, BMI 40-49.9 (MORBID OBESITY) (HCC): Primary | ICD-10-CM

## 2019-10-11 DIAGNOSIS — G89.29 CHRONIC PAIN OF BOTH KNEES: ICD-10-CM

## 2019-10-11 DIAGNOSIS — I10 ESSENTIAL HYPERTENSION: ICD-10-CM

## 2019-10-11 DIAGNOSIS — E11.8 TYPE 2 DIABETES MELLITUS WITH COMPLICATION (HCC): ICD-10-CM

## 2019-10-11 DIAGNOSIS — E78.2 MIXED HYPERLIPIDEMIA: ICD-10-CM

## 2019-10-11 DIAGNOSIS — M25.561 CHRONIC PAIN OF BOTH KNEES: ICD-10-CM

## 2019-10-11 DIAGNOSIS — Z99.89 OBSTRUCTIVE SLEEP APNEA TREATED WITH CONTINUOUS POSITIVE AIRWAY PRESSURE (CPAP): ICD-10-CM

## 2019-10-11 DIAGNOSIS — F32.A DEPRESSION, UNSPECIFIED DEPRESSION TYPE: ICD-10-CM

## 2019-10-11 DIAGNOSIS — G47.33 OBSTRUCTIVE SLEEP APNEA TREATED WITH CONTINUOUS POSITIVE AIRWAY PRESSURE (CPAP): ICD-10-CM

## 2019-10-11 PROCEDURE — 99213 OFFICE O/P EST LOW 20 MIN: CPT | Performed by: SURGERY

## 2019-10-11 PROCEDURE — 3078F DIAST BP <80 MM HG: CPT | Performed by: SURGERY

## 2019-10-11 PROCEDURE — 3074F SYST BP LT 130 MM HG: CPT | Performed by: SURGERY

## 2019-10-11 RX ORDER — ACETAMINOPHEN 325 MG/1
975 TABLET ORAL ONCE
Status: CANCELLED | OUTPATIENT
Start: 2019-10-21 | End: 2019-10-11

## 2019-10-11 RX ORDER — GABAPENTIN 300 MG/1
600 CAPSULE ORAL ONCE
Status: CANCELLED | OUTPATIENT
Start: 2019-10-21 | End: 2019-10-11

## 2019-10-11 RX ORDER — HEPARIN SODIUM 5000 [USP'U]/ML
5000 INJECTION, SOLUTION INTRAVENOUS; SUBCUTANEOUS
Status: CANCELLED | OUTPATIENT
Start: 2019-10-21 | End: 2019-10-22

## 2019-10-11 RX ORDER — LEVOFLOXACIN 5 MG/ML
750 INJECTION, SOLUTION INTRAVENOUS ONCE
Status: CANCELLED | OUTPATIENT
Start: 2019-10-21 | End: 2019-10-11

## 2019-10-11 RX ORDER — SCOLOPAMINE TRANSDERMAL SYSTEM 1 MG/1
1 PATCH, EXTENDED RELEASE TRANSDERMAL ONCE
Status: CANCELLED | OUTPATIENT
Start: 2019-10-21 | End: 2019-10-11

## 2019-10-11 NOTE — H&P
BARIATRIC H&P - BARIATRIC SURGERY  Herminia Lees 48 y o  female MRN: [id number]  Unit/Bed#:  Encounter: [id number]      HPI:  Herminia Lees is a 48 y o  female who presents with a long-standing history of morbid obesity  She was found to be a good candidate to undergo a bariatric operation upon being enrolled [facility] the [facility]  She is here today to discuss details of her surgery  Review of Systems   All other systems reviewed and are negative        Historical Information   Past Medical History:   Diagnosis Date    Acute bronchitis     Anxiety     Arthritis     Asthma     Continuous positive airway pressure dependence     CPAP (continuous positive airway pressure) dependence     Depression     Diabetes mellitus     GERD (gastroesophageal reflux disease)     Hyperlipidemia     Hypertension     Morbid obesity     Sleep apnea     USES C PAP     Past Surgical History:   Procedure Laterality Date    BREAST SURGERY      bilat lumpectomy     SECTION      twice    CHOLECYSTECTOMY      EGD      HYSTERECTOMY      KNEE ARTHROSCOPY Bilateral     WISDOM TOOTH EXTRACTION       Social History   Social History     Substance and Sexual Activity   Alcohol Use Not Currently    Frequency: Monthly or less    Comment: occasional     Social History     Substance and Sexual Activity   Drug Use No     Social History     Tobacco Use   Smoking Status Never Smoker   Smokeless Tobacco Never Used     Family History: non-contributory    Meds/Allergies   all medications and allergies reviewed  Allergies   Allergen Reactions    Amoxicillin Hives    Amoxicillin-Pot Clavulanate Hives    Oxycodone-Acetaminophen Vomiting    Sulfa Antibiotics Hives    Erythromycin GI Intolerance    Wound Dressings Rash     Plastic adhesive tape- okay with paper tape       Objective     Current Vitals:   Blood Pressure: 114/78 (10/11/19 1445)  Pulse: 64 (10/11/19 1445)  Temperature: 98 3 °F (36 8 °C) (10/11/19 1445)  Temp Source: Tympanic (10/11/19 144)  Height: 5' 4" (162 6 cm) (10/11/19 1445)  Weight - Scale: 114 kg (252 lb) (10/11/19 1445)      Invasive Devices     None                 Physical Exam   Constitutional: She is oriented to person, place, and time  Vital signs are normal  She appears well-developed and well-nourished  No distress  HENT:   Head: Normocephalic and atraumatic  Nose: Nose normal    Mouth/Throat: Oropharynx is clear and moist    Eyes: Conjunctivae are normal  Right eye exhibits no discharge  Left eye exhibits no discharge  No scleral icterus  Neck: Normal range of motion  Neck supple  Cardiovascular: Normal rate, regular rhythm, normal heart sounds and intact distal pulses  Pulmonary/Chest: Effort normal and breath sounds normal  No stridor  No respiratory distress  She has no wheezes  She has no rales  She exhibits no tenderness  Abdominal: Soft  Normal appearance and bowel sounds are normal  There is no tenderness  There is no rebound, no guarding and no CVA tenderness  Abdomen is obese, benign and soft  Well-healed Pfannenstiel incision from previous    Musculoskeletal: Normal range of motion  She exhibits no deformity  Lymphadenopathy:     She has no cervical adenopathy  Neurological: She is alert and oriented to person, place, and time  Skin: Skin is warm and dry  No rash noted  She is not diaphoretic  No erythema  Psychiatric: She has a normal mood and affect  Her behavior is normal  Judgment and thought content normal    Nursing note and vitals reviewed  Lab Results: I have personally reviewed pertinent lab results  Imaging: I have personally reviewed pertinent reports  EKG, Pathology, and Other Studies: I have personally reviewed pertinent reports  The endoscopy showed LA grade a esophagitis, gastritis, moderate amount of bile refluxing into the body of the stomach and multiple gastric polyps  The biopsies     A   Gastric biopsy:  - Mild chronic inactive antral & oxyntic gastritis, negative for curvilinear Helicobacter pylori organisms by routine H&E stains   - No atrophy or intestinal metaplasia identified   - No epithelial dysplasia and no evidence of malignancy      B  Gastric polyp:  - Benign fundic gland polyp   - No epithelial dysplasia and no evidence of malignancy  Assessment/PLAN:    48 y o  female morbidly obese found to be a good candidate to undergo a weight loss operation upon being enrolled here at the [facility]     Patient has a long history of morbid obesity and is presenting to discuss the surgical weight loss options  Despite the patient best efforts patient was unable to lose any meaningful or sustainable weight using nonsurgical means  We had a long discussion regarding all the surgical weight-loss options at our disposal at this point and reviewed the risks and benefits of each procedure in details as it relates to her age, BMI and medical conditions  She has been pre certified to undergo a Laparoscopic Liset-en-Y gastric bypass possible sleeve gastrectomy  Here today to review her pre op test results  Has been medically cleared for the procedure   +++++++++++++++++++++++++++++  She has ADAM and wears a CPAP machine  +++++++++++++++++++++++++++++    I have discussed with her at length the risks and benefits of the operation and reiterated the components of our multidisciplinary program and the importance of compliance and follow up in the post operative period  Although there is a great statistical chance of improvement or even resolution of most of her associated comorbidities, the results vary from patient to patient and they largely depend on her commitment  The patient was also instructed with regards to the importance of behavior modification, nutritional counseling, support meeting attendance and lifestyle changes that are important to ensure success      She was given the opportunity to ask questions and I have answered all of them  I have addressed with the patient the level of CODE STATUS for this hospital stay and after explaining the different options currently she wishes to be a Level I  She understands and wishes to proceed      She is still needs to lose 5 more lb prior to the surgery    Rosa Kasper MD  10/11/2019  2:49 PM

## 2019-10-11 NOTE — H&P (VIEW-ONLY)
BARIATRIC H&P - BARIATRIC SURGERY  Júnior Roberson 48 y o  female MRN: [id number]  Unit/Bed#:  Encounter: [id number]      HPI:  Júnior Roberson is a 48 y o  female who presents with a long-standing history of morbid obesity  She was found to be a good candidate to undergo a bariatric operation upon being enrolled here at [facility]  She is here today to discuss details of her surgery  Review of Systems   All other systems reviewed and are negative        Historical Information   Past Medical History:   Diagnosis Date    Acute bronchitis     Anxiety     Arthritis     Asthma     Continuous positive airway pressure dependence     CPAP (continuous positive airway pressure) dependence     Depression     Diabetes mellitus     GERD (gastroesophageal reflux disease)     Hyperlipidemia     Hypertension     Morbid obesity     Sleep apnea     USES C PAP     Past Surgical History:   Procedure Laterality Date    BREAST SURGERY      bilat lumpectomy     SECTION      twice    CHOLECYSTECTOMY      EGD      HYSTERECTOMY      KNEE ARTHROSCOPY Bilateral     WISDOM TOOTH EXTRACTION       Social History   Social History     Substance and Sexual Activity   Alcohol Use Not Currently    Frequency: Monthly or less    Comment: occasional     Social History     Substance and Sexual Activity   Drug Use No     Social History     Tobacco Use   Smoking Status Never Smoker   Smokeless Tobacco Never Used     Family History: non-contributory    Meds/Allergies   all medications and allergies reviewed  Allergies   Allergen Reactions    Amoxicillin Hives    Amoxicillin-Pot Clavulanate Hives    Oxycodone-Acetaminophen Vomiting    Sulfa Antibiotics Hives    Erythromycin GI Intolerance    Wound Dressings Rash     Plastic adhesive tape- okay with paper tape       Objective     Current Vitals:   Blood Pressure: 114/78 (10/11/19 1445)  Pulse: 64 (10/11/19 1445)  Temperature: 98 3 °F (36 8 °C) (10/11/19 1445)  Temp Source: Tympanic (10/11/19 144)  Height: 5' 4" (162 6 cm) (10/11/19 1445)  Weight - Scale: 114 kg (252 lb) (10/11/19 1445)      Invasive Devices     None                 Physical Exam   Constitutional: She is oriented to person, place, and time  Vital signs are normal  She appears well-developed and well-nourished  No distress  HENT:   Head: Normocephalic and atraumatic  Nose: Nose normal    Mouth/Throat: Oropharynx is clear and moist    Eyes: Conjunctivae are normal  Right eye exhibits no discharge  Left eye exhibits no discharge  No scleral icterus  Neck: Normal range of motion  Neck supple  Cardiovascular: Normal rate, regular rhythm, normal heart sounds and intact distal pulses  Pulmonary/Chest: Effort normal and breath sounds normal  No stridor  No respiratory distress  She has no wheezes  She has no rales  She exhibits no tenderness  Abdominal: Soft  Normal appearance and bowel sounds are normal  There is no tenderness  There is no rebound, no guarding and no CVA tenderness  Abdomen is obese, benign and soft  Well-healed Pfannenstiel incision from previous    Musculoskeletal: Normal range of motion  She exhibits no deformity  Lymphadenopathy:     She has no cervical adenopathy  Neurological: She is alert and oriented to person, place, and time  Skin: Skin is warm and dry  No rash noted  She is not diaphoretic  No erythema  Psychiatric: She has a normal mood and affect  Her behavior is normal  Judgment and thought content normal    Nursing note and vitals reviewed  Lab Results: I have personally reviewed pertinent lab results  Imaging: I have personally reviewed pertinent reports  EKG, Pathology, and Other Studies: I have personally reviewed pertinent reports  The endoscopy showed LA grade a esophagitis, gastritis, moderate amount of bile refluxing into the body of the stomach and multiple gastric polyps  The biopsies     A   Gastric biopsy:  - Mild chronic inactive antral & oxyntic gastritis, negative for curvilinear Helicobacter pylori organisms by routine H&E stains   - No atrophy or intestinal metaplasia identified   - No epithelial dysplasia and no evidence of malignancy      B  Gastric polyp:  - Benign fundic gland polyp   - No epithelial dysplasia and no evidence of malignancy  Assessment/PLAN:    48 y o  female morbidly obese found to be a good candidate to undergo a weight loss operation upon being enrolled here at the [facility]     Patient has a long history of morbid obesity and is presenting to discuss the surgical weight loss options  Despite the patient best efforts patient was unable to lose any meaningful or sustainable weight using nonsurgical means  We had a long discussion regarding all the surgical weight-loss options at our disposal at this point and reviewed the risks and benefits of each procedure in details as it relates to her age, BMI and medical conditions  She has been pre certified to undergo a Laparoscopic Liset-en-Y gastric bypass possible sleeve gastrectomy  Here today to review her pre op test results  Has been medically cleared for the procedure   +++++++++++++++++++++++++++++  She has ADAM and wears a CPAP machine  +++++++++++++++++++++++++++++    I have discussed with her at length the risks and benefits of the operation and reiterated the components of our multidisciplinary program and the importance of compliance and follow up in the post operative period  Although there is a great statistical chance of improvement or even resolution of most of her associated comorbidities, the results vary from patient to patient and they largely depend on her commitment  The patient was also instructed with regards to the importance of behavior modification, nutritional counseling, support meeting attendance and lifestyle changes that are important to ensure success      She was given the opportunity to ask questions and I have answered all of them  I have addressed with the patient the level of CODE STATUS for this hospital stay and after explaining the different options currently she wishes to be a Level I  She understands and wishes to proceed      She is still needs to lose 5 more lb prior to the surgery    Rose Clark MD  10/11/2019  2:49 PM

## 2019-10-15 ENCOUNTER — TELEPHONE (OUTPATIENT)
Dept: BARIATRICS | Facility: CLINIC | Age: 54
End: 2019-10-15

## 2019-10-15 NOTE — TELEPHONE ENCOUNTER
Pre-op call was made to patient to follow up on how they are doing and to remind them to continue with all medical and dietary directions that were given at pre-op class  They were encouraged to purchase all vitamins and protein shakes for post op use as well as to begin Miralax three days prior to surgery as directed in Section 6 of their manual   They were reminded of the Ensure Pre-surgery drinks protocol and to bring their completed yellow form with them to surgery as well as their CPAP-BiPAP machine if they use one  Lastly, they were informed that they would be weighed the morning of surgery and to give the office a call if they had any further questions or concerns

## 2019-10-18 ENCOUNTER — TELEPHONE (OUTPATIENT)
Dept: BARIATRICS | Facility: CLINIC | Age: 54
End: 2019-10-18

## 2019-10-18 NOTE — TELEPHONE ENCOUNTER
I received a call from [facility] who is scheduled for surgery next week  She was seen for her H&P on 10/11/2019  She started the medical program weighing 270  She did lose some weight but has lost on the liquid diet and got down to 252 at her visit last week  She had a syncopal episode last week where her blood pressure dropped very low so her PCP took her off of all her hypertension medications, one being a diuretic  Since being taken off the diuretic, she has noticed fluid retention which has caused her weight to go up  Here PCP does not want her to take her diuretic because her bp remains low  Needless to say she was hysterical because of the weight gain  She was sobbing on the phone  I told her that I would make the surgeon aware of the situation and to stay off the scale and to continue the liquid diet as she has been doing

## 2019-10-20 ENCOUNTER — ANESTHESIA EVENT (OUTPATIENT)
Dept: PERIOP | Facility: HOSPITAL | Age: 54
DRG: 621 | End: 2019-10-20
Payer: COMMERCIAL

## 2019-10-21 ENCOUNTER — ANESTHESIA (OUTPATIENT)
Dept: PERIOP | Facility: HOSPITAL | Age: 54
DRG: 621 | End: 2019-10-21
Payer: COMMERCIAL

## 2019-10-21 ENCOUNTER — HOSPITAL ENCOUNTER (INPATIENT)
Facility: HOSPITAL | Age: 54
LOS: 1 days | Discharge: HOME | DRG: 621 | End: 2019-10-22
Attending: SURGERY | Admitting: SURGERY
Payer: COMMERCIAL

## 2019-10-21 DIAGNOSIS — E11.8 TYPE 2 DIABETES MELLITUS WITH COMPLICATION, UNSPECIFIED WHETHER LONG TERM INSULIN USE: ICD-10-CM

## 2019-10-21 DIAGNOSIS — Z98.84 BARIATRIC SURGERY STATUS: ICD-10-CM

## 2019-10-21 DIAGNOSIS — F32.A DEPRESSION, UNSPECIFIED DEPRESSION TYPE: ICD-10-CM

## 2019-10-21 DIAGNOSIS — G47.30 SLEEP APNEA, UNSPECIFIED TYPE: ICD-10-CM

## 2019-10-21 DIAGNOSIS — E11.8 TYPE 2 DIABETES MELLITUS WITH COMPLICATION (HCC): ICD-10-CM

## 2019-10-21 DIAGNOSIS — I10 ESSENTIAL HYPERTENSION: Primary | ICD-10-CM

## 2019-10-21 DIAGNOSIS — E66.01 MORBID OBESITY (HCC): ICD-10-CM

## 2019-10-21 LAB — GLUCOSE SERPL-MCNC: 153 MG/DL (ref 65–140)

## 2019-10-21 PROCEDURE — 94760 N-INVAS EAR/PLS OXIMETRY 1: CPT

## 2019-10-21 PROCEDURE — 43775 LAP SLEEVE GASTRECTOMY: CPT | Performed by: SURGERY

## 2019-10-21 PROCEDURE — NC001 PR NO CHARGE: Performed by: NURSE PRACTITIONER

## 2019-10-21 PROCEDURE — C9290 INJ, BUPIVACAINE LIPOSOME: HCPCS | Performed by: SURGERY

## 2019-10-21 PROCEDURE — 82948 REAGENT STRIP/BLOOD GLUCOSE: CPT

## 2019-10-21 PROCEDURE — 90682 RIV4 VACC RECOMBINANT DNA IM: CPT | Performed by: SURGERY

## 2019-10-21 PROCEDURE — 88307 TISSUE EXAM BY PATHOLOGIST: CPT | Performed by: PATHOLOGY

## 2019-10-21 PROCEDURE — 0DB64Z3 EXCISION OF STOMACH, PERCUTANEOUS ENDOSCOPIC APPROACH, VERTICAL: ICD-10-PCS | Performed by: SURGERY

## 2019-10-21 PROCEDURE — 94640 AIRWAY INHALATION TREATMENT: CPT

## 2019-10-21 PROCEDURE — 0DJ08ZZ INSPECTION OF UPPER INTESTINAL TRACT, VIA NATURAL OR ARTIFICIAL OPENING ENDOSCOPIC: ICD-10-PCS | Performed by: SURGERY

## 2019-10-21 DEVICE — SEAMGUARD STPL REINF ENDO GIA ULTRA UNIV 60 PURPLE: Type: IMPLANTABLE DEVICE | Site: STOMACH | Status: FUNCTIONAL

## 2019-10-21 DEVICE — SEAMGUARD STPL REINF ENDO GIA ULTRA UNV 60 BLACK: Type: IMPLANTABLE DEVICE | Site: STOMACH | Status: FUNCTIONAL

## 2019-10-21 RX ORDER — NEOSTIGMINE METHYLSULFATE 1 MG/ML
INJECTION INTRAVENOUS AS NEEDED
Status: DISCONTINUED | OUTPATIENT
Start: 2019-10-21 | End: 2019-10-21 | Stop reason: SURG

## 2019-10-21 RX ORDER — SCOLOPAMINE TRANSDERMAL SYSTEM 1 MG/1
1 PATCH, EXTENDED RELEASE TRANSDERMAL ONCE
Status: DISCONTINUED | OUTPATIENT
Start: 2019-10-21 | End: 2019-10-21

## 2019-10-21 RX ORDER — MIDAZOLAM HYDROCHLORIDE 1 MG/ML
INJECTION INTRAMUSCULAR; INTRAVENOUS AS NEEDED
Status: DISCONTINUED | OUTPATIENT
Start: 2019-10-21 | End: 2019-10-21 | Stop reason: SURG

## 2019-10-21 RX ORDER — KETOROLAC TROMETHAMINE 30 MG/ML
15 INJECTION, SOLUTION INTRAMUSCULAR; INTRAVENOUS EVERY 6 HOURS SCHEDULED
Status: COMPLETED | OUTPATIENT
Start: 2019-10-21 | End: 2019-10-22

## 2019-10-21 RX ORDER — PROMETHAZINE HYDROCHLORIDE 25 MG/ML
25 INJECTION, SOLUTION INTRAMUSCULAR; INTRAVENOUS EVERY 4 HOURS PRN
Status: DISCONTINUED | OUTPATIENT
Start: 2019-10-21 | End: 2019-10-22 | Stop reason: HOSPADM

## 2019-10-21 RX ORDER — ACETAMINOPHEN 325 MG/1
975 TABLET ORAL ONCE
Status: COMPLETED | OUTPATIENT
Start: 2019-10-21 | End: 2019-10-21

## 2019-10-21 RX ORDER — FENTANYL CITRATE 50 UG/ML
25 INJECTION, SOLUTION INTRAMUSCULAR; INTRAVENOUS
Status: COMPLETED | OUTPATIENT
Start: 2019-10-21 | End: 2019-10-21

## 2019-10-21 RX ORDER — PROPOFOL 10 MG/ML
INJECTION, EMULSION INTRAVENOUS AS NEEDED
Status: DISCONTINUED | OUTPATIENT
Start: 2019-10-21 | End: 2019-10-21 | Stop reason: SURG

## 2019-10-21 RX ORDER — 0.9 % SODIUM CHLORIDE 0.9 %
VIAL (ML) INJECTION AS NEEDED
Status: DISCONTINUED | OUTPATIENT
Start: 2019-10-21 | End: 2019-10-21 | Stop reason: HOSPADM

## 2019-10-21 RX ORDER — HEPARIN SODIUM 5000 [USP'U]/ML
5000 INJECTION, SOLUTION INTRAVENOUS; SUBCUTANEOUS
Status: COMPLETED | OUTPATIENT
Start: 2019-10-21 | End: 2019-10-21

## 2019-10-21 RX ORDER — LEVOFLOXACIN 5 MG/ML
750 INJECTION, SOLUTION INTRAVENOUS ONCE
Status: COMPLETED | OUTPATIENT
Start: 2019-10-21 | End: 2019-10-21

## 2019-10-21 RX ORDER — HYDRALAZINE HYDROCHLORIDE 20 MG/ML
20 INJECTION INTRAMUSCULAR; INTRAVENOUS ONCE
Status: COMPLETED | OUTPATIENT
Start: 2019-10-21 | End: 2019-10-21

## 2019-10-21 RX ORDER — OXYCODONE HCL 5 MG/5 ML
10 SOLUTION, ORAL ORAL EVERY 4 HOURS PRN
Status: DISCONTINUED | OUTPATIENT
Start: 2019-10-21 | End: 2019-10-21

## 2019-10-21 RX ORDER — HYDROMORPHONE HCL/PF 1 MG/ML
0.5 SYRINGE (ML) INJECTION
Status: DISCONTINUED | OUTPATIENT
Start: 2019-10-21 | End: 2019-10-21 | Stop reason: HOSPADM

## 2019-10-21 RX ORDER — ONDANSETRON 2 MG/ML
INJECTION INTRAMUSCULAR; INTRAVENOUS AS NEEDED
Status: DISCONTINUED | OUTPATIENT
Start: 2019-10-21 | End: 2019-10-21 | Stop reason: SURG

## 2019-10-21 RX ORDER — ALBUTEROL SULFATE 2.5 MG/3ML
SOLUTION RESPIRATORY (INHALATION)
Status: COMPLETED
Start: 2019-10-21 | End: 2019-10-21

## 2019-10-21 RX ORDER — MAGNESIUM HYDROXIDE 1200 MG/15ML
LIQUID ORAL AS NEEDED
Status: DISCONTINUED | OUTPATIENT
Start: 2019-10-21 | End: 2019-10-21 | Stop reason: HOSPADM

## 2019-10-21 RX ORDER — SIMETHICONE 80 MG
80 TABLET,CHEWABLE ORAL EVERY 12 HOURS
Status: DISCONTINUED | OUTPATIENT
Start: 2019-10-21 | End: 2019-10-22 | Stop reason: HOSPADM

## 2019-10-21 RX ORDER — HYDROMORPHONE HCL/PF 1 MG/ML
1 SYRINGE (ML) INJECTION EVERY 4 HOURS PRN
Status: DISCONTINUED | OUTPATIENT
Start: 2019-10-21 | End: 2019-10-22 | Stop reason: HOSPADM

## 2019-10-21 RX ORDER — HYDROMORPHONE HYDROCHLORIDE 2 MG/1
2 TABLET ORAL EVERY 4 HOURS PRN
Status: DISCONTINUED | OUTPATIENT
Start: 2019-10-21 | End: 2019-10-22 | Stop reason: HOSPADM

## 2019-10-21 RX ORDER — ACETAMINOPHEN 325 MG/1
975 TABLET ORAL EVERY 8 HOURS SCHEDULED
Status: DISCONTINUED | OUTPATIENT
Start: 2019-10-21 | End: 2019-10-22 | Stop reason: HOSPADM

## 2019-10-21 RX ORDER — LIDOCAINE HYDROCHLORIDE 20 MG/ML
INJECTION, SOLUTION EPIDURAL; INFILTRATION; INTRACAUDAL; PERINEURAL AS NEEDED
Status: DISCONTINUED | OUTPATIENT
Start: 2019-10-21 | End: 2019-10-21 | Stop reason: SURG

## 2019-10-21 RX ORDER — ROCURONIUM BROMIDE 10 MG/ML
INJECTION, SOLUTION INTRAVENOUS AS NEEDED
Status: DISCONTINUED | OUTPATIENT
Start: 2019-10-21 | End: 2019-10-21 | Stop reason: SURG

## 2019-10-21 RX ORDER — OXYCODONE HCL 5 MG/5 ML
5 SOLUTION, ORAL ORAL EVERY 4 HOURS PRN
Status: DISCONTINUED | OUTPATIENT
Start: 2019-10-21 | End: 2019-10-21

## 2019-10-21 RX ORDER — FENTANYL CITRATE 50 UG/ML
INJECTION, SOLUTION INTRAMUSCULAR; INTRAVENOUS AS NEEDED
Status: DISCONTINUED | OUTPATIENT
Start: 2019-10-21 | End: 2019-10-21 | Stop reason: SURG

## 2019-10-21 RX ORDER — PROMETHAZINE HYDROCHLORIDE 25 MG/ML
12.5 INJECTION, SOLUTION INTRAMUSCULAR; INTRAVENOUS EVERY 4 HOURS PRN
Status: DISCONTINUED | OUTPATIENT
Start: 2019-10-21 | End: 2019-10-21 | Stop reason: HOSPADM

## 2019-10-21 RX ORDER — HYDROMORPHONE HCL/PF 1 MG/ML
SYRINGE (ML) INJECTION AS NEEDED
Status: DISCONTINUED | OUTPATIENT
Start: 2019-10-21 | End: 2019-10-21 | Stop reason: SURG

## 2019-10-21 RX ORDER — ONDANSETRON 2 MG/ML
4 INJECTION INTRAMUSCULAR; INTRAVENOUS EVERY 6 HOURS PRN
Status: DISCONTINUED | OUTPATIENT
Start: 2019-10-21 | End: 2019-10-21 | Stop reason: HOSPADM

## 2019-10-21 RX ORDER — BUPIVACAINE HYDROCHLORIDE 5 MG/ML
INJECTION, SOLUTION PERINEURAL AS NEEDED
Status: DISCONTINUED | OUTPATIENT
Start: 2019-10-21 | End: 2019-10-21 | Stop reason: HOSPADM

## 2019-10-21 RX ORDER — DEXAMETHASONE SODIUM PHOSPHATE 4 MG/ML
INJECTION, SOLUTION INTRA-ARTICULAR; INTRALESIONAL; INTRAMUSCULAR; INTRAVENOUS; SOFT TISSUE AS NEEDED
Status: DISCONTINUED | OUTPATIENT
Start: 2019-10-21 | End: 2019-10-21 | Stop reason: SURG

## 2019-10-21 RX ORDER — ONDANSETRON 2 MG/ML
4 INJECTION INTRAMUSCULAR; INTRAVENOUS EVERY 4 HOURS PRN
Status: DISCONTINUED | OUTPATIENT
Start: 2019-10-21 | End: 2019-10-22 | Stop reason: HOSPADM

## 2019-10-21 RX ORDER — GLYCOPYRROLATE 0.2 MG/ML
INJECTION INTRAMUSCULAR; INTRAVENOUS AS NEEDED
Status: DISCONTINUED | OUTPATIENT
Start: 2019-10-21 | End: 2019-10-21 | Stop reason: SURG

## 2019-10-21 RX ORDER — SODIUM CHLORIDE, SODIUM LACTATE, POTASSIUM CHLORIDE, CALCIUM CHLORIDE 600; 310; 30; 20 MG/100ML; MG/100ML; MG/100ML; MG/100ML
75 INJECTION, SOLUTION INTRAVENOUS CONTINUOUS
Status: DISCONTINUED | OUTPATIENT
Start: 2019-10-21 | End: 2019-10-22 | Stop reason: HOSPADM

## 2019-10-21 RX ORDER — GABAPENTIN 300 MG/1
600 CAPSULE ORAL ONCE
Status: COMPLETED | OUTPATIENT
Start: 2019-10-21 | End: 2019-10-21

## 2019-10-21 RX ORDER — METOCLOPRAMIDE HYDROCHLORIDE 5 MG/ML
10 INJECTION INTRAMUSCULAR; INTRAVENOUS EVERY 6 HOURS PRN
Status: DISCONTINUED | OUTPATIENT
Start: 2019-10-21 | End: 2019-10-22 | Stop reason: HOSPADM

## 2019-10-21 RX ORDER — SODIUM CHLORIDE 9 MG/ML
125 INJECTION, SOLUTION INTRAVENOUS CONTINUOUS
Status: DISCONTINUED | OUTPATIENT
Start: 2019-10-21 | End: 2019-10-21 | Stop reason: HOSPADM

## 2019-10-21 RX ADMIN — MIDAZOLAM 2 MG: 1 INJECTION INTRAMUSCULAR; INTRAVENOUS at 12:02

## 2019-10-21 RX ADMIN — ONDANSETRON 4 MG: 2 INJECTION INTRAMUSCULAR; INTRAVENOUS at 12:14

## 2019-10-21 RX ADMIN — SODIUM CHLORIDE, SODIUM LACTATE, POTASSIUM CHLORIDE, AND CALCIUM CHLORIDE 75 ML/HR: .6; .31; .03; .02 INJECTION, SOLUTION INTRAVENOUS at 15:54

## 2019-10-21 RX ADMIN — DEXAMETHASONE SODIUM PHOSPHATE 4 MG: 4 INJECTION, SOLUTION INTRAMUSCULAR; INTRAVENOUS at 12:14

## 2019-10-21 RX ADMIN — HYDROMORPHONE HYDROCHLORIDE 0.5 MG: 1 INJECTION, SOLUTION INTRAMUSCULAR; INTRAVENOUS; SUBCUTANEOUS at 14:51

## 2019-10-21 RX ADMIN — KETOROLAC TROMETHAMINE 15 MG: 30 INJECTION, SOLUTION INTRAMUSCULAR at 17:58

## 2019-10-21 RX ADMIN — HYDROMORPHONE HYDROCHLORIDE 0.5 MG: 1 INJECTION, SOLUTION INTRAMUSCULAR; INTRAVENOUS; SUBCUTANEOUS at 13:13

## 2019-10-21 RX ADMIN — FENTANYL CITRATE 100 MCG: 50 INJECTION, SOLUTION INTRAMUSCULAR; INTRAVENOUS at 12:30

## 2019-10-21 RX ADMIN — LIDOCAINE HYDROCHLORIDE 60 MG: 20 INJECTION, SOLUTION EPIDURAL; INFILTRATION; INTRACAUDAL; PERINEURAL at 12:09

## 2019-10-21 RX ADMIN — PROPOFOL 200 MG: 10 INJECTION, EMULSION INTRAVENOUS at 12:09

## 2019-10-21 RX ADMIN — GLYCOPYRROLATE 0.6 MG: 0.2 INJECTION INTRAMUSCULAR; INTRAVENOUS at 13:51

## 2019-10-21 RX ADMIN — FAMOTIDINE 20 MG: 10 INJECTION, SOLUTION INTRAVENOUS at 17:59

## 2019-10-21 RX ADMIN — HYDROMORPHONE HYDROCHLORIDE 0.5 MG: 1 INJECTION, SOLUTION INTRAMUSCULAR; INTRAVENOUS; SUBCUTANEOUS at 15:01

## 2019-10-21 RX ADMIN — ACETAMINOPHEN 975 MG: 325 TABLET ORAL at 09:39

## 2019-10-21 RX ADMIN — NEOSTIGMINE METHYLSULFATE 3 MG: 1 INJECTION, SOLUTION INTRAVENOUS at 13:51

## 2019-10-21 RX ADMIN — ROCURONIUM BROMIDE 50 MG: 50 INJECTION, SOLUTION INTRAVENOUS at 12:10

## 2019-10-21 RX ADMIN — INFLUENZA A VIRUS A/BRISBANE/02/2018 (H1N1) RECOMBINANT HEMAGGLUTININ ANTIGEN, INFLUENZA A VIRUS A/KANSAS/14/2017 (H3N2) RECOMBINANT HEMAGGLUTININ ANTIGEN, INFLUENZA B VIRUS B/PHUKET/3073/2013 RECOMBINANT HEMAGGLUTININ ANTIGEN, AND INFLUENZA B VIRUS B/MARYLAND/15/2016 RECOMBINANT HEMAGGLUTININ ANTIGEN 0.5 ML: 45; 45; 45; 45 INJECTION INTRAMUSCULAR at 17:59

## 2019-10-21 RX ADMIN — SODIUM CHLORIDE: 0.9 INJECTION, SOLUTION INTRAVENOUS at 12:52

## 2019-10-21 RX ADMIN — METRONIDAZOLE 500 MG: 500 INJECTION, SOLUTION INTRAVENOUS at 12:00

## 2019-10-21 RX ADMIN — HEPARIN SODIUM 5000 UNITS: 5000 INJECTION INTRAVENOUS; SUBCUTANEOUS at 11:01

## 2019-10-21 RX ADMIN — HYDROMORPHONE HYDROCHLORIDE 1 MG: 1 INJECTION, SOLUTION INTRAMUSCULAR; INTRAVENOUS; SUBCUTANEOUS at 20:18

## 2019-10-21 RX ADMIN — FENTANYL CITRATE 25 MCG: 50 INJECTION INTRAMUSCULAR; INTRAVENOUS at 14:29

## 2019-10-21 RX ADMIN — ALBUTEROL SULFATE 2.5 MG: 2.5 SOLUTION RESPIRATORY (INHALATION) at 15:55

## 2019-10-21 RX ADMIN — FENTANYL CITRATE 25 MCG: 50 INJECTION INTRAMUSCULAR; INTRAVENOUS at 15:21

## 2019-10-21 RX ADMIN — HYDROMORPHONE HYDROCHLORIDE 0.5 MG: 1 INJECTION, SOLUTION INTRAMUSCULAR; INTRAVENOUS; SUBCUTANEOUS at 14:40

## 2019-10-21 RX ADMIN — PHENYLEPHRINE HYDROCHLORIDE 100 MCG: 10 INJECTION INTRAVENOUS at 12:52

## 2019-10-21 RX ADMIN — FENTANYL CITRATE 25 MCG: 50 INJECTION INTRAMUSCULAR; INTRAVENOUS at 15:10

## 2019-10-21 RX ADMIN — FENTANYL CITRATE 100 MCG: 50 INJECTION, SOLUTION INTRAMUSCULAR; INTRAVENOUS at 12:09

## 2019-10-21 RX ADMIN — ACETAMINOPHEN 975 MG: 325 TABLET ORAL at 21:50

## 2019-10-21 RX ADMIN — SCOPALAMINE 1 PATCH: 1 PATCH, EXTENDED RELEASE TRANSDERMAL at 09:40

## 2019-10-21 RX ADMIN — SODIUM CHLORIDE 125 ML/HR: 0.9 INJECTION, SOLUTION INTRAVENOUS at 09:53

## 2019-10-21 RX ADMIN — HYDRALAZINE HYDROCHLORIDE 20 MG: 20 INJECTION INTRAMUSCULAR; INTRAVENOUS at 15:08

## 2019-10-21 RX ADMIN — FENTANYL CITRATE 25 MCG: 50 INJECTION INTRAMUSCULAR; INTRAVENOUS at 14:36

## 2019-10-21 RX ADMIN — HYDROMORPHONE HYDROCHLORIDE 1 MG: 1 INJECTION, SOLUTION INTRAMUSCULAR; INTRAVENOUS; SUBCUTANEOUS at 15:52

## 2019-10-21 RX ADMIN — LEVOFLOXACIN: 5 INJECTION, SOLUTION INTRAVENOUS at 11:54

## 2019-10-21 RX ADMIN — PROMETHAZINE HYDROCHLORIDE 25 MG: 25 INJECTION INTRAMUSCULAR; INTRAVENOUS at 15:58

## 2019-10-21 RX ADMIN — PHENYLEPHRINE HYDROCHLORIDE 100 MCG: 10 INJECTION INTRAVENOUS at 13:10

## 2019-10-21 RX ADMIN — HYDROMORPHONE HYDROCHLORIDE 0.5 MG: 1 INJECTION, SOLUTION INTRAMUSCULAR; INTRAVENOUS; SUBCUTANEOUS at 13:03

## 2019-10-21 RX ADMIN — ONDANSETRON 4 MG: 2 INJECTION INTRAMUSCULAR; INTRAVENOUS at 14:33

## 2019-10-21 RX ADMIN — GABAPENTIN 600 MG: 300 CAPSULE ORAL at 09:39

## 2019-10-21 NOTE — ANESTHESIA PREPROCEDURE EVALUATION
Review of Systems/Medical History  Patient summary reviewed  Chart reviewed  No history of anesthetic complications     Cardiovascular  Hyperlipidemia, Hypertension controlled,   PE,  Pulmonary  Asthma , well controlled/ stable Last rescue: < 6 months ago Asthma type of rescue: PRN inhaler, Sleep apnea ,        GI/Hepatic    GERD well controlled,             Endo/Other  Diabetes type 2 Oral agent,   Comment: dysmetabolc syndrome X Obesity (43=BMI)  morbid obesity   GYN  Negative gynecology ROS          Hematology  Negative hematology ROS      Musculoskeletal    Arthritis     Neurology   Psychology   Anxiety, Depression , bipolar disorder and being treated for depression,              Physical Exam    Airway    Mallampati score: II  TM Distance: >3 FB  Neck ROM: full     Dental   No notable dental hx     Cardiovascular  Rhythm: regular, Rate: normal, Cardiovascular exam normal    Pulmonary  Pulmonary exam normal Breath sounds clear to auscultation,     Other Findings        Anesthesia Plan  ASA Score- 3     Anesthesia Type- general with ASA Monitors  Additional Monitors:   Airway Plan: ETT  Comment: SCOP patch ordered  Plan Factors-Patient not instructed to abstain from smoking on day of procedure  Patient did not smoke on day of surgery  Induction- intravenous  Postoperative Plan-     Informed Consent- Anesthetic plan and risks discussed with patient

## 2019-10-21 NOTE — PLAN OF CARE
Problem: Potential for Falls  Goal: Patient will remain free of falls  Description  INTERVENTIONS:  - Assess patient frequently for physical needs  -  Identify cognitive and physical deficits and behaviors that affect risk of falls    -  [place] fall precautions as indicated by assessment   - Educate patient/family on patient safety including physical limitations  - Instruct patient to call for assistance with activity based on assessment  - Modify environment to reduce risk of injury  - Consider OT/PT consult to assist with strengthening/mobility  Outcome: Progressing     Problem: CHANGE IN BODY IMAGE  Goal: Pt/Family communicate acceptance of loss or change in body image and expresses psychological comfort and peace  Description  INTERVENTIONS:  - Assess patient/family anxiety and grief process related to change in body image, loss of functional status and loss of sense of self  - Assess patient/family's coping skills and provide emotional, spiritual and psychosocial support  - Provide information about the patient's health status with consideration of family and cultural values  - Communicate willingness to discuss loss and facilitate grief process with patient/family as appropriate  - Emphasize sustaining relationships within family system and community, or eileen/spiritual traditions  - Refer to community support groups as appropriate  - Initiate Spiritual Care, Pastoral care or other ancillary consults as needed  Outcome: Progressing     Problem: CARDIOVASCULAR - ADULT  Goal: Maintains optimal cardiac output and hemodynamic stability  Description  INTERVENTIONS:  - Monitor I/O, vital signs and rhythm  - Monitor for S/S and trends of decreased cardiac output  - Administer and titrate ordered vasoactive medications to optimize hemodynamic stability  - Assess quality of pulses, skin color and temperature  - Assess for signs of decreased coronary artery perfusion  - Instruct patient to report change in severity of symptoms  Outcome: Progressing  Goal: Absence of cardiac dysrhythmias or at baseline rhythm  Description  INTERVENTIONS:  - Continuous cardiac monitoring, vital signs, obtain 12 lead EKG if ordered  - Administer antiarrhythmic and heart rate control medications as ordered  - Monitor electrolytes and administer replacement therapy as ordered  Outcome: Progressing     Problem: RESPIRATORY - ADULT  Goal: Achieves optimal ventilation and oxygenation  Description  INTERVENTIONS:  - Assess for changes in respiratory status  - Assess for changes in mentation and behavior  - Position to facilitate oxygenation and minimize respiratory effort  - Oxygen administered by appropriate delivery if ordered  - Initiate smoking cessation education as indicated  - Encourage broncho-pulmonary hygiene including cough, deep breathe, Incentive Spirometry  - Assess the need for suctioning and aspirate as needed  - Assess and instruct to report SOB or any respiratory difficulty  - Respiratory Therapy support as indicated  Outcome: Progressing     Problem: GASTROINTESTINAL - ADULT  Goal: Minimal or absence of nausea and/or vomiting  Description  INTERVENTIONS:  - Administer IV fluids if ordered to ensure adequate hydration  - Maintain NPO status until nausea and vomiting are resolved  - Nasogastric tube if ordered  - Administer ordered antiemetic medications as needed  - Provide nonpharmacologic comfort measures as appropriate  - Advance diet as tolerated, if ordered  - Consider nutrition services referral to assist patient with adequate nutrition and appropriate food choices  Outcome: Progressing  Goal: Maintains or returns to baseline bowel function  Description  INTERVENTIONS:  - Assess bowel function  - Encourage oral fluids to ensure adequate hydration  - Administer IV fluids if ordered to ensure adequate hydration  - Administer ordered medications as needed  - Encourage mobilization and activity  - Consider nutritional services referral to assist patient with adequate nutrition and appropriate food choices  Outcome: Progressing  Goal: Maintains adequate nutritional intake  Description  INTERVENTIONS:  - Monitor percentage of each meal consumed  - Identify factors contributing to decreased intake, treat as appropriate  - Assist with meals as needed  - Monitor I&O, weight, and lab values if indicated  - Obtain nutrition services referral as needed  Outcome: Progressing     Problem: GENITOURINARY - ADULT  Goal: Maintains or returns to baseline urinary function  Description  INTERVENTIONS:  - Assess urinary function  - Encourage oral fluids to ensure adequate hydration if ordered  - Administer IV fluids as ordered to ensure adequate hydration  - Administer ordered medications as needed  - Offer frequent toileting  - Follow urinary retention protocol if ordered  Outcome: Progressing  Goal: Absence of urinary retention  Description  INTERVENTIONS:  - Assess patients ability to void and empty bladder  - Monitor I/O  - Bladder scan as needed  - Discuss with physician/AP medications to alleviate retention as needed  - Discuss catheterization for long term situations as appropriate  Outcome: Progressing     Problem: METABOLIC, FLUID AND ELECTROLYTES - ADULT  Goal: Electrolytes maintained within normal limits  Description  INTERVENTIONS:  - Monitor labs and assess patient for signs and symptoms of electrolyte imbalances  - Administer electrolyte replacement as ordered  - Monitor response to electrolyte replacements, including repeat lab results as appropriate  - Instruct patient on fluid and nutrition as appropriate  Outcome: Progressing  Goal: Fluid balance maintained  Description  INTERVENTIONS:  - Monitor labs   - Monitor I/O and WT  - Instruct patient on fluid and nutrition as appropriate  - Assess for signs & symptoms of volume excess or deficit  Outcome: Progressing     Problem: SKIN/TISSUE INTEGRITY - ADULT  Goal: Skin integrity remains intact  Description  INTERVENTIONS  - Identify patients at risk for skin breakdown  - Assess and monitor skin integrity  - Assess and monitor nutrition and hydration status  - Monitor labs (i e  albumin)  - Assess for incontinence   - Turn and reposition patient  - Assist with mobility/ambulation  - Relieve pressure over bony prominences  - Avoid friction and shearing  - Provide appropriate hygiene as needed including keeping skin clean and dry  - Evaluate need for skin moisturizer/barrier cream  - Collaborate with interdisciplinary team (i e  Nutrition, Rehabilitation, etc )   - Patient/family teaching  Outcome: Progressing  Goal: Incision(s), wounds(s) or drain site(s) healing without S/S of infection  Description  INTERVENTIONS  - Assess and document risk factors for skin impairment   - Assess and document dressing, incision, wound bed, drain sites and surrounding tissue  - Consider nutrition services referral as needed  - Oral mucous membranes remain intact  - Provide patient/ family education  Outcome: Progressing  Goal: Oral mucous membranes remain intact  Description  INTERVENTIONS  - Assess oral mucosa and hygiene practices  - Implement preventative oral hygiene regimen  - Implement oral medicated treatments as ordered  - Initiate Nutrition services referral as needed  Outcome: Progressing     Problem: HEMATOLOGIC - ADULT  Goal: Maintains hematologic stability  Description  INTERVENTIONS  - Assess for signs and symptoms of bleeding or hemorrhage  - Monitor labs  - Administer supportive blood products/factors as ordered and appropriate  Outcome: Progressing     Problem: MUSCULOSKELETAL - ADULT  Goal: Maintain or return mobility to safest level of function  Description  INTERVENTIONS:  - Assess patient's ability to carry out ADLs; assess patient's baseline for ADL function and identify physical deficits which impact ability to perform ADLs (bathing, care of mouth/teeth, toileting, grooming, dressing, etc )  - Assess/evaluate cause of self-care deficits   - Assess range of motion  - Assess patient's mobility  - Assess patient's need for assistive devices and provide as appropriate  - Encourage maximum independence but intervene and supervise when necessary  - Involve family in performance of ADLs  - Assess for home care needs following discharge   - Consider OT consult to assist with ADL evaluation and planning for discharge  - Provide patient education as appropriate  Outcome: Progressing  Goal: Maintain proper alignment of affected body part  Description  INTERVENTIONS:  - Support, maintain and protect limb and body alignment  - Provide patient/ family with appropriate education  Outcome: Progressing

## 2019-10-21 NOTE — OP NOTE
[facility]   [address] [facility], [address]  [phone] (Fax)      Operative Report  LAPAROSCOPIC SLEEVE GASTRECTOMY AND INTRAOPERATIVE EGD     Patient Name: Dasia Ortiz    :  1965  MRN: [id number]  Patient Location: [facility] OR ROOM 08  Surgery Date : 10/21/2019  Surgeons:  Surgeon(s) and Role:     * Billy Vila MD [address]    Diagnosis:    Pre-Op Diagnosis Codes: Morbid obesity ([facility] ) [E66 01]  Body mass index is 42 91 kg/m²  Type 2 diabetes mellitus with complication, unspecified whether long term insulin use [E11 8]  Sleep apnea, unspecified type [G47 30]    Post-Op Diagnosis Codes:     * Morbid obesity ([facility] ) [E66 01]     * Body mass index is 42 91 kg/m²  * Type 2 diabetes mellitus with complication, unspecified whether long term insulin use [E11 8]     * Sleep apnea, unspecified type [G47 30]    Procedure  1  Laparoscopic Sleeve Gastrectomy  2  Intraoperative Endoscopy    Specimen(s):  ID Type Source Tests Collected by Time Destination   1 : Portion of stomach Tissue Stomach TISSUE EXAM Billy Vila MD 10/21/2019 1255        Estimated Blood Loss:    20 cc    Anesthesia Type:     General    Operative Indications: Morbid obesity ([facility] ) [E66 01]  Type 2 diabetes mellitus with complication, unspecified whether long term insulin use [E11 8]  Sleep apnea, unspecified type [G47 30]  Body mass index is 42 91 kg/m²  Operative Findings:    Multiple adhesions as a result of her previous surgeries    Complications:     None    Procedure and Technique:    Geovany Alvarado is a 48 y o  female with a Body mass index is 42 91 kg/m²  and a long standing history of morbid obesity and inability to lose a significant amount of weight on its own    This patient was found to be a good candidate to undergo a bariatric procedure upon being enrolled here at the [facility]  OPERATIVE TECHNIQUE    The patient was taken to the operating room and placed in a supine position  A dose of IV antibiotic prophylaxis that consisted of Levofloxacin 750mg and 500 mg of Metronidazole was given  Also 5,000 Units of SQ heparin to prevent deep vein thrombosis were administered  Sequential compression devices were placed on both lower extremities  After satisfactory general anesthesia induction and endotracheal intubation was achieved, the extremities were placed and properly secured to prevent neuromuscular damage as best as possible  Subsequently, the abdominal wall was prepped and draped in a surgical standard sterile fashion  After a timeout was done and the patient was properly identified and the type of procedure was confirmed a supra-umbilical transverse skin incision was made and the subcutaneous tissues dissected  Access to the peritoneal cavity was gained with the Visiport trocar  With this device, we were able to visualize the layers of the abdominal wall, and enter the peritoneal cavity under direct visualization  Pneumoperitoneum was then established with CO2 insufflation  A four quadrant transversus abdominis plane block was performed under direct laparoscopic vision  After this was completed four additional trocars were placed: a 12 mm in the right upper quadrant subcostal position in the anterior axillary line, a 12-mm port was placed in the right flank midclavicular line, a 12-mm port was placed in the left upper quadrant subcostal position in the midclavicular line and another 12-mm port was placed in the left quadrant anterior axillary line lateral to the supraumbilical port  Multiple adhesions involving the omentum and several loops of small bowel were noted in the lower abdomen  At this point we decided to proceed with the sleeve gastrectomy as it was discussed with her prior to the surgery    The [facility] liver retractor was placed in the subxiphoid position through the use of a 5-mm trocar incision  The patient was repositioned to a reverse Trendelenburg position  With the trocars in place, the dissection was begun  We divided the gastrocolic ligament with the energy device to enter the lesser sac  We continued to divide this ligament along the greater curvature of the stomach towards the angle of His  Special care was taken while dividing the short gastric vessels close to the spleen  This process was completely hemostatic  We then turned to the creation of an elongated and thin gastric pouch  A 36 Faroese calibration tube was placed by the anesthesia staff into the stomach under our laparoscopic surveillance  Once the tip of the bougie was confirmed to be next to the pylorus, serial firings of the laparoscopic stapler with 60-mm cartridges were utilized  We started in a point inferior to the incisura angularis and the Crows foot nerve looking to preserve the gastric emptying  This was 5 to 6 centimeters proximal to the pylorus  The staple lines were reinforced with buttressing material  We created a pouch based on the lesser curve, and in vertical orientation  We continued the vertical serial firings of the stapler to the angle of His gently cinching the bougie with our laparoscopic stapler looking to create a thin pouch  As we approached the fundus of the stomach and the angle of His, the stapler loads were changed appropriately according to the variable thickness of the tissue  This completely  the pouch from the remnant stomach  We then turned our attention to the newly created pouch and examined it for bleeding or obvious defects on the staple lines and none were found  The distal stomach pouch was occluded with a Rachel clamp, and an EGD as well as an air insufflation test was performed  Neither intraoperative bleeding nor leaks were detected       The periumbilical trocar site was dilated and the gastric remnant was externalized through it and passed off the surgical field to be sent to pathology  I then covered the staple line with a tongue of omentum in a Livan patch fashion and secured it in place with a single 2/0 Vicryl stitch  The sponge, needle and instrument count was reported complete  The previously dilated trocar site was then closed with use of a suture closure device and a figure-of-eight with absorbable suture  The liver retractor and the remainder ports were then removed under direct laparoscopic visualization and no back bleeding was noted  The skin incisions were all closed with 4-0 absorbable subcuticular suture  The patient tolerated the procedure well, was extubated uneventfully and was transferred to the recovery room in stable condition  I was present for the entire length of the procedure as the attending of record  No qualified resident was available to assist   The presence of an assistant was necessary for camera holding, traction and counter traction and for help with suturing and stapling in addition to performing the intraop-EGD        Patient Disposition:    PACU     Signature: Kal Mendez MD  Date: October 21, 2019  Time: 1:28 PM

## 2019-10-21 NOTE — ANESTHESIA POSTPROCEDURE EVALUATION
Post-Op Assessment Note    CV Status:  Stable  Pain Score: 2    Pain management: adequate     Mental Status:  Alert and awake   Hydration Status:  Euvolemic   PONV Controlled:  Controlled   Airway Patency:  Patent   Post Op Vitals Reviewed: Yes      Staff: Anesthesiologist           /86 (10/21/19 1404)    Temp 97 7 °F (36 5 °C) (10/21/19 1404)    Pulse 60 (10/21/19 1404)   Resp 22 (10/21/19 1404)    SpO2 97 % (10/21/19 1404)

## 2019-10-21 NOTE — RAPID RESPONSE
Progress Note - Rapid Response   Horace Tijerinao 48 y o  female MRN: [id number]    Time Called ( Time): 9360  Date Called: 10/21/19  Level of Care: MS  Room#: 786  NSXEPDU Time ( Time): 8380  Event End Time ( Time): 1600  Primary reason for call: Other Pain and anxiety  Interventions:  Airway/Breathing:  No Intervention  Circulation: N/A  Other Treatments: N/A       Assessment:   1  Patient status post laparoscopic sleeve gastrectomy and intraoperative endoscopy secondary to morbid obesity with a BMI of 42 9, now with anxiety/shortness of air/epigastric pain  2  Type 2 diabetes mellitus  3  Anxiety  4  History of asthma    Plan:   · Patient's symptomatology improved with upright seated position  Will continue to maintain upright seated position in immediate postop period  · Will give breathing treatment x1 now  · Will administer pain medications as ordered       HPI/Chief Complaint (Background/Situation):   Early Medico is a 48y o  year old female who presents with with known history of morbid obesity and BMI of 42 91, type 2 diabetes mellitus with long-term insulin use, sleep apnea, and asthma  Who presents to [place] [address] postoperatively after laparoscopic sleeve gastrectomy and intraoperative endoscopy with complaints shortness of breath and epigastric pain  Upon my arrival patient was supine in the stretcher outside of [place] [address] room with staff in attendance complaining of epigastric pain and shortness of breath  The patient was transition to room and sat in upright position with immediate resolution shortness of breath and pain  Patient was subsequently transitioned to bedside chair with improvement in symptomatology      Historical Information   Past Medical History:   Diagnosis Date    Acute bronchitis     Anxiety     Arthritis     Asthma     Continuous positive airway pressure dependence     CPAP (continuous positive airway pressure) dependence     Depression     Diabetes mellitus     GERD (gastroesophageal reflux disease)     Hyperlipidemia     Hypertension     Morbid obesity     Sleep apnea     USES C PAP     Past Surgical History:   Procedure Laterality Date    BREAST SURGERY      bilat lumpectomy     SECTION      twice    CHOLECYSTECTOMY      EGD      HYSTERECTOMY      KNEE ARTHROSCOPY Bilateral     WISDOM TOOTH EXTRACTION       Social History   Social History     Substance and Sexual Activity   Alcohol Use Not Currently    Frequency: Monthly or less    Comment: occasional     Social History     Substance and Sexual Activity   Drug Use No     Social History     Tobacco Use   Smoking Status Never Smoker   Smokeless Tobacco Never Used     Family History: non-contributory    Meds/Allergies     Current Facility-Administered Medications:  acetaminophen 975 mg Oral Q8H Terell Carvalho MD    [START ON 10/22/2019] enoxaparin 40 mg Subcutaneous Q24H Terell Carvalho MD    famotidine 20 mg Intravenous BID Osmar Razo MD    HYDROmorphone 1 mg Intravenous Q4H PRN Osmar Razo MD    HYDROmorphone 2 mg Oral Q4H PRN Gina Garcia MD    influenza vaccine 0 5 mL Intramuscular Once Osmar Razo MD    ketorolac 15 mg Intravenous Q6H Terell , MD    lactated ringers 75 mL/hr Intravenous Continuous Osmar Razo MD Last Rate: 75 mL/hr (10/21/19 1554)   metoclopramide 10 mg Intravenous Q6H PRN Osmar Razo MD    ondansetron 4 mg Intravenous Q4H PRN Osmar Razo MD    phenol 2 spray Mouth/Throat Q2H PRN Osmar Razo MD    promethazine 25 mg Intravenous Q4H PRN Osmar Razo MD    simethicone 80 mg Oral Q12H Osmar Razo MD          lactated ringers 75 mL/hr Last Rate: 75 mL/hr (10/21/19 1554)       Allergies   Allergen Reactions    Amoxicillin Hives    Amoxicillin-Pot Clavulanate Hives    Oxycodone-Acetaminophen Vomiting    Sulfa Antibiotics Hives    Erythromycin GI Intolerance    Wound Dressings Rash     Plastic adhesive tape- okay with paper tape         Vitals:   Vitals:    10/21/19 1519 10/21/19 1556 10/21/19 1558 10/21/19 1607   BP: 142/68  139/69    BP Location:   Left arm    Pulse: 86  90    Resp: 20  20    Temp: 97 9 °F (36 6 °C)  (!) 97 4 °F (36 3 °C)    TempSrc:   Temporal    SpO2: 96% 94% 99% 93%   Weight:       Height:         Physical Exam:  Gen:  Anxious  Age appropriate affect and behavior  HEENT:  Normocephalic, atraumatic, pupils are 3 and brisk bilaterally, extraocular movements are intact, no facial droop, no slurred speech  Neck:  No JVD, no adenopathy  Chest:  Lungs are clear to auscultation anterior posteriorly  Cor:  S1/S2, regular rate and rhythm, no murmur/rubs/gallops/click  Abd:  Soft, mild distention, complaints of epigastric pain    Ext:  Moves all extremities with purpose, palpable pulses x4 extremities, no noted edema  Neuro:  Cranial nerves 2-12 are grossly intact, GCS is 15, follows all commands  Skin:  Thank/warm/dry      Intake/Output Summary (Last 24 hours) at 10/21/2019 1633  Last data filed at 10/21/2019 1533  Gross per 24 hour   Intake 2000 ml   Output 20 ml   Net 1980 ml       Respiratory    Lab Data (Last 4 hours)    None         O2/Vent Data (Last 4 hours)    None              Invasive Devices     Peripheral Intravenous Line            Peripheral IV 10/21/19 Right Hand less than 1 day                  OUTCOME:   Stayed in room   Family notified of transfer:  No transfer required  Family member contacted:    Code Status: Level 1 - Full Code

## 2019-10-21 NOTE — INTERVAL H&P NOTE
H&P reviewed  After examining the patient I find no changes in the patients condition since the H&P had been written      Vitals:    10/21/19 0930   BP: 146/85   Pulse: 78   Resp: 16   Temp: 97 6 °F (36 4 °C)   SpO2: 95%

## 2019-10-21 NOTE — PERIOPERATIVE NURSING NOTE
Pt states she no longer has to take blood pressure and  diabetic medication at this time since she has been on her special diet

## 2019-10-22 VITALS
WEIGHT: 250 LBS | HEIGHT: 64 IN | BODY MASS INDEX: 42.68 KG/M2 | TEMPERATURE: 97.5 F | OXYGEN SATURATION: 97 % | RESPIRATION RATE: 20 BRPM | SYSTOLIC BLOOD PRESSURE: 161 MMHG | HEART RATE: 66 BPM | DIASTOLIC BLOOD PRESSURE: 79 MMHG

## 2019-10-22 LAB
ANION GAP SERPL CALCULATED.3IONS-SCNC: 10 MMOL/L (ref 4–13)
BUN SERPL-MCNC: 11 MG/DL (ref 5–25)
CALCIUM SERPL-MCNC: 9.2 MG/DL (ref 8.3–10.1)
CHLORIDE SERPL-SCNC: 101 MMOL/L (ref 100–108)
CO2 SERPL-SCNC: 30 MMOL/L (ref 21–32)
CREAT SERPL-MCNC: 0.81 MG/DL (ref 0.6–1.3)
ERYTHROCYTE [DISTWIDTH] IN BLOOD BY AUTOMATED COUNT: 13.8 % (ref 11.6–15.1)
GFR SERPL CREATININE-BSD FRML MDRD: 84 ML/MIN/1.73SQ M
GLUCOSE SERPL-MCNC: 137 MG/DL (ref 65–140)
HCT VFR BLD AUTO: 41.1 % (ref 34.8–46.1)
HGB BLD-MCNC: 13.2 G/DL (ref 11.5–15.4)
MCH RBC QN AUTO: 29 PG (ref 26.8–34.3)
MCHC RBC AUTO-ENTMCNC: 32.1 G/DL (ref 31.4–37.4)
MCV RBC AUTO: 90 FL (ref 82–98)
PLATELET # BLD AUTO: 397 THOUSANDS/UL (ref 149–390)
PMV BLD AUTO: 9.1 FL (ref 8.9–12.7)
POTASSIUM SERPL-SCNC: 3.2 MMOL/L (ref 3.5–5.3)
RBC # BLD AUTO: 4.55 MILLION/UL (ref 3.81–5.12)
SODIUM SERPL-SCNC: 141 MMOL/L (ref 136–145)
WBC # BLD AUTO: 11.96 THOUSAND/UL (ref 4.31–10.16)

## 2019-10-22 PROCEDURE — 80048 BASIC METABOLIC PNL TOTAL CA: CPT | Performed by: SURGERY

## 2019-10-22 PROCEDURE — 85027 COMPLETE CBC AUTOMATED: CPT | Performed by: SURGERY

## 2019-10-22 PROCEDURE — 99253 IP/OBS CNSLTJ NEW/EST LOW 45: CPT | Performed by: INTERNAL MEDICINE

## 2019-10-22 PROCEDURE — 99024 POSTOP FOLLOW-UP VISIT: CPT | Performed by: SURGERY

## 2019-10-22 PROCEDURE — NC001 PR NO CHARGE: Performed by: PHYSICIAN ASSISTANT

## 2019-10-22 RX ORDER — ACETAMINOPHEN 325 MG/1
975 TABLET ORAL EVERY 8 HOURS SCHEDULED
Qty: 30 TABLET | Refills: 0 | Status: SHIPPED | OUTPATIENT
Start: 2019-10-22

## 2019-10-22 RX ORDER — POTASSIUM CHLORIDE 14.9 MG/ML
20 INJECTION INTRAVENOUS ONCE
Status: COMPLETED | OUTPATIENT
Start: 2019-10-22 | End: 2019-10-22

## 2019-10-22 RX ORDER — HYDROCODONE BITARTRATE AND ACETAMINOPHEN 5; 325 MG/1; MG/1
1 TABLET ORAL EVERY 6 HOURS PRN
Qty: 15 TABLET | Refills: 0 | Status: SHIPPED | OUTPATIENT
Start: 2019-10-22 | End: 2019-10-31 | Stop reason: ALTCHOICE

## 2019-10-22 RX ORDER — ONDANSETRON 4 MG/1
4 TABLET, FILM COATED ORAL EVERY 8 HOURS PRN
Qty: 9 EACH | Refills: 0 | Status: SHIPPED | OUTPATIENT
Start: 2019-10-22 | End: 2020-01-31 | Stop reason: ALTCHOICE

## 2019-10-22 RX ADMIN — SIMETHICONE CHEW TAB 80 MG 80 MG: 80 TABLET ORAL at 03:19

## 2019-10-22 RX ADMIN — HYDROMORPHONE HYDROCHLORIDE 1 MG: 1 INJECTION, SOLUTION INTRAMUSCULAR; INTRAVENOUS; SUBCUTANEOUS at 11:06

## 2019-10-22 RX ADMIN — KETOROLAC TROMETHAMINE 15 MG: 30 INJECTION, SOLUTION INTRAMUSCULAR at 12:29

## 2019-10-22 RX ADMIN — KETOROLAC TROMETHAMINE 15 MG: 30 INJECTION, SOLUTION INTRAMUSCULAR at 06:08

## 2019-10-22 RX ADMIN — ONDANSETRON 4 MG: 2 INJECTION INTRAMUSCULAR; INTRAVENOUS at 03:19

## 2019-10-22 RX ADMIN — ACETAMINOPHEN 975 MG: 325 TABLET ORAL at 06:08

## 2019-10-22 RX ADMIN — HYDROMORPHONE HYDROCHLORIDE 1 MG: 1 INJECTION, SOLUTION INTRAMUSCULAR; INTRAVENOUS; SUBCUTANEOUS at 04:59

## 2019-10-22 RX ADMIN — KETOROLAC TROMETHAMINE 15 MG: 30 INJECTION, SOLUTION INTRAMUSCULAR at 00:05

## 2019-10-22 RX ADMIN — ACETAMINOPHEN 975 MG: 325 TABLET ORAL at 15:47

## 2019-10-22 RX ADMIN — SIMETHICONE CHEW TAB 80 MG 80 MG: 80 TABLET ORAL at 15:43

## 2019-10-22 RX ADMIN — ENOXAPARIN SODIUM 40 MG: 40 INJECTION SUBCUTANEOUS at 11:03

## 2019-10-22 RX ADMIN — FAMOTIDINE 20 MG: 10 INJECTION, SOLUTION INTRAVENOUS at 11:03

## 2019-10-22 RX ADMIN — POTASSIUM CHLORIDE 20 MEQ: 14.9 INJECTION, SOLUTION INTRAVENOUS at 11:06

## 2019-10-22 RX ADMIN — METOCLOPRAMIDE 10 MG: 5 INJECTION, SOLUTION INTRAMUSCULAR; INTRAVENOUS at 05:05

## 2019-10-22 NOTE — UTILIZATION REVIEW
Initial Clinical Review    Elective   IP    surgical procedure    Age/Sex: 48 y o  female     Surgery Date:   10/21/2019    1  Procedure: Laparoscopic Sleeve Gastrectomy  Intraoperative Endoscopy    Anesthesia:    general    Operative Findings: Multiple adhesions as a result of her previous surgeries    Admission Orders: Date/Time/Statement: Inpatient Admission Orders (From admission, onward)     Ordered        10/21/19 1338  Inpatient Admission  Once                   Orders Placed This Encounter   Procedures    Inpatient Admission     Standing Status:   Standing     Number of Occurrences:   1     Order Specific Question:   Admitting Physician     Answer:   Jamarcus Hidalgo     Order Specific Question:   Level of Care     Answer:   Med Surg [16]     Order Specific Question:   Bed Type     Answer:   Bariatric [1]     Order Specific Question:   Estimated length of stay     Answer:   One Midnight     Vital Signs: /69 (BP Location: Right arm)   Pulse 75   Temp (!) 97 °F (36 1 °C) (Temporal)   Resp 18   Ht 5' 4" (1 626 m)   Wt 113 kg (250 lb)   SpO2 96%   BMI 42 91 kg/m²      Diet:   Bariatric  Cl liq  diet    Mobility:  As farnaz    DVT Prophylaxis:   SCD'S    Medications/Pain Control:     Medications:  acetaminophen 975 mg Oral Q8H [facility]   enoxaparin 40 mg Subcutaneous Q24H [facility]   famotidine 20 mg Intravenous BID   ketorolac 15 mg Intravenous Q6H [facility]   potassium chloride 20 mEq Intravenous Once   simethicone 80 mg Oral Q12H       lactated ringers 75 mL/hr Intravenous Continuous       HYDROmorphone 1 mg Intravenous Q4H PRN   X1  10/21   And  X 2  10/22 thus far   HYDROmorphone 2 mg Oral Q4H PRN   metoclopramide 10 mg Intravenous Q6H PRN   X1  10/22 thus far   ondansetron 4 mg Intravenous Q4H PRN   X1  10/22 thus far   phenol 2 spray Mouth/Throat Q2H PRN   promethazine 25 mg Intravenous Q4H PRN       Network Utilization Review Department  [email or web address]  ATTENTION: Please call with any questions or concerns to [phone] and carefully listen to the prompts so that you are directed to the right person  All voicemails are confidential   Ginny Gillespie all requests for admission clinical reviews, approved or denied determinations and any other requests to dedicated fax number below belonging to the campus where the patient is receiving treatment    FACILITY NAME UR FAX NUMBER   ADMISSION DENIALS (Administrative/Medical Necessity) [address] [facility] (Maternity/NICU/Pediatrics) [phone]   [facility] [address] [place] [address] [address] [facility] [phone]   [address] [place] [address] 15[address] [facility] [phone]   [facility] [address] [phone]

## 2019-10-22 NOTE — CONSULTS
Inpatient Medical Consultation - [facility] Medicine    Patient Information: Long Preciado 48 y o  female MRN: [id number]  Unit/Bed#: E5 -01 Encounter: [id number]  PCP: Ruel Centeno  Date of Admission:  10/21/2019  Date of Consultation: 10/22/19  Requesting Physician: Brianna Jean-Baptiste MD    Reason For Consultation:   Medical management  Assessment/Plan:  1  Morbid obesity with bmi of 42 9 s/p lap sleeve gastrectomy- per primary team    2  Dyspnea- positional  Has since resolved    3  Type 2 diabetes- continue diet and weight loss  Pt is off of medications    4  htn- stable off of medications  5  Hyperlipidemia- continue statin    6  Bipolar d/o- continue lamictal and effexor at tid dosing  Continue xanax prn at bedtime  7  gerd- continue ppi    History of Present Illness:    Long Preciado is a 48 y o  female who is originally admitted to the bariatric service on 10/21/2019 due to morbid obesity with bmi of 42 9  S/p lap sleeve gastrectomy  We are consulted for medical management  Pt had an episode of shortness of breath yesterday after her procedure  She changed her position by sitting up and her shortness of breath resolved  She denies further shortness of breath  She has a history of diabetes and htn  She has been taken off of all of her htn medications along with her metformin  Her effexor was changed from long acting form to intermediate form already  She reports no increase in abd pain  No f/c no cp no sob no n/v/d  She is tolerating the diet well and has been ambulating  Review of Systems:    Review of Systems   Constitutional: Negative  HENT: Negative  Eyes: Negative  Respiratory: Negative  Cardiovascular: Negative  Gastrointestinal: Positive for abdominal pain  Negative for diarrhea, nausea and vomiting  Endocrine: Negative  Genitourinary: Negative  Musculoskeletal: Negative  Skin: Negative  Allergic/Immunologic: Negative      Neurological: Negative  Hematological: Negative  Psychiatric/Behavioral: Negative  Past Medical and Surgical History:     Past Medical History:   Diagnosis Date    Acute bronchitis     Anxiety     Arthritis     Asthma     Continuous positive airway pressure dependence     CPAP (continuous positive airway pressure) dependence     Depression     Diabetes mellitus     GERD (gastroesophageal reflux disease)     Hyperlipidemia     Hypertension     Morbid obesity     Sleep apnea     USES C PAP       Past Surgical History:   Procedure Laterality Date    BREAST SURGERY      bilat lumpectomy     SECTION      twice    CHOLECYSTECTOMY      EGD      HYSTERECTOMY      KNEE ARTHROSCOPY Bilateral     AL LAP, CLARE RESTRICT PROC, LONGITUDINAL GASTRECTOMY N/A 10/21/2019    Procedure: LAPAROSCOPIC SLEEVE GASTRECTOMY AND INTRAOPERATIVE EGD;  Surgeon: Ramez Ibrahim MD;  Location: [facility];  Service: Bariatrics    WISDOM TOOTH EXTRACTION         Meds/Allergies:    all medications and allergies reviewed    Allergies:    Allergies   Allergen Reactions    Amoxicillin Hives    Amoxicillin-Pot Clavulanate Hives    Oxycodone-Acetaminophen Vomiting    Sulfa Antibiotics Hives    Erythromycin GI Intolerance    Wound Dressings Rash     Plastic adhesive tape- okay with paper tape       Social History:     Marital Status: /Civil Union    Substance Use History:   Social History     Substance and Sexual Activity   Alcohol Use Not Currently    Frequency: Monthly or less    Comment: occasional     Social History     Tobacco Use   Smoking Status Never Smoker   Smokeless Tobacco Never Used     Social History     Substance and Sexual Activity   Drug Use No       Family History:    Family History   Problem Relation Age of Onset    Breast cancer Mother     Hypertension Mother    [facility] Brain cancer Mother     Prostate cancer Father     Melanoma Father     Diabetes Father     Hypertension Father     Heart disease Father     Skin cancer Father     Thyroid disease Neg Hx     Stroke Neg Hx        Physical Exam:     Vitals:   Blood Pressure: 135/69 (10/22/19 0727)  Pulse: 75 (10/22/19 0727)  Temperature: (!) 97 °F (36 1 °C) (10/22/19 0727)  Temp Source: Temporal (10/22/19 0727)  Respirations: 18 (10/22/19 0727)  Height: 5' 4" (162 6 cm) (10/21/19 0930)  Weight - Scale: 113 kg (250 lb) (10/21/19 0930)  SpO2: 96 % (10/22/19 0727)    Physical Exam   Constitutional: She is oriented to person, place, and time  No distress  HENT:   Head: Normocephalic and atraumatic  Eyes: Pupils are equal, round, and reactive to light  EOM are normal    Neck: Normal range of motion  Neck supple  Cardiovascular: Normal rate, regular rhythm and normal heart sounds  Pulmonary/Chest: Effort normal and breath sounds normal  No stridor  No respiratory distress  She has no wheezes  Abdominal: Soft  Bowel sounds are normal  She exhibits no distension  There is tenderness  Musculoskeletal: Normal range of motion  Neurological: She is alert and oriented to person, place, and time  Skin: Skin is warm and dry  She is not diaphoretic  Additional Data:     Lab Results: I have personally reviewed pertinent reports        Results from last 7 days   Lab Units 10/22/19  0527   WBC Thousand/uL 11 96*   HEMOGLOBIN g/dL 13 2   HEMATOCRIT % 41 1   PLATELETS Thousands/uL 397*     Results from last 7 days   Lab Units 10/22/19  0527   POTASSIUM mmol/L 3 2*   CHLORIDE mmol/L 101   CO2 mmol/L 30   BUN mg/dL 11   CREATININE mg/dL 0 81   CALCIUM mg/dL 9 2

## 2019-10-22 NOTE — DISCHARGE INSTR - AVS FIRST PAGE
your pain medications from [facility] in [address]   Take Tylenol as instructed  Stay hydrated and follow your discharge diet progression   Mild nausea is ok as long as you can drink fluids  Take your omeprazole daily  Crush or cut your pills and open capsules, mix with liquid to drink    Follow up with Dr Arianna Cortes and your PCP within the next week

## 2019-10-22 NOTE — DISCHARGE SUMMARY
Discharge Summary - Brayan Davis 48 y o  female MRN: [id number]    Unit/Bed#: E5 -01 Encounter: [id number]      Pre-Operative Diagnosis: Pre-Op Diagnosis Codes:     * Morbid obesity ([facility] ) [E66 01]     * Type 2 diabetes mellitus with complication, unspecified whether long term insulin use [E11 8]     * Sleep apnea, unspecified type [G47 30]    Post-Operative Diagnosis: Post-Op Diagnosis Codes:     * Morbid obesity ([facility] ) [E66 01]     * Type 2 diabetes mellitus with complication, unspecified whether long term insulin use [E11 8]     * Sleep apnea, unspecified type [G47 30]    Procedures Performed:  Procedure(s):  LAPAROSCOPIC SLEEVE GASTRECTOMY AND INTRAOPERATIVE EGD    Surgeon: Rose Clark MD    See H & P for full details of admission and Operative Note for full details of operations performed  Patient tolerated surgery well without complications  In the morning postoperative Day 1, the patient had mild nausea and abdominal pain  Tolerated a clear liquid diet without vomiting  Able to ambulate and voiding independently  Patient was deemed ready for discharge home  Patient was seen and examined prior to discharge  Provisions for Follow-Up Care:  See After Visit Summary/Discharge Instructions for information related to follow-up care and home orders  Disposition: Home, in stable condition  Planned Readmission: No    Discharge Medications:  See After Visit Summary/Discharge Instructions for reconciled discharge medications provided to patient and family  Post Operative instructions: Reviewed with patient and/or family      Signature:   Genia Holland PA-C  Date: 10/22/2019 Time: 2:40 PM

## 2019-10-22 NOTE — PLAN OF CARE
Problem: Potential for Falls  Goal: Patient will remain free of falls  Description  INTERVENTIONS:  - Assess patient frequently for physical needs  -  Identify cognitive and physical deficits and behaviors that affect risk of falls    -  Gravity fall precautions as indicated by assessment   - Educate patient/family on patient safety including physical limitations  - Instruct patient to call for assistance with activity based on assessment  - Modify environment to reduce risk of injury  - Consider OT/PT consult to assist with strengthening/mobility  Outcome: Progressing     Problem: CHANGE IN BODY IMAGE  Goal: Pt/Family communicate acceptance of loss or change in body image and expresses psychological comfort and peace  Description  INTERVENTIONS:  - Assess patient/family anxiety and grief process related to change in body image, loss of functional status and loss of sense of self  - Assess patient/family's coping skills and provide emotional, spiritual and psychosocial support  - Provide information about the patient's health status with consideration of family and cultural values  - Communicate willingness to discuss loss and facilitate grief process with patient/family as appropriate  - Emphasize sustaining relationships within family system and community, or eileen/spiritual traditions  - Refer to community support groups as appropriate  - Initiate Spiritual Care, Pastoral care or other ancillary consults as needed  Outcome: Progressing     Problem: CARDIOVASCULAR - ADULT  Goal: Maintains optimal cardiac output and hemodynamic stability  Description  INTERVENTIONS:  - Monitor I/O, vital signs and rhythm  - Monitor for S/S and trends of decreased cardiac output  - Administer and titrate ordered vasoactive medications to optimize hemodynamic stability  - Assess quality of pulses, skin color and temperature  - Assess for signs of decreased coronary artery perfusion  - Instruct patient to report change in severity of symptoms  Outcome: Progressing  Goal: Absence of cardiac dysrhythmias or at baseline rhythm  Description  INTERVENTIONS:  - Continuous cardiac monitoring, vital signs, obtain 12 lead EKG if ordered  - Administer antiarrhythmic and heart rate control medications as ordered  - Monitor electrolytes and administer replacement therapy as ordered  Outcome: Progressing     Problem: RESPIRATORY - ADULT  Goal: Achieves optimal ventilation and oxygenation  Description  INTERVENTIONS:  - Assess for changes in respiratory status  - Assess for changes in mentation and behavior  - Position to facilitate oxygenation and minimize respiratory effort  - Oxygen administered by appropriate delivery if ordered  - Initiate smoking cessation education as indicated  - Encourage broncho-pulmonary hygiene including cough, deep breathe, Incentive Spirometry  - Assess the need for suctioning and aspirate as needed  - Assess and instruct to report SOB or any respiratory difficulty  - Respiratory Therapy support as indicated  Outcome: Progressing     Problem: GASTROINTESTINAL - ADULT  Goal: Minimal or absence of nausea and/or vomiting  Description  INTERVENTIONS:  - Administer IV fluids if ordered to ensure adequate hydration  - Maintain NPO status until nausea and vomiting are resolved  - Nasogastric tube if ordered  - Administer ordered antiemetic medications as needed  - Provide nonpharmacologic comfort measures as appropriate  - Advance diet as tolerated, if ordered  - Consider nutrition services referral to assist patient with adequate nutrition and appropriate food choices  Outcome: Progressing  Goal: Maintains or returns to baseline bowel function  Description  INTERVENTIONS:  - Assess bowel function  - Encourage oral fluids to ensure adequate hydration  - Administer IV fluids if ordered to ensure adequate hydration  - Administer ordered medications as needed  - Encourage mobilization and activity  - Consider nutritional services referral to assist patient with adequate nutrition and appropriate food choices  Outcome: Progressing  Goal: Maintains adequate nutritional intake  Description  INTERVENTIONS:  - Monitor percentage of each meal consumed  - Identify factors contributing to decreased intake, treat as appropriate  - Assist with meals as needed  - Monitor I&O, weight, and lab values if indicated  - Obtain nutrition services referral as needed  Outcome: Progressing     Problem: GENITOURINARY - ADULT  Goal: Maintains or returns to baseline urinary function  Description  INTERVENTIONS:  - Assess urinary function  - Encourage oral fluids to ensure adequate hydration if ordered  - Administer IV fluids as ordered to ensure adequate hydration  - Administer ordered medications as needed  - Offer frequent toileting  - Follow urinary retention protocol if ordered  Outcome: Progressing  Goal: Absence of urinary retention  Description  INTERVENTIONS:  - Assess patients ability to void and empty bladder  - Monitor I/O  - Bladder scan as needed  - Discuss with physician/AP medications to alleviate retention as needed  - Discuss catheterization for long term situations as appropriate  Outcome: Progressing     Problem: SKIN/TISSUE INTEGRITY - ADULT  Goal: Skin integrity remains intact  Description  INTERVENTIONS  - Identify patients at risk for skin breakdown  - Assess and monitor skin integrity  - Assess and monitor nutrition and hydration status  - Monitor labs (i e  albumin)  - Assess for incontinence   - Turn and reposition patient  - Assist with mobility/ambulation  - Relieve pressure over bony prominences  - Avoid friction and shearing  - Provide appropriate hygiene as needed including keeping skin clean and dry  - Evaluate need for skin moisturizer/barrier cream  - Collaborate with interdisciplinary team (i e  Nutrition, Rehabilitation, etc )   - Patient/family teaching  Outcome: Progressing  Goal: Incision(s), wounds(s) or drain site(s) healing without S/S of infection  Description  INTERVENTIONS  - Assess and document risk factors for skin impairment   - Assess and document dressing, incision, wound bed, drain sites and surrounding tissue  - Consider nutrition services referral as needed  - Oral mucous membranes remain intact  - Provide patient/ family education  Outcome: Progressing  Goal: Oral mucous membranes remain intact  Description  INTERVENTIONS  - Assess oral mucosa and hygiene practices  - Implement preventative oral hygiene regimen  - Implement oral medicated treatments as ordered  - Initiate Nutrition services referral as needed  Outcome: Progressing     Problem: HEMATOLOGIC - ADULT  Goal: Maintains hematologic stability  Description  INTERVENTIONS  - Assess for signs and symptoms of bleeding or hemorrhage  - Monitor labs  - Administer supportive blood products/factors as ordered and appropriate  Outcome: Progressing     Problem: MUSCULOSKELETAL - ADULT  Goal: Maintain or return mobility to safest level of function  Description  INTERVENTIONS:  - Assess patient's ability to carry out ADLs; assess patient's baseline for ADL function and identify physical deficits which impact ability to perform ADLs (bathing, care of mouth/teeth, toileting, grooming, dressing, etc )  - Assess/evaluate cause of self-care deficits   - Assess range of motion  - Assess patient's mobility  - Assess patient's need for assistive devices and provide as appropriate  - Encourage maximum independence but intervene and supervise when necessary  - Involve family in performance of ADLs  - Assess for home care needs following discharge   - Consider OT consult to assist with ADL evaluation and planning for discharge  - Provide patient education as appropriate  Outcome: Progressing  Goal: Maintain proper alignment of affected body part  Description  INTERVENTIONS:  - Support, maintain and protect limb and body alignment  - Provide patient/ family with appropriate education  Outcome: Progressing

## 2019-10-22 NOTE — PROGRESS NOTES
Progress Note - Bariatric Surgery   Nicol Isaac 48 y o  female MRN: [id number]  Unit/Bed#: E5 -01 Encounter: [id number]      Subjective/Objective     Subjective: Patient with morbid obesity s/p LAPAROSCOPIC SLEEVE GASTRECTOMY AND INTRAOPERATIVE EGD 10/21/2019 with Dr Mandy Calero  Tolerating liquid diet without nausea or vomiting, pain adequately controlled on oral pain medication, ambulating without assistance, voiding well, using incentive spirometer  Denies fevers, chills, sweats, SOB, CP, calf pain  Objective:    /69 (BP Location: Right arm)   Pulse 75   Temp (!) 97 °F (36 1 °C) (Temporal)   Resp 18   Ht 5' 4" (1 626 m)   Wt 113 kg (250 lb)   SpO2 96%   BMI 42 91 kg/m²       Intake/Output Summary (Last 24 hours) at 10/22/2019 0936  Last data filed at 10/22/2019 0308  Gross per 24 hour   Intake 2000 ml   Output 470 ml   Net 1530 ml       Invasive Devices     Peripheral Intravenous Line            Peripheral IV 10/21/19 Right Hand less than 1 day                ROS: 10-point system completed  All negative except see HPI  Physical Exam    General Appearance:    Alert, cooperative, no distress, appears stated age   Head:    Normocephalic, without obvious abnormality, atraumatic   Lungs:     Respirations unlabored   Heart:    Regular rate and rhythm   Abdomen:     Soft, appropriate tenderness,  no masses, no organomegaly,   non distended   Extremities:   Extremities normal, atraumatic, no cyanosis or edema   Neurologic:  Incision:  Psych:   Normal strength and sensation    Clean, dry, and intact    Normal mood and affect       Lab, Imaging and other studies:  I have personally reviewed pertinent lab results    , CBC:   Lab Results   Component Value Date    WBC 11 96 (H) 10/22/2019    HGB 13 2 10/22/2019    HCT 41 1 10/22/2019    MCV 90 10/22/2019     (H) 10/22/2019    MCH 29 0 10/22/2019    MCHC 32 1 10/22/2019    RDW 13 8 10/22/2019    MPV 9 1 10/22/2019        VTE Mechanical Prophylaxis: sequential compression device, Lovenox    Assessment/Plan  1)  Morbid Obesity s/p LAPAROSCOPIC SLEEVE GASTRECTOMY AND INTRAOPERATIVE EGD 10/21/2019 with stable post op course  Patient afebrile and hemodynamically stable  Encourage PO fluids, ambulation, and incentive spirometry  Plan of care was discussed with patient and patient's nurse  Care plan discussed with Dr Garry Ayala: Continue bariatric clear liquid diet, ambulation, incentive spirometry and if patient continues to improve clinically will discharge to home this afternoon

## 2019-10-22 NOTE — NURSING NOTE
Pt verbalized understanding to discharge instructions and medications  All questions answered  Pt left in stable condition with all belongings  Pt left by wheelchair per pt  Request due to pt knee issues  Pt left accompany by  and pca to private vehicle

## 2019-10-22 NOTE — DISCHARGE INSTRUCTIONS
Bariatric/Weight Loss Surgery  Hospital Discharge Instructions  1  ACTIVITY:  a  Progress as feels comfortable - a good rule is:  if you are doing something and it begins to hurt, stop doing the activity  Walk every hour while at home  b  [address] may walk stairs if you do so slowly  c  You may shower 48 hours after surgery  d  Use your incentive spirometer 10 times per hour while awake for 1 week  e  Do NOT drive for 48 hours after surgery  No driving 24 hours after taking certain prescription pain medications   Examples of such medication are Percocet, Darvocet, Oxycodone, Tylenol #3, and Tylenol with Codeine  Follow your pharmacists orders  2  DIET  a  Stay on a liquid diet for 7 days after your surgery date, sipping slowly  Refer to your manual for examples of choices  Remember to keep your liquids sugar free or low calorie  You may have protein drinks  Make sure to drink 48 to 64 ounces per day of fluids  b  [address] may advance to a pureed diet one week after surgery as instructed by your diet progression pamphlet  Once you get approval from your surgeon at your first post operative visit you may advance to the soft diet  3  MEDICATIONS:  a  The abdominal nerve block will wear off during the first 1-2 days that you are home, and you may become sore  Continue to take your Tylenol and your pain medication as instructed  b  Start vitamins and minerals when you get home  c  Anti-acid Medication as per prescription  d  Other medications as indicated on the Physician Patient Discharge Instructions form given to you at the time of discharge  e  Make sure that you are splitting your pill or tablet medications in halves or fourths or even crushing them before you take them  Capsules should be opened and mixed with water or jello  You need to do this for at least 4 weeks after surgery  Eventually you will be able to take your medications the regular way as they were prescribed     f  [address] will need to consult with your Family Doctor in regards to all your prescribed medication, particularly those for blood pressure and diabetes  As you lose weight, medical conditions may change, requiring an alteration or elimination of the drug dose  g  DO NOT TAKE BIRTH CONTROL(BC) MEDICATIONS, INSERT BC VAGINAL RINGS, OR PLACE IUD OR ANY OTHER BC METHODS UNTIL 31 DAYS FROM DAY OF DISCHARGE FROM HOSPITAL  THIS PLACES YOU AT HIGH RISK FOR A POTENTIALLY LIFE THREATENING BLOOD CLOT  Remember to always use barrier methods for birth control and speak to your GYN about using two forms of birth control to start 31 days after surgery  It is very important to avoid pregnancy until at least 18-24 months after surgery  4  INCISION CARE  a  You may shower and get incisions wet 2 days after surgery  No soaking tub baths or swimming for 30 days after surgery  Keep abdominal area and incisions clean  Use soap and water to create a good lather and rinse off  Do not scrub incisions  b  If you have a drain, empty the drain as the nurses instructed  5  FOLLOW-UP APPOINTMENT should be made for one week after discharge  Call surgeons office at [phone] to schedule an appointment      6  CALL YOUR DOCTOR FOR:  pain not controlled by pain medications, a temperature greater than 101 5° F, any increase or change in drainage or redness from any incision, any vomiting or inability to keep liquids down, shortness of breath, shoulder pain, or bleeding

## 2019-10-23 ENCOUNTER — TELEPHONE (OUTPATIENT)
Dept: BARIATRICS | Facility: CLINIC | Age: 54
End: 2019-10-23

## 2019-10-23 DIAGNOSIS — R11.0 NAUSEA: Primary | ICD-10-CM

## 2019-10-23 RX ORDER — METOCLOPRAMIDE 5 MG/1
5 TABLET ORAL 4 TIMES DAILY
Qty: 12 TABLET | Refills: 0 | Status: SHIPPED | OUTPATIENT
Start: 2019-10-23 | End: 2020-01-31 | Stop reason: ALTCHOICE

## 2019-10-23 NOTE — UTILIZATION REVIEW
Notification of Discharge  This is a Notification of Discharge from our facility [address]  Please be advised that this patient has been discharge from our facility  Below you will find the admission and discharge date and time including the patients disposition  PRESENTATION DATE: 10/21/2019  9:10 AM    IP ADMISSION DATE: 10/21/19 1338   DISCHARGE DATE: 10/22/2019  7:00 PM  DISPOSITION: Home/Self Care Home/Self Care   Admission Orders listed below:  Admission Orders (From admission, onward)     Ordered        10/21/19 1338  Inpatient Admission  Once                   Please contact the UR Department if additional information is required to close this patient's authorization/case  [facility] Util[facility] Review Department  Phone: [phone]; Fax [phone]  [email or web address]  ATTENTION: Please call with any questions or concerns to [phone]  and carefully listen to the prompts so that you are directed to the right person  Send all requests for admission clinical reviews, approved or denied determinations and any other requests to fax [phone]   All voicemails are confidential

## 2019-10-23 NOTE — TELEPHONE ENCOUNTER
Received patients short term disability/FMLA  packet - left message for patient as I must receive her $15 payment and ask questions pertaining to the form received

## 2019-10-23 NOTE — TELEPHONE ENCOUNTER
Patient called and complained of severe nausea with some dry heaves  She said it started last night after she got home from the hospital  She has been able to tolerate her liquids  She denies fever and SOB  She is able to ambulate  She has Zofran and she took it last night and this morning but says it is not helping  Spoke to the PA and she said to encourage patient to continue ambulating, use her IS, sipping her liquids, and she will send in Reglan to help with the nausea instead of the zofran  She said to inform the patient that nausea is normal after a sleeve and it will improve with time and walking and IS  I made patient aware of the advice  Told patient if symptoms change or get worse she should call the office back  Patient verbalized understanding

## 2019-10-23 NOTE — TELEPHONE ENCOUNTER
Post op follow up phone call completed  Pt is sipping liquids but having lots of nausea  Pt called office and Reglan was prescribed  Reviewed home rememdies for nausea ie arpan tea and aroma of rubbing alcohol  Using IS as instructed, reinforced importance of using IS to help prevent pneumonia  Ambulating about home without difficulty  Pain controlled with analgesia  Reaffirmed examples of liquid diet over the next week  Pt stated understanding about discharge instructions and medication adjustments  Pt has not taken Lovenox injections yet today  Reminded to take Lovenox daily and should be as close to q24h as possible  Pt stated she had in hospital yesterday at 12pm   Follow up appt with surgeon scheduled for next week  Instructed to call with any additional questions or concerns

## 2019-10-31 ENCOUNTER — OFFICE VISIT (OUTPATIENT)
Dept: BARIATRICS | Facility: CLINIC | Age: 54
End: 2019-10-31

## 2019-10-31 VITALS
TEMPERATURE: 97.9 F | HEART RATE: 80 BPM | SYSTOLIC BLOOD PRESSURE: 132 MMHG | WEIGHT: 240 LBS | BODY MASS INDEX: 40.97 KG/M2 | HEIGHT: 64 IN | DIASTOLIC BLOOD PRESSURE: 80 MMHG

## 2019-10-31 DIAGNOSIS — Z98.84 BARIATRIC SURGERY STATUS: ICD-10-CM

## 2019-10-31 DIAGNOSIS — E66.01 OBESITY, CLASS III, BMI 40-49.9 (MORBID OBESITY) (HCC): ICD-10-CM

## 2019-10-31 DIAGNOSIS — Z98.84 BARIATRIC SURGERY STATUS: Primary | ICD-10-CM

## 2019-10-31 DIAGNOSIS — Z99.89 OBSTRUCTIVE SLEEP APNEA TREATED WITH CONTINUOUS POSITIVE AIRWAY PRESSURE (CPAP): ICD-10-CM

## 2019-10-31 DIAGNOSIS — E11.9 DIABETES MELLITUS, NEW ONSET (HCC): Primary | ICD-10-CM

## 2019-10-31 DIAGNOSIS — M25.562 CHRONIC PAIN OF BOTH KNEES: ICD-10-CM

## 2019-10-31 DIAGNOSIS — M25.561 CHRONIC PAIN OF BOTH KNEES: ICD-10-CM

## 2019-10-31 DIAGNOSIS — G89.29 CHRONIC PAIN OF BOTH KNEES: ICD-10-CM

## 2019-10-31 DIAGNOSIS — G47.33 OBSTRUCTIVE SLEEP APNEA TREATED WITH CONTINUOUS POSITIVE AIRWAY PRESSURE (CPAP): ICD-10-CM

## 2019-10-31 PROCEDURE — 99024 POSTOP FOLLOW-UP VISIT: CPT | Performed by: SURGERY

## 2019-10-31 PROCEDURE — RECHECK: Performed by: DIETITIAN, REGISTERED

## 2019-10-31 NOTE — PROGRESS NOTES
POST OP UP VISIT - BARIATRIC SURGERY  Sharri East 48 y o  female MRN: [id number]  Unit/Bed#:  Encounter: [id number]      HPI:  Sharri East is a 48 y o  female status post laparoscopic sleeve gastrectomy on 10/21/2019  She was originally scheduled for Liset-en-Y gastric bypass but due to severe adhesive disease secondary to her previous  and hysterectomy, we elected instead to proceed with a sleeve gastrectomy    Subjective     Patient reports doing well  Immediately postop after her hospital discharge the patient did struggle with severe nausea refractory to Zofran treatment  Reglan was called in for her which managed to control her symptoms  She now reports she is tolerating a pureed diet very well  Incisional pain is adequately controlled without the use of pain medication  Ambulation is impaired secondary to severe bilateral knee OA  The patient uses a left-sided crutch  PPI and lovenox are being taken as instructed  Review of Systems   Constitutional: Negative  HENT: Negative  Eyes: Negative  Respiratory: Negative  Cardiovascular: Negative  Gastrointestinal: Negative  Endocrine: Negative  Genitourinary: Negative  Musculoskeletal: Negative  Skin: Negative  Allergic/Immunologic: Negative  Neurological: Negative  Hematological: Negative  Psychiatric/Behavioral: Negative          Historical Information   Past Medical History:   Diagnosis Date    Acute bronchitis     Anxiety     Arthritis     Asthma     Bariatric surgery status     Continuous positive airway pressure dependence     CPAP (continuous positive airway pressure) dependence     Depression     Diabetes mellitus     GERD (gastroesophageal reflux disease)     Hyperlipidemia     Hypertension     Morbid obesity     Postsurgical malabsorption     Sleep apnea     USES C PAP     Past Surgical History:   Procedure Laterality Date    BREAST SURGERY      bilat lumpectomy     SECTION twice    CHOLECYSTECTOMY      EGD      HYSTERECTOMY      KNEE ARTHROSCOPY Bilateral     DC LAP, CLARE RESTRICT PROC, LONGITUDINAL GASTRECTOMY N/A 10/21/2019    Procedure: LAPAROSCOPIC SLEEVE GASTRECTOMY AND INTRAOPERATIVE EGD;  Surgeon: Fede Rogers MD;  Location: [facility];  Service: [address] [place] [address]       Social History   Social History     Substance and Sexual Activity   Alcohol Use Not Currently    Frequency: Monthly or less    Comment: occasional     Social History     Substance and Sexual Activity   Drug Use No     Social History     Tobacco Use   Smoking Status Never Smoker   Smokeless Tobacco Never Used       Objective       Current Vitals:   Blood Pressure: 132/80 (10/31/19 0922)  Pulse: 80 (10/31/19 0922)  Temperature: 97 9 °F (36 6 °C) (10/31/19 0922)  Temp Source: Tympanic (10/31/19 0922)  Height: 5' 4" (162 6 cm) (10/31/19 9942)  Weight - Scale: 109 kg (240 lb) (10/31/19 0922)    Invasive Devices     None                 Physical Exam   Constitutional: She is oriented to person, place, and time  She appears well-developed and well-nourished  HENT:   Head: Normocephalic and atraumatic  Nose: Nose normal    Eyes: Conjunctivae and EOM are normal    Neck: Normal range of motion  Cardiovascular: Normal rate  Pulmonary/Chest: Effort normal    Abdominal: Soft  Bowel sounds are normal  She exhibits no distension and no mass  There is no tenderness  There is no rebound and no guarding  No hernia  The abdomen is benign  All incisions are healing well  There are no underlying palpable incisional hernias  Musculoskeletal: Normal range of motion  Neurological: She is alert and oriented to person, place, and time  Skin: Skin is warm  Psychiatric: She has a normal mood and affect  Her behavior is normal  Judgment and thought content normal          Pathology, and Other Studies: I have personally reviewed pertinent reports          Assessment/PLAN:    [employer] is a 48 y o  female status post laparoscopic sleeve gastrectomy on 10/21/2019  She was originally scheduled for Liset-en-Y gastric bypass but due to severe adhesive disease secondary to her previous  and hysterectomy, we elected instead to proceed with a sleeve gastrectomy    Doing well post op  No major issues  Pathology reviewed and discussed with patient  Negative for abnormal findings  The patient may now increase physical activity as tolerated with no postop restrictions  Diet will be advanced today as instructed by our dietitians and the patient binder  She is still to wait an extra week for immersion baths  Follow up in 1 month as scheduled                Mariusz Parekh MD  Bariatric Surgery Fellow  10/31/2019  9:49 AM

## 2019-10-31 NOTE — PROGRESS NOTES
Weight Management Nutrition Class     Diagnosis: Morbid Obesity    Bariatric Surgeon: Dr Billy Vila    Surgery: Vertical Sleeve Gastrectomy    Class: first post op note    Topics discussed today include:     fluid goals post op, protein goals post op, constipation, chew food well, exercise, avoidance of alcohol, PPI use, diet progression, dumping syndrome, protein supplems, vitamin/mineral supplements, calcium supplements, additional vitamin B12, iron supplements, fat soluble vitamins  and provided with sample of premier protein powder     Patient was able to verbalize basic diet (protein, fluid, vitamin and mineral) recommendations and possible nutrition-related complications   Yes

## 2019-11-20 NOTE — PROGRESS NOTES
PT Evaluation     Today's date: 2019  Patient name: Maksim Martinez  : 1965  MRN: [id number]  Referring provider: Fariba Fontaine MD  Dx:   Encounter Diagnosis     ICD-10-CM    1  Bilateral primary osteoarthritis of knee M17 0                   Assessment  Assessment details: Maksim Martinez is a 48 y o  female with a history of anxiety, arthritis, asthma, COPD, sleep apnea, depression, DM, GERD, hyperlipidemia, and morbid obesity that presents for a high complexity physical therapy initial evaluation  The patient demonstrates signs and symptoms consistent with bilateral primary knee osteoarthritis  During the examination the patient demonstrated decreased lower extremity strength, decreased knee ROM, gait dysfunction, activity intolerance, impaired proprioception, and knee pain  The patient's impairments are causing the following functional limitations: difficulty with prolonged standing, prolonged walking, difficulty, unable to go shopping, difficulty squatting/kneeling, difficulty stair-climbing, difficulty with ADL's, and difficulty transferring from low surfaces  The patient's clinical presentation is unstable due to patient's significant medical history and her significant functional limitation ( FOTO 26% function)  The patient will benefit from skilled PT services to address impairments, work towards goals, and restore PLOF        Impairments: abnormal gait, abnormal or restricted ROM, activity intolerance, impaired balance, impaired physical strength, lacks appropriate home exercise program and pain with function  Functional limitations: difficulty with prolonged standing, prolonged walking, unable to go shopping, difficulty squatting/kneeling, difficulty stair-climbing, difficulty with ADL's, and difficulty transferring from low surfaces  Symptom irritability: moderateBarriers to therapy: significant medical history  Understanding of Dx/Px/POC: good   Prognosis: good  Prognosis details: Decreased due to significant medical history    Goals  STG: Achieve in 4 weeks  1  Patient's B/L knee pain at worst less than 4/10 to allow for proper gait without crutches  2   Patient's B/L knee ROM improve by 10-15 degrees to improve ambulating up/down the steps  3   LE MMT improve to > 4-/5 all motions tested to improve ADL/recreational activities  4  Timed up and go score improve to less than 14 seconds  LTG:  Achieve in 4-6 weeks  1  Patient's knee FOTO score improve by 25% from last assessment  2   Patient achieve personal goal of walking without B/L crutches and walk up/down the steps more normally without knee pain > 4/10   3  Patient to achieve independence with home exercise plan  4  30 second sit to stand test score improve by 5 stands to a total of 10 stands to indicate improved transfers  Plan  Patient would benefit from: skilled physical therapy  Planned modality interventions: thermotherapy: hydrocollator packs, cryotherapy, unattended electrical stimulation and electrical stimulation/Russian stimulation  Other planned modality interventions: [facility]  Planned therapy interventions: joint mobilization, manual therapy, massage, neuromuscular re-education, patient education, postural training, self care, strengthening, stretching, therapeutic activities, therapeutic exercise, therapeutic training, transfer training, home exercise program, gait training, balance, balance/weight bearing training and abdominal trunk stabilization  Frequency: 2-3x/wk  Duration in weeks: 8  Plan of Care beginning date: 11/21/2019  Plan of Care expiration date: 1/16/2020  Treatment plan discussed with: PTA and patient        Subjective Evaluation    History of Present Illness  Date of onset: 9/7/2016  Mechanism of injury: Mikaela Marques is a 48 y o  female that presents to outpatient physical therapy with complaints of pain and lacking ROM at her B/L knees R>L    The patient notes difficulty bearing weight with her R LE  - thus requiring axillary crutches for all ambulation  The patient reports in 2016 she was diagnosed with right knee mensicus tears which she received a knee scope and a lateral release  After that surgery the patient had difficulty ambulating and feels she never fully recovered afterwards  The patient notes her B/L knees have gotten worse over the past 3 years most notably the past 2 months  The patient has been receiving multiple cortisone shots over this span of time  The patient consulted with Dr Delmi Leo who ordered outpatient PT to decrease pain and improve the patient's function  The patient recently underwent weight loss surgery - the patient had to stop taking Voltaren gel and was immobilized which did exacerbate the patient's knee pain  The patient is now allowed to resume the Voltaren gel with PPI med  The patient's main goal for physical therapy is to walk without crutches and walk up and down the steps more normally ( difficulty with L knee)       Past surgical History: 2016 hx of R knee scope with lateral release          Recurrent probem    Pain  Current pain ratin  At best pain rating: 3  At worst pain rating: 10  Location: R anterio knee joint line, R superior patella, R distal IT band; L anterior knee  Quality: sharp and dull ache  Relieving factors: rest, medications and ice  Aggravating factors: standing, walking, stair climbing and running  Progression: worsening    Social Support  Steps to enter house: yes  5  Stairs in house: yes   13  Lives in: multiple-level home  Lives with: spouse and young children    Employment status: working ( - works from home)  Hand dominance: left    Treatments  Previous treatment: medication  Current treatment: physical therapy  Patient Goals  Patient goals for therapy: decreased pain, improved balance, increased motion, increased strength, independence with ADLs/IADLs, return to sport/leisure activities and return to work  Patient goal: walk up down the steps more normally and walk without crutches        Objective     Palpation     Additional Palpation Details  No muscle tenderness    Tenderness     Right Knee   Tenderness in the inferior patella and lateral joint line  Neurological Testing     Sensation     Knee   Left Knee   Intact: light touch    Right Knee   Intact: light touch     Active Range of Motion   Left Knee   Flexion: 108 degrees   Extension: -7 degrees with pain    Right Knee   Flexion: 116 degrees   Extension: -20 degrees with pain    Passive Range of Motion   Left Knee   Flexion: 110 degrees   Extension: -6 degrees with pain    Right Knee   Flexion: 117 degrees   Extension: -18 degrees with pain    Mobility   Patellar Mobility:   Left Knee   Hypomobile: left medial, left lateral, left superior and left inferior    Right Knee   Hypomobile: medial, lateral, superior and inferior     Strength/Myotome Testing     Left Hip   Planes of Motion   Flexion: 4-  Extension: 4-  Abduction: 4-  Adduction: 4-    Right Hip   Planes of Motion   Flexion: 3+  Extension: 3+  Abduction: 3+  Adduction: 3+    Left Knee   Flexion: 3+  Extension: 3+  Quadriceps contraction: poor    Right Knee   Flexion: 3  Extension: 3-  Quadriceps contraction: poor    Tests     Additional Tests Details  FOTO: 26% function    Gait Impairments:   The patient is ambulating with B/L axillary crutches with slow gait speed, forward trunk flexion, decreased WB with R LE, no R LE heel strike, Unable to fully extend R LE decreased B/L step lengths, and decreased heel-toe rollover    Timed Up and Go: 27 seconds with B/L axillary crutches    30 second sit to stand test: 5 stands using B/L UE    SLB: Unable to assume SLB safely    Swelling     Left Knee Girth Measurement (cm)   Joint line: 41 cm    Right Knee Girth Measurement (cm)   Joint line: 42 5 cm               Re-Evaluation: 12/19/19  PRECAUTIONS: DM/ ADHESIVE ALLERGY  Knee Specialty Daily Treatment Diary     Manual  11/21/19       PROM flex/ext  * Gentle EXT      Hamstring stretch  *                      Patellar mobs  *      Knee stretch on edge of mat  *          Exercise Diary  11/21/19       Nu Step S=9 L2 x5 mins       Biodex Bike                        Heel slides flex/abd  *      Bridges  *      EXT BOARD        Clam shells  *      QS roll @ knee R: x5 p! L: x10       SLR all planes        SAQ  *? LAQ  *      Hamstring stretch 3x:30 B/L       Calf stretch 3x:30 B/L       Standing hamstring curls  *      TKE        Total gym squats (deep)        Tandem Stand  *      Step up        [facility]        sidestepping  *// bars      Heel-toe amb                Mirror Amb with SPC  *      Up/Down Steps with SOS  *          Modalities 11/21/19       CP B/L KNEE declined                               The patient was given a new home exercise plan with written handout, pictures, and verbal instruction  The patient accepts and understands the new home activities

## 2019-11-21 ENCOUNTER — TRANSCRIBE ORDERS (OUTPATIENT)
Dept: PHYSICAL THERAPY | Facility: CLINIC | Age: 54
End: 2019-11-21

## 2019-11-21 ENCOUNTER — EVALUATION (OUTPATIENT)
Dept: PHYSICAL THERAPY | Facility: CLINIC | Age: 54
End: 2019-11-21
Payer: COMMERCIAL

## 2019-11-21 DIAGNOSIS — M17.0 BILATERAL PRIMARY OSTEOARTHRITIS OF KNEE: Primary | ICD-10-CM

## 2019-11-21 PROCEDURE — 97110 THERAPEUTIC EXERCISES: CPT | Performed by: PHYSICAL THERAPIST

## 2019-11-21 PROCEDURE — 97535 SELF CARE MNGMENT TRAINING: CPT | Performed by: PHYSICAL THERAPIST

## 2019-11-21 PROCEDURE — 97163 PT EVAL HIGH COMPLEX 45 MIN: CPT | Performed by: PHYSICAL THERAPIST

## 2019-11-21 NOTE — LETTER
[id number] [place] [id number]    Patient: Long Preciado   YOB: 1965   Date of Visit: 2019     Encounter Diagnosis     ICD-10-CM    1  Bilateral primary osteoarthritis of knee M17 0        Dear Dr Cameron Godfrey: Thank you for your recent referral of Long Preciado  Please review the attached evaluation summary from Rosita's recent visit  Please verify that you agree with the plan of care by signing the attached order  If you have any questions or concerns, please do not hesitate to call  I sincerely appreciate the opportunity to share in the care of one of your patients and hope to have another opportunity to work with you in the near future  Sincerely,    Laury Landon, PT      Referring Provider:      I certify that I have read the below Plan of Care and certify the need for these services furnished under this plan of treatment while under my care  [address] [id number]  VIA Facsimile: [phone]          PT Evaluation     Today's date: 2019  Patient name: Long Preciado  : 1965  MRN: [id number]  Referring provider: Della Magaña MD  Dx:   Encounter Diagnosis     ICD-10-CM    1  Bilateral primary osteoarthritis of knee M17 0                   Assessment  Assessment details: Long Preciado is a 48 y o  female with a history of anxiety, arthritis, asthma, COPD, sleep apnea, depression, DM, GERD, hyperlipidemia, and morbid obesity that presents for a high complexity physical therapy initial evaluation  The patient demonstrates signs and symptoms consistent with bilateral primary knee osteoarthritis  During the examination the patient demonstrated decreased lower extremity strength, decreased knee ROM, gait dysfunction, activity intolerance, impaired proprioception, and knee pain    The patient's impairments are causing the following functional limitations: difficulty with prolonged standing, prolonged walking, difficulty, unable to go shopping, difficulty squatting/kneeling, difficulty stair-climbing, difficulty with ADL's, and difficulty transferring from low surfaces  The patient's clinical presentation is unstable due to patient's significant medical history and her significant functional limitation ( FOTO 26% function)  The patient will benefit from skilled PT services to address impairments, work towards goals, and restore PLOF  Impairments: abnormal gait, abnormal or restricted ROM, activity intolerance, impaired balance, impaired physical strength, lacks appropriate home exercise program and pain with function  Functional limitations: difficulty with prolonged standing, prolonged walking, unable to go shopping, difficulty squatting/kneeling, difficulty stair-climbing, difficulty with ADL's, and difficulty transferring from low surfaces  Symptom irritability: moderateBarriers to therapy: significant medical history  Understanding of Dx/Px/POC: good   Prognosis: good  Prognosis details: Decreased due to significant medical history    Goals  STG: Achieve in 4 weeks  1  Patient's B/L knee pain at worst less than 4/10 to allow for proper gait without crutches  2   Patient's B/L knee ROM improve by 10-15 degrees to improve ambulating up/down the steps  3   LE MMT improve to > 4-/5 all motions tested to improve ADL/recreational activities  4  Timed up and go score improve to less than 14 seconds  LTG:  Achieve in 4-6 weeks  1  Patient's knee FOTO score improve by 25% from last assessment  2   Patient achieve personal goal of walking without B/L crutches and walk up/down the steps more normally without knee pain > 4/10   3  Patient to achieve independence with home exercise plan  4  30 second sit to stand test score improve by 5 stands to a total of 10 stands to indicate improved transfers        Plan  Patient would benefit from: skilled physical therapy  Planned modality interventions: thermotherapy: hydrocollator packs, cryotherapy, unattended electrical stimulation and electrical stimulation/Russian stimulation  Other planned modality interventions: [facility]  Planned therapy interventions: joint mobilization, manual therapy, massage, neuromuscular re-education, patient education, postural training, self care, strengthening, stretching, therapeutic activities, therapeutic exercise, therapeutic training, transfer training, home exercise program, gait training, balance, balance/weight bearing training and abdominal trunk stabilization  Frequency: 2-3x/wk  Duration in weeks: 8  Plan of Care beginning date: 11/21/2019  Plan of Care expiration date: 1/16/2020  Treatment plan discussed with: PTA and patient        Subjective Evaluation    History of Present Illness  Date of onset: 9/7/2016  Mechanism of injury: Ana Golden is a 48 y o  female that presents to outpatient physical therapy with complaints of pain and lacking ROM at her B/L knees R>L  The patient notes difficulty bearing weight with her R LE  - thus requiring axillary crutches for all ambulation  The patient reports in 2016 she was diagnosed with right knee mensicus tears which she received a knee scope and a lateral release  After that surgery the patient had difficulty ambulating and feels she never fully recovered afterwards  The patient notes her B/L knees have gotten worse over the past 3 years most notably the past 2 months  The patient has been receiving multiple cortisone shots over this span of time  The patient consulted with Dr Rachna Pate who ordered outpatient PT to decrease pain and improve the patient's function  The patient recently underwent weight loss surgery - the patient had to stop taking Voltaren gel and was immobilized which did exacerbate the patient's knee pain  The patient is now allowed to resume the Voltaren gel with PPI med    The patient's main goal for physical therapy is to walk without crutches and walk up and down the steps more normally ( difficulty with L knee)  Past surgical History: 2016 hx of R knee scope with lateral release          Recurrent probem    Pain  Current pain ratin  At best pain rating: 3  At worst pain rating: 10  Location: R anterio knee joint line, R superior patella, R distal IT band; L anterior knee  Quality: sharp and dull ache  Relieving factors: rest, medications and ice  Aggravating factors: standing, walking, stair climbing and running  Progression: worsening    Social Support  Steps to enter house: yes  5  Stairs in house: yes   13  Lives in: multiple-level home  Lives with: spouse and young children    Employment status: working ( - works from home)  Hand dominance: left    Treatments  Previous treatment: medication  Current treatment: physical therapy  Patient Goals  Patient goals for therapy: decreased pain, improved balance, increased motion, increased strength, independence with ADLs/IADLs, return to sport/leisure activities and return to work  Patient goal: walk up down the steps more normally and walk without crutches        Objective     Palpation     Additional Palpation Details  No muscle tenderness    Tenderness     Right Knee   Tenderness in the inferior patella and lateral joint line       Neurological Testing     Sensation     Knee   Left Knee   Intact: light touch    Right Knee   Intact: light touch     Active Range of Motion   Left Knee   Flexion: 108 degrees   Extension: -7 degrees with pain    Right Knee   Flexion: 116 degrees   Extension: -20 degrees with pain    Passive Range of Motion   Left Knee   Flexion: 110 degrees   Extension: -6 degrees with pain    Right Knee   Flexion: 117 degrees   Extension: -18 degrees with pain    Mobility   Patellar Mobility:   Left Knee   Hypomobile: left medial, left lateral, left superior and left inferior    Right Knee   Hypomobile: medial, lateral, superior and inferior     Strength/Myotome Testing     Left Hip   Planes of Motion   Flexion: 4-  Extension: 4-  Abduction: 4-  Adduction: 4-    Right Hip   Planes of Motion   Flexion: 3+  Extension: 3+  Abduction: 3+  Adduction: 3+    Left Knee   Flexion: 3+  Extension: 3+  Quadriceps contraction: poor    Right Knee   Flexion: 3  Extension: 3-  Quadriceps contraction: poor    Tests     Additional Tests Details  FOTO: 26% function    Gait Impairments: The patient is ambulating with B/L axillary crutches with slow gait speed, forward trunk flexion, decreased WB with R LE, no R LE heel strike, Unable to fully extend R LE decreased B/L step lengths, and decreased heel-toe rollover    Timed Up and Go: 27 seconds with B/L axillary crutches    30 second sit to stand test: 5 stands using B/L UE    SLB: Unable to assume SLB safely    Swelling     Left Knee Girth Measurement (cm)   Joint line: 41 cm    Right Knee Girth Measurement (cm)   Joint line: 42 5 cm               Re-Evaluation: 12/19/19  PRECAUTIONS: DM/ ADHESIVE ALLERGY  Knee Specialty Daily Treatment Diary     Manual  11/21/19       PROM flex/ext  * Gentle EXT      Hamstring stretch  *                      Patellar mobs  *      Knee stretch on edge of mat  *          Exercise Diary  11/21/19       Nu Step S=9 L2 x5 mins       Biodex Bike                        Heel slides flex/abd  *      Bridges  *      EXT BOARD        Clam shells  *      QS roll @ knee R: x5 p! L: x10       SLR all planes        SAQ  *?       LAQ  *      Hamstring stretch 3x:30 B/L       Calf stretch 3x:30 B/L       Standing hamstring curls  *      TKE        Total gym squats (deep)        Tandem Stand  *      Step up        [address] [facility]        sidestepping  *// bars      Heel-toe amb                Mirror Amb with SPC  *      Up/Down Steps with SOS  *          Modalities 11/21/19       CP B/L KNEE declined                               The patient was given a new home exercise plan with written handout, pictures, and verbal instruction  The patient accepts and understands the new home activities

## 2019-11-26 ENCOUNTER — OFFICE VISIT (OUTPATIENT)
Dept: PHYSICAL THERAPY | Facility: CLINIC | Age: 54
End: 2019-11-26
Payer: COMMERCIAL

## 2019-11-26 DIAGNOSIS — M17.0 BILATERAL PRIMARY OSTEOARTHRITIS OF KNEE: Primary | ICD-10-CM

## 2019-11-26 PROCEDURE — 97140 MANUAL THERAPY 1/> REGIONS: CPT | Performed by: PHYSICAL THERAPIST

## 2019-11-26 PROCEDURE — 97110 THERAPEUTIC EXERCISES: CPT | Performed by: PHYSICAL THERAPIST

## 2019-11-26 PROCEDURE — 97535 SELF CARE MNGMENT TRAINING: CPT | Performed by: PHYSICAL THERAPIST

## 2019-11-26 NOTE — PROGRESS NOTES
Daily Note     Today's date: 2019  Patient name: Yolonda Brunner  : 1965  MRN: [id number]  Referring provider: Corby Frazier MD  Dx:   Encounter Diagnosis     ICD-10-CM    1  Bilateral primary osteoarthritis of knee M17 0                   Subjective: The patient notes improvement with the performance of her exercises  She presents to therapy with 1 crutch versus 2 with improved gait mechanics  The patient notes 4/10 pain at her B/L knees with walking and 0/10 pain at rest       Objective: See treatment diary below      Assessment:  The patient tolerated all activities well today  The patient needed verbal and manual cues for proper posture and technique to perform the exercises properly  There were complaints of mild soreness at her B/L knees after the session today  The patient wanted to ice her knees at home  The patient will benefit from continued skilled physical therapy to progress towards achieving patient centered goals  Plan: Continue per plan of care  Progress treatment as tolerated  Re-Evaluation: 19  PRECAUTIONS: DM/ ADHESIVE ALLERGY  Knee Specialty Daily Treatment Diary     Manual  19      PROM flex/ext  * Gentle EXT 3x:15( R w/ distraction)  Flex 3x:15 B/L      Hamstring stretch  *3x:30 B/L                      Patellar mobs  *10x:05 with self instruction      Knee stretch on edge of mat  *EXT only    x1 min each          Exercise Diary  19      Nu Step S=10 L2 x5 mins L2 8 mins      Biodex Bike                        Heel slides flex/abd  *FLEX / QS combo x10 B/L  ABD: x10B/L      Bridges  *10x:02      EXT BOARD        Clam shells  *x10 :05      QS roll @ knee R: x5 p! L: x10       SLR all planes        SAQ  *?       LAQ  *      Hamstring stretch 3x:30 B/L       Calf stretch 3x:30 B/L       Standing hamstring curls  *      TKE        Total gym squats (deep)        Tandem Stand  *      Step up        [id number] [employer] [facility]        sidestepping  *// bars      Heel-toe amb                Mirror Amb with SPC  *      Up/Down Steps with SOS  *          Modalities 11/21/19 11/26/19      CP B/L KNEE declined declined                            The patient was given a new home exercise plan with written handout, pictures, and verbal instruction  The patient accepts and understands the new home activities

## 2019-11-29 ENCOUNTER — OFFICE VISIT (OUTPATIENT)
Dept: PHYSICAL THERAPY | Facility: CLINIC | Age: 54
End: 2019-11-29
Payer: COMMERCIAL

## 2019-11-29 DIAGNOSIS — M17.0 BILATERAL PRIMARY OSTEOARTHRITIS OF KNEE: Primary | ICD-10-CM

## 2019-11-29 PROCEDURE — 97140 MANUAL THERAPY 1/> REGIONS: CPT

## 2019-11-29 PROCEDURE — 97110 THERAPEUTIC EXERCISES: CPT

## 2019-11-29 NOTE — PROGRESS NOTES
Daily Note     Today's date: 2019  Patient name: Gary Reeder  : 1965  MRN: [id number]  Referring provider: Shahnaz Veliz MD  Dx:   Encounter Diagnosis     ICD-10-CM    1  Bilateral primary osteoarthritis of knee M17 0                   Subjective: Patient had pain on Tuesday after therapy that was achy and lasted into Wednesday  She had one bout of sharp pain in right knee joint on Wednesday that lasted for a short period of time  Today she has no pain with sitting and 4/10 with walking  Objective: See treatment diary below    Patient's home exercise program updated to include additional exercises  Handout issued and explained  Assessment: Tolerated treatment well  Patient would benefit from continued PT for stretching and strengthening  Patient was very talkative and all questions answered  Patient completed exercises with little difficulty and less pain as program progressed  Patient has slight fear on steps when out of her normal pattern  She felt the back extensions felt "great"  Patient likes the new exercises and is excited she was able to perform more then she had been  Patient understood the difference between working to stretch discomfort and true pain with exercises  Patient understood all education on new exercises  She felt standing pain at 3/10 when she left department  Plan: Continue per plan of care  Progress treatment as tolerated         Re-Evaluation: 19  PRECAUTIONS: DM/ ADHESIVE ALLERGY  Knee Specialty Daily Treatment Diary     Manual  19     PROM flex/ext  * Gentle EXT 3x:15( R w/ distraction)  Flex 3x:15 B/L Gentle EXT 3x:15( R w/ distraction)  Flex 3x:15 B/L     Hamstring stretch  *3x:30 B/L :30x3 B/L                     Patellar mobs  *10x:05 with self instruction reviewed     Knee stretch on edge of mat  *EXT only    x1 min each EXT only    x1 min each         Exercise Diary  19     Nu Step S=10 L2 x5 mins L2 8 mins L2 x13 min     Biodex Bike                        Heel slides flex/abd  *FLEX / QS combo x10 B/L  ABD: x10B/L FLEX / QS combo x10 B/L  ABD: x10B/L     Bridges  *10x:02 :02x15     EXT BOARD        Clam shells  *x10 :05 :05x10     QS roll @ knee R: x5 p!   L: x10       SLR all planes        SAQ  *? x10ea     LAQ  * x10     Hamstring stretch 3x:30 B/L  :30x3     Calf stretch 3x:30 B/L  Wedge :20x3     Standing hamstring curls  * x10ea     TKE        Total gym squats (deep)        Tandem Stand  * :15x2     Step up        [address] [facility]        sidestepping  *// bars // bars 10ftx2     Heel-toe amb        Back ext   :05 x10     Mirror Amb with SPC  *  *    Up/Down Steps with SOS  * x3         Modalities 11/21/19 11/26/19 11/29/19     CP B/L KNEE declined declined

## 2019-12-02 ENCOUNTER — APPOINTMENT (OUTPATIENT)
Dept: PHYSICAL THERAPY | Facility: CLINIC | Age: 54
End: 2019-12-02
Payer: COMMERCIAL

## 2019-12-02 NOTE — PROGRESS NOTES
Daily Note     Today's date: 2019  Patient name: Sisi Nicolas  : 1965  MRN: [id number]  Referring provider: Rosa Maria Rodriguez MD  Dx:   Encounter Diagnosis     ICD-10-CM    1  Bilateral primary osteoarthritis of knee M17 0                   Subjective: ***      Objective: See treatment diary below      Assessment: Tolerated treatment {Tolerated treatment :[id number]}  Patient {assessment:[id number]}      Plan: {PLAN:2170617320}     Re-Evaluation: 19  PRECAUTIONS: DM/ ADHESIVE ALLERGY  Knee Specialty Daily Treatment Diary     Manual  19     PROM flex/ext  * Gentle EXT 3x:15( R w/ distraction)  Flex 3x:15 B/L Gentle EXT 3x:15( R w/ distraction)  Flex 3x:15 B/L     Hamstring stretch  *3x:30 B/L :30x3 B/L                     Patellar mobs  *10x:05 with self instruction reviewed     Knee stretch on edge of mat  *EXT only    x1 min each EXT only    x1 min each         Exercise Diary  19     Nu Step S=10 L2 x5 mins L2 8 mins L2 x13 min     Biodex Bike                        Heel slides flex/abd  *FLEX / QS combo x10 B/L  ABD: x10B/L FLEX / QS combo x10 B/L  ABD: x10B/L     Bridges  *10x:02 :02x15     EXT BOARD        Clam shells  *x10 :05 :05x10     QS roll @ knee R: x5 p!   L: x10       SLR all planes        SAQ  *? x10ea     LAQ  * x10     Hamstring stretch 3x:30 B/L  :30x3     Calf stretch 3x:30 B/L  Wedge :20x3     Standing hamstring curls  * x10ea     TKE        Total gym squats (deep)        Tandem Stand  * :15x2     Step up        [facility]        sidestepping  *// bars // bars 10ftx2     Heel-toe amb        Back ext   :05 x10     Mirror Amb with SPC  *  *    Up/Down Steps with SOS  * x3         Modalities 19     CP B/L KNEE declined declined

## 2019-12-03 ENCOUNTER — OFFICE VISIT (OUTPATIENT)
Dept: PHYSICAL THERAPY | Facility: CLINIC | Age: 54
End: 2019-12-03
Payer: COMMERCIAL

## 2019-12-03 DIAGNOSIS — M17.0 BILATERAL PRIMARY OSTEOARTHRITIS OF KNEE: Primary | ICD-10-CM

## 2019-12-03 PROCEDURE — 97140 MANUAL THERAPY 1/> REGIONS: CPT | Performed by: PHYSICAL THERAPIST

## 2019-12-03 PROCEDURE — 97116 GAIT TRAINING THERAPY: CPT | Performed by: PHYSICAL THERAPIST

## 2019-12-03 PROCEDURE — 97110 THERAPEUTIC EXERCISES: CPT | Performed by: PHYSICAL THERAPIST

## 2019-12-03 NOTE — PROGRESS NOTES
Daily Note     Today's date: 12/3/2019  Patient name: Mikaela Marques  : 1965  MRN: [id number]  Referring provider: Karen Pantoja MD  Dx:   Encounter Diagnosis     ICD-10-CM    1  Bilateral primary osteoarthritis of knee M17 0                   Subjective: The patient complains of 4/10 pain at her right posterior / lateral knee and lateral calf  The patient notes the pain increases with weight bearing  Objective: See treatment diary below      Assessment: The patient continues to ambulate with B/L axillary crutches due to pain but notes her function is much improved compared to 2 weeks ago  The patient may have a component of lumbar derangement affecting her right leg so she was given postural education  The patient continues to lack full right knee extension and demonstrates B/L quadriceps weakness R>L LE  The patient will benefit from continued PT to achieve her functional goals  Plan: Continue per plan of care  Focus on restoring full knee extension B/L knees       Re-Evaluation: 19  PRECAUTIONS: DM/ ADHESIVE ALLERGY  Knee Specialty Daily Treatment Diary     Manual  11/21/19 11/26/19 11/29/19 12/3/19    PROM flex/ext  * Gentle EXT 3x:15( R w/ distraction)  Flex 3x:15 B/L Gentle EXT 3x:15( R w/ distraction)  Flex 3x:15 B/L Gentle EXT/FLEX  3x:15 B/L    Hamstring stretch  *3x:30 B/L :30x3 B/L :30 x 3 B/L                    Patellar mobs  *10x:05 with self instruction reviewed     Knee stretch on edge of mat  *EXT only    x1 min each EXT only    x1 min each EXT only x1 min ea        Exercise Diary  11/21/19 11/26/19 11/29/19 12/3/19    Nu Step S=10 TOWEL ROLL @ L2 x5 mins L2 8 mins L2 x13 min L2 x10 mins    Biodex Bike                PRONE PRESS UP    x10    Heel slides flex/abd  *FLEX / QS combo x10 B/L  ABD: x10B/L FLEX / QS combo x10 B/L  ABD: x10B/L FLEX/QS  Combo x15 B/L  ABD x15 B/L    Bridges  *10x:02 :02x15 2x10    EXT BOARD        Clam shells  *x10 :05 :05x10 x15    QS roll @ knee R: x5 p! L: x10       SLR all planes        SAQ  *? x10ea PRONE QS x10    LAQ  * x10 x10    Hamstring stretch 3x:30 B/L  :30x3 np    Calf stretch 3x:30 B/L  Wedge :20x3 np    Standing hamstring curls  * x10ea ALTERNATE x10 B/L    TKE        Total gym squats (deep)        Tandem Stand  * :15x2 :15 x 1    Step up        [address] [facility]        sidestepping  *// bars // bars 10ftx2 reviewed    Heel-toe amb        Back ext   :05 x10 10x    Mirror Amb with SPC  *  *25 ft x 4    Up/Down Steps with SOS  * x3 reviewed        Modalities 11/21/19 11/26/19 11/29/19 12/3/19    CP B/L KNEE declined declined  declined                          The patient was given a new home exercise plan with written handout, pictures, and verbal instruction  The patient accepts and understands the new home activities

## 2019-12-05 ENCOUNTER — OFFICE VISIT (OUTPATIENT)
Dept: PHYSICAL THERAPY | Facility: CLINIC | Age: 54
End: 2019-12-05
Payer: COMMERCIAL

## 2019-12-05 DIAGNOSIS — M17.0 BILATERAL PRIMARY OSTEOARTHRITIS OF KNEE: Primary | ICD-10-CM

## 2019-12-05 PROCEDURE — 97110 THERAPEUTIC EXERCISES: CPT | Performed by: PHYSICAL THERAPIST

## 2019-12-05 PROCEDURE — 97535 SELF CARE MNGMENT TRAINING: CPT | Performed by: PHYSICAL THERAPIST

## 2019-12-05 PROCEDURE — 97140 MANUAL THERAPY 1/> REGIONS: CPT | Performed by: PHYSICAL THERAPIST

## 2019-12-05 NOTE — PROGRESS NOTES
Daily Note     Today's date: 2019  Patient name: Sharri East  : 1965  MRN: [id number]  Referring provider: Steven Valderrama MD  Dx:   Encounter Diagnosis     ICD-10-CM    1  Bilateral primary osteoarthritis of knee M17 0                   Subjective: The patient notes she is feeling a 2/10 pain at her right anterior lateral knee joint line  The patient is now walking with a SPC and feels she is making significant progress towards improving her knee extension ROM  Objective: See treatment diary below      Assessment: The patient is making steady improvements with therapy  She continues to have difficulty with weight bearing with R LE - the patient must alternate sides when performing ham curls and standing hip exercises  The patient will benefit from continued PT to achieve her functional goals  Plan: Continue per plan of care  Progress treatment as tolerated         Re-Evaluation: 19  PRECAUTIONS: DM/ ADHESIVE ALLERGY  Knee Specialty Daily Treatment Diary     Manual  11/21/19 11/26/19 11/29/19 12/3/19 12/5/19   PROM flex/ext  * Gentle EXT 3x:15( R w/ distraction)  Flex 3x:15 B/L Gentle EXT 3x:15( R w/ distraction)  Flex 3x:15 B/L Gentle EXT/FLEX  3x:15 B/L Gentle EXT/FLEX  3x:15 B/L   Hamstring stretch  *3x:30 B/L :30x3 B/L :30 x 3 B/L :30x3 B/L                   Patellar mobs  *10x:05 with self instruction reviewed  performed   Knee stretch on edge of mat  *EXT only    x1 min each EXT only    x1 min each EXT only x1 min ea EXT only   x1 min       Exercise Diary  11/21/19 11/26/19 11/29/19 12/3/19 12/5/19   Nu Step S=10 TOWEL ROLL @ Back L2 x5 mins L2 8 mins L2 x13 min L2 x10 mins L3 x10 mins   Biodex Bike                PRONE PRESS UP    x10 x10   Heel slides flex/abd  *FLEX / QS combo x10 B/L  ABD: x10B/L FLEX / QS combo x10 B/L  ABD: x10B/L FLEX/QS  Combo x15 B/L  ABD x15 B/L FLEX/QS  Combo x15 B/L  ABD x15 B/L   Bridges  *10x:02 :02x15 2x10 2x10   EXT BOARD        Clam shells *x10 :05 :05x10 x15 X15   QS roll @ knee R: x5 p! L: x10       SLR all planes        SAQ  *? x10ea PRONE QS x10 PRONE QS  x15   LAQ  * x10 x10 4# x8 L LE  0# x10   Hamstring stretch 3x:30 B/L  :30x3 np    Calf stretch 3x:30 B/L  Wedge :20x3 np    Standing hamstring curls  * x10ea ALTERNATE x10 B/L ALT x10 B/L   COMBO hip EXT and Abd     x8 B/L   p! With RLE WB   Total gym squats (deep)        Tandem Stand  * :15x2 :15 x 1    Step up        [address] [facility]        sidestepping  *// bars // bars 10ftx2 reviewed    Heel-toe amb        Back ext   :05 x10 10x    Mirror Amb with SPC  *  *25 ft x 4 np   Up/Down Steps with SOS  * x3 reviewed x3       Modalities 11/21/19 11/26/19 11/29/19 12/3/19 12/5/19   CP B/L KNEE declined declined  declined declined                         The patient was given a new home exercise plan with written handout, pictures, and verbal instruction  The patient accepts and understands the new home activities

## 2019-12-06 ENCOUNTER — OFFICE VISIT (OUTPATIENT)
Dept: PHYSICAL THERAPY | Facility: CLINIC | Age: 54
End: 2019-12-06
Payer: COMMERCIAL

## 2019-12-06 DIAGNOSIS — M17.0 BILATERAL PRIMARY OSTEOARTHRITIS OF KNEE: Primary | ICD-10-CM

## 2019-12-06 PROCEDURE — 97140 MANUAL THERAPY 1/> REGIONS: CPT | Performed by: PHYSICAL THERAPIST

## 2019-12-06 PROCEDURE — 97110 THERAPEUTIC EXERCISES: CPT | Performed by: PHYSICAL THERAPIST

## 2019-12-06 PROCEDURE — 97116 GAIT TRAINING THERAPY: CPT | Performed by: PHYSICAL THERAPIST

## 2019-12-06 NOTE — PROGRESS NOTES
Daily Note     Today's date: 2019  Patient name: Samuel Farris  : 1965  MRN: [id number]  Referring provider: Abdullahi Soto MD  Dx:   Encounter Diagnosis     ICD-10-CM    1  Bilateral primary osteoarthritis of knee M17 0                   Subjective: The patient notes using the towel roll with sitting and performing the standing back extensions after sitting for awhile have improved her ability to stand up and walk after sitting  Objective: See treatment diary below      Assessment: The patient tolerated all activities well today  The patient needed verbal and manual cues for proper posture and technique to perform the exercises properly  There were no complaints of increased pain or problems after the session today  The patient will benefit from continued skilled physical therapy to progress towards achieving patient centered goals  Plan: Continue per plan of care  Progress treatment as tolerated         Re-Evaluation: 19  PRECAUTIONS: DM/ ADHESIVE ALLERGY  Knee Specialty Daily Treatment Diary     Manual  12/6/19  11/29/19 12/3/19 12/5/19   PROM flex/ext Gentle EXT/FLEX  3x:15 B/L  Gentle EXT 3x:15( R w/ distraction)  Flex 3x:15 B/L Gentle EXT/FLEX  3x:15 B/L Gentle EXT/FLEX  3x:15 B/L   Hamstring stretch :30x3 B/L  :30x3 B/L :30 x 3 B/L :30x3 B/L           R fibular head A/P mob done       Patellar mobs   reviewed  performed   Knee stretch on edge of mat EXT only x 1min ea  EXT only    x1 min each EXT only x1 min ea EXT only   x1 min       Exercise Diary  12/6/19  11/29/19 12/3/19 12/5/19   Nu Step S=10 TOWEL ROLL @ Back L3 x11 mins  L2 x13 min L2 x10 mins L3 x10 mins   Biodex Bike                PRONE PRESS UP x10   x10 x10   Heel slides flex/abd Flex/QS  Combo x15 B/L  ABD x15 B/L  FLEX / QS combo x10 B/L  ABD: x10B/L FLEX/QS  Combo x15 B/L  ABD x15 B/L FLEX/QS  Combo x15 B/L  ABD x15 B/L   Bridges 2x10  :02x15 2x10 2x10   EXT BOARD        Clam shells x15 B/L  :05x10 x15 X15 QS roll @ knee        SLR all planes        SAQ PRONE QS x15  x10ea PRONE QS x10 PRONE QS  x15   LAQ   x10 x10 4# x8 L LE  0# x10   Hamstring stretch   :30x3 np    Calf stretch   Wedge :20x3 np    Standing hamstring curls reviewed  x10ea ALTERNATE x10 B/L ALT x10 B/L   COMBO hip EXT and Abd reviewed    x8 B/L   p!  With RLE WB   Total gym squats (deep)        Tandem Stand   :15x2 :15 x 1    Step up        [address] [facility]        sidestepping   // bars 10ftx2 reviewed    Heel-toe amb        Back ext 2x10  :05 x10 10x    Mirror Amb with SPC Mirror without SPC x10 mins   *25 ft x 4 np   Up/Down Steps with SOS   x3 reviewed x3       Modalities 12/6/19  11/29/19 12/3/19 12/5/19   CP B/L KNEE declined   declined declined

## 2019-12-09 ENCOUNTER — OFFICE VISIT (OUTPATIENT)
Dept: PHYSICAL THERAPY | Facility: CLINIC | Age: 54
End: 2019-12-09
Payer: COMMERCIAL

## 2019-12-09 DIAGNOSIS — M17.0 BILATERAL PRIMARY OSTEOARTHRITIS OF KNEE: Primary | ICD-10-CM

## 2019-12-09 PROCEDURE — 97110 THERAPEUTIC EXERCISES: CPT | Performed by: PHYSICAL THERAPIST

## 2019-12-09 PROCEDURE — 97140 MANUAL THERAPY 1/> REGIONS: CPT | Performed by: PHYSICAL THERAPIST

## 2019-12-09 NOTE — PROGRESS NOTES
Daily Note     Today's date: 2019  Patient name: Priya Baron  : 1965  MRN: [id number]  Referring provider: Rabia White MD  Dx:   Encounter Diagnosis     ICD-10-CM    1  Bilateral primary osteoarthritis of knee M17 0                   Subjective: The patient reports feeling a 4/10 pain at her right knee this morning  The patient is in a good mood this morning because she was able to tolerate walking a lot over the weekend  Objective: See treatment diary below      Assessment: The patient tolerated all activities fair today  The patient needed verbal and manual cues for proper posture and technique to perform the exercises properly  There were mild complaints of increased pain at her right lateral knee joint at the end of the session - the patient declined a cold pack  The patient will benefit from continued skilled physical therapy to progress towards achieving patient centered goals  Plan: Continue per plan of care  Progress treatment as tolerated         Re-Evaluation: 19  PRECAUTIONS: DM/ ADHESIVE ALLERGY  Knee Specialty Daily Treatment Diary     Manual  12/6/19 12/9/19  12/3/19 12/5/19   PROM flex/ext Gentle EXT/FLEX  3x:15 B/L Gentle 3x:15 B/L flex/ext  Gentle EXT/FLEX  3x:15 B/L Gentle EXT/FLEX  3x:15 B/L   Hamstring stretch :30x3 B/L :30x3 B/L  :30 x 3 B/L :30x3 B/L           R fibular head A/P mob done done      Patellar mobs     performed   Knee stretch on edge of mat EXT only x 1min ea EXT x 1min ea  EXT only x1 min ea EXT only   x1 min       Exercise Diary  12/6/19 12/9/19  12/3/19 12/5/19   Nu Step S=10 TOWEL ROLL @ Back L3 x11 mins L3 x10 mins  L2 x10 mins L3 x10 mins   Biodex Bike                PRONE PRESS UP x10 x10  x10 x10   Heel slides flex/abd Flex/QS  Combo x15 B/L  ABD x15 B/L COMBO heelslide/QS x10 B/L  ABDx10 B/L  FLEX/QS  Combo x15 B/L  ABD x15 B/L FLEX/QS  Combo x15 B/L  ABD x15 B/L   Bridges 2x10 2x10  2x10 2x10   EXT BOARD        Clam shells x15 B/L HEP  x15 X15   QS roll @ knee        SLR all planes        SAQ PRONE QS x15 PRONE QS  x15  PRONE QS x10 PRONE QS  x15   LAQ    x10 4# x8 L LE  0# x10   Hamstring stretch    np    Calf stretch    np    Standing hamstring curls reviewed   ALTERNATE x10 B/L ALT x10 B/L   COMBO hip EXT and Abd reviewed    x8 B/L   p!  With RLE WB   Total gym squats (deep)        Tandem Stand    :15 x 1    Step up  5x ea fwd B/L 4"      Fitter board        Mini Squat        [place]        sidestepping    reviewed    Heel-toe amb        Back ext 2x10 10x  10x    Mirror Amb with SPC Mirror without SPC x10 mins Mirror without any device 25 ft x6  *25 ft x 4 np   Up/Down Steps with SOS    reviewed x3       Modalities 12/6/19 12/9/19  12/3/19 12/5/19   CP B/L KNEE declined declined  declined declined

## 2019-12-10 ENCOUNTER — OFFICE VISIT (OUTPATIENT)
Dept: PHYSICAL THERAPY | Facility: CLINIC | Age: 54
End: 2019-12-10
Payer: COMMERCIAL

## 2019-12-10 DIAGNOSIS — M17.0 BILATERAL PRIMARY OSTEOARTHRITIS OF KNEE: Primary | ICD-10-CM

## 2019-12-10 PROCEDURE — 97110 THERAPEUTIC EXERCISES: CPT | Performed by: PHYSICAL THERAPIST

## 2019-12-10 PROCEDURE — 97140 MANUAL THERAPY 1/> REGIONS: CPT | Performed by: PHYSICAL THERAPIST

## 2019-12-10 NOTE — PROGRESS NOTES
Daily Note     Today's date: 12/10/2019  Patient name: Ruddy Corral  : 1965  MRN: [id number]  Referring provider: Jeri Barboza MD  Dx:   Encounter Diagnosis     ICD-10-CM    1  Bilateral primary osteoarthritis of knee M17 0                   Subjective: The patient started the session crying due to having a rough morning moving her knees  The patient rates her pain 4/10 at the right lateral knee and 2/10 pain at her left anterior knee  Objective: See treatment diary below      Assessment: The patient tolerated all activities well today  The patient needed verbal cues for proper posture and technique to perform the exercises properly  There were mild complaints of increased pain at her right lateral knee but otherwise no problems reported after the session today  The patient needed encouragement and reminders that pain exacerbations are a part of the normal recovery process  The patient will benefit from continued skilled physical therapy to progress towards achieving patient centered goals  Plan: Continue per plan of care  Progress treatment as tolerated  Re-Evaluation: 19  PRECAUTIONS: DM/ ADHESIVE ALLERGY  Knee Specialty Daily Treatment Diary     Manual  12/6/19 12/9/19 12/10/19  12/5/19   PROM flex/ext Gentle EXT/FLEX  3x:15 B/L Gentle 3x:15 B/L flex/ext R knee - inv foot w/ flex  B/L flex/ext 3x:15 ea  Gentle EXT/FLEX  3x:15 B/L   Hamstring stretch :30x3 B/L :30x3 B/L P! p! hold  :30x3 B/L           R fibular head A/P mob done done hold     Patellar mobs     performed   Knee stretch on edge of mat EXT only x 1min ea EXT x 1min ea EXT x 1 min B/L  EXT only   x1 min       Exercise Diary  12/6/19 12/9/19 12/10/19  12/5/19   Nu Step S=10 TOWEL ROLL @ Back L3 x11 mins L3 x10 mins L3 x34fpkg  L3 x10 mins   Biodex Bike                PRONE PRESS UP x10 x10 x10  x10   Heel slides flex/abd Flex/QS  Combo x15 B/L  ABD x15 B/L COMBO heelslide/QS x10 B/L  ABDx10 B/L COMBO heelslide  x15 B/L  ABD x15 B/L  FLEX/QS  Combo x15 B/L  ABD x15 B/L   Bridges 2x10 2x10 2x10  2x10   EXT BOARD        Clam shells x15 B/L HEP x15 B/L  X15   QS roll @ knee        SLR all planes   Flex x10     SAQ PRONE QS x15 PRONE QS  x15 Prone QS 2x10  PRONE QS  x15   LAQ     4# x8 L LE  0# x10   Hamstring stretch        Calf stretch        Standing hamstring curls reviewed  reviewed  ALT x10 B/L   COMBO hip EXT and Abd reviewed  reviewed  x8 B/L   p!  With RLE WB   Total gym squats (deep)        Tandem Stand        Step up  5x ea fwd B/L 4"      Fitter board        Mini Squat        [place]        sidestepping        Heel-toe amb        Back ext 2x10 10x      Mirror Amb with SPC Mirror without SPC x10 mins Mirror without any device 25 ft x6 mirror with SPC  25 ft x 8  np   Up/Down Steps with SOS     x3       Modalities 12/6/19 12/9/19 12/10/19 12/3/19 12/5/19   CP B/L KNEE declined declined declined declined declined

## 2019-12-11 ENCOUNTER — CLINICAL SUPPORT (OUTPATIENT)
Dept: BARIATRICS | Facility: CLINIC | Age: 54
End: 2019-12-11

## 2019-12-11 VITALS — BODY MASS INDEX: 38.24 KG/M2 | WEIGHT: 224 LBS | HEIGHT: 64 IN

## 2019-12-11 DIAGNOSIS — Z98.84 BARIATRIC SURGERY STATUS: Primary | ICD-10-CM

## 2019-12-11 PROCEDURE — RECHECK: Performed by: DIETITIAN, REGISTERED

## 2019-12-11 NOTE — PROGRESS NOTES
Weight Management Nutrition Class     Diagnosis: Morbid Obesity    Bariatric Surgeon: Dr Mandi Sales    Surgery: Vertical Sleeve Gastrectomy    Class: 5 week post op     Topics discussed today include:     fluid goals post op, protein goals post op, constipation, chew food well, exercise, avoidance of alcohol, PPI use, diet progression, hypoglycemia, dumping syndrome, protein supplems, vitamin/mineral supplements, calcium supplements, additional vitamin B12, iron supplements and fat soluble vitamins     Patient was able to verbalize basic diet (protein, fluid, vitamin and mineral) recommendations and possible nutrition-related complications   Yes

## 2019-12-13 ENCOUNTER — OFFICE VISIT (OUTPATIENT)
Dept: PHYSICAL THERAPY | Facility: CLINIC | Age: 54
End: 2019-12-13
Payer: COMMERCIAL

## 2019-12-13 DIAGNOSIS — M17.0 BILATERAL PRIMARY OSTEOARTHRITIS OF KNEE: Primary | ICD-10-CM

## 2019-12-13 PROCEDURE — 97110 THERAPEUTIC EXERCISES: CPT

## 2019-12-13 NOTE — PROGRESS NOTES
Daily Note     Today's date: 2019  Patient name: Ronny Orosco  : 1965  MRN: [id number]  Referring provider: Richard Hayden MD  Dx:   Encounter Diagnosis     ICD-10-CM    1  Bilateral primary osteoarthritis of knee M17 0                   Subjective: Patient states her pain is 1/10  Objective: See treatment diary below      Assessment: Tolerated treatment well  Patient would benefit from continued PT for stretching and strengthening  Patient was able to increase exercises without difficulty  Little discomfort at end range stretch for right knee extension  She was very talkative during entire session  Patient had no pain by the end of today's session  Plan: Continue per plan of care  Progress treatment as tolerated  Re-Evaluation: 19  PRECAUTIONS: DM/ ADHESIVE ALLERGY  Knee Specialty Daily Treatment Diary     Manual  12/6/19 12/9/19 12/10/19 12/13/19    PROM flex/ext Gentle EXT/FLEX  3x:15 B/L Gentle 3x:15 B/L flex/ext R knee - inv foot w/ flex  B/L flex/ext 3x:15 ea R knee - inv foot w/ flex  B/L flex/ext 3x:15 ea    Hamstring stretch :30x3 B/L :30x3 B/L P! p! hold :30x3 B/L            R fibular head A/P mob done done hold     Patellar mobs        Knee stretch on edge of mat EXT only x 1min ea EXT x 1min ea EXT x 1 min B/L EXT x 1 min B/L        Exercise Diary  12/6/19 12/9/19 12/10/19 12/13/19    Nu Step S=10 TOWEL ROLL @ Back L3 x11 mins L3 x10 mins L3 [id number] L3 x10 min    Biodex Bike                PRONE PRESS UP x10 x10 x10 x10    Heel slides flex/abd Flex/QS  Combo x15 B/L  ABD x15 B/L COMBO heelslide/QS x10 B/L  ABDx10 B/L COMBO heelslide  x15 B/L  ABD x15 B/L COMBO heelslide  x20 B/L  ABD x20 B/L    Bridges 2x10 2x10 2x10 2x10    EXT BOARD        Clam shells x15 B/L HEP x15 B/L x20    QS roll @ knee        SLR all planes   Flex x10 Flex x15    SAQ PRONE QS x15 PRONE QS  x15 Prone QS 2x10 Prone QS 2x10 roll at ankle    LAQ        Hamstring stretch        Calf stretch Standing hamstring curls reviewed  reviewed     COMBO hip EXT and Abd reviewed  reviewed     Total gym squats (deep)        Tandem Stand        Step up  5x ea fwd B/L 4"  10x ea fwd B/L 4"    Fitter board        Mini Squat        [place]        sidestepping        Heel-toe amb        Back ext 2x10 10x  x10    Mirror Amb with SPC Mirror without SPC x10 mins Mirror without any device 25 ft x6 mirror with SPC  25 ft x 8 mirror with SPC  25 ft x 8    Up/Down Steps with SOS            Modalities 12/6/19 12/9/19 12/10/19 12/13/19    CP B/L KNEE declined declined declined

## 2019-12-16 ENCOUNTER — OFFICE VISIT (OUTPATIENT)
Dept: PHYSICAL THERAPY | Facility: CLINIC | Age: 54
End: 2019-12-16
Payer: COMMERCIAL

## 2019-12-16 DIAGNOSIS — M17.0 BILATERAL PRIMARY OSTEOARTHRITIS OF KNEE: Primary | ICD-10-CM

## 2019-12-16 PROCEDURE — 97140 MANUAL THERAPY 1/> REGIONS: CPT | Performed by: PHYSICAL THERAPIST

## 2019-12-16 PROCEDURE — 97110 THERAPEUTIC EXERCISES: CPT | Performed by: PHYSICAL THERAPIST

## 2019-12-16 NOTE — PROGRESS NOTES
Daily Note     Today's date: 2019  Patient name: Gary Reeder  : 1965  MRN: [id number]  Referring provider: Shahnaz Veliz MD  Dx:   Encounter Diagnosis     ICD-10-CM    1  Bilateral primary osteoarthritis of knee M17 0                   Subjective: The patient reports feeling good this morning with minimal complaints of B/L knee pain  The patient notes 1/10 pain at her right lateral knee  The patient is able to straighten her knees out in the morning with less difficulty  Objective: See treatment diary below      Assessment: The patient tolerated all activities well today  The patient is able to tolerate more weight-bearing on her right lower extremity during gait/ WB exercises  There were no complaints of increased pain or problems after the session today  The patient will benefit from continued skilled physical therapy to progress towards achieving patient centered goals  Plan: Continue per plan of care  Progress note during next visit  Re-Evaluation: 19  PRECAUTIONS: DM/ ADHESIVE ALLERGY  Knee Specialty Daily Treatment Diary     Manual  12/6/19 12/9/19 12/10/19 12/13/19 12/16/19   PROM flex/ext Gentle EXT/FLEX  3x:15 B/L Gentle 3x:15 B/L flex/ext R knee - inv foot w/ flex  B/L flex/ext 3x:15 ea R knee - inv foot w/ flex  B/L flex/ext 3x:15 ea R knee inv foot w/ flex B/L flex/ext  3x:15 ea   Hamstring stretch :30x3 B/L :30x3 B/L P! p! hold :30x3 B/L :30x3 B/L           R fibular head A/P mob done done hold     Patellar mobs        Knee stretch on edge of mat EXT only x 1min ea EXT x 1min ea EXT x 1 min B/L EXT x 1 min B/L EXT x1 min ea       Exercise Diary  12/6/19 12/9/19 12/10/19 12/13/19 12/16/19   Nu Step S=10 TOWEL ROLL @ Back L3 x11 mins L3 x10 mins L3 [id number] L3 x10 min L3 x10 mins   Biodex Bike                PRONE PRESS UP x10 x10 x10 x10 x10   Heel slides flex/abd Flex/QS  Combo x15 B/L  ABD x15 B/L COMBO heelslide/QS x10 B/L  ABDx10 B/L COMBO heelslide  x15 B/L  ABD x15 B/L COMBO heelslide  x20 B/L  ABD x20 B/L COMBO x15 ea   Bridges 2x10 2x10 2x10 2x10 2x10   EXT BOARD        Clam shells x15 B/L HEP x15 B/L x20 x20   QS roll @ knee        SLR all planes   Flex x10 Flex x15 Flex x20   SAQ PRONE QS x15 PRONE QS  x15 Prone QS 2x10 Prone QS 2x10 roll at ankle Prone QS 2x10   LAQ        Hamstring stretch        Calf stretch        Standing hamstring curls reviewed  reviewed     COMBO hip EXT and Abd reviewed  reviewed     Total gym squats (deep)        Tandem Stand        Step up  5x ea fwd B/L 4"  10x ea fwd B/L 4" 10x 4" B/L fwd/side   Fitter board        Mini Squat        [place]     2H R x10  0H L x10   sidestepping        Heel-toe amb        Back ext 2x10 10x  x10 x10   Mirror Amb with SPC Mirror without SPC x10 mins Mirror without any device 25 ft x6 mirror with SPC  25 ft x 8 mirror with SPC  25 ft x 8 Mirror without SPC 25 ft x 2   Up/Down Steps with SOS            Modalities 12/6/19 12/9/19 12/10/19 12/13/19 12/16/19   CP B/L KNEE declined declined declined  declined

## 2019-12-17 ENCOUNTER — APPOINTMENT (OUTPATIENT)
Dept: PHYSICAL THERAPY | Facility: CLINIC | Age: 54
End: 2019-12-17
Payer: COMMERCIAL

## 2019-12-20 ENCOUNTER — EVALUATION (OUTPATIENT)
Dept: PHYSICAL THERAPY | Facility: CLINIC | Age: 54
End: 2019-12-20
Payer: COMMERCIAL

## 2019-12-20 DIAGNOSIS — M17.0 BILATERAL PRIMARY OSTEOARTHRITIS OF KNEE: Primary | ICD-10-CM

## 2019-12-20 PROCEDURE — 97140 MANUAL THERAPY 1/> REGIONS: CPT | Performed by: PHYSICAL THERAPIST

## 2019-12-20 PROCEDURE — 97110 THERAPEUTIC EXERCISES: CPT | Performed by: PHYSICAL THERAPIST

## 2019-12-20 PROCEDURE — 97535 SELF CARE MNGMENT TRAINING: CPT | Performed by: PHYSICAL THERAPIST

## 2019-12-20 NOTE — PROGRESS NOTES
PT Re-Evaluation     Today's date: 2019  Patient name: Gwendolyn Peterson  : 1965  MRN: [id number]  Referring provider: Monse Ospina MD  Dx:   Encounter Diagnosis     ICD-10-CM    1  Bilateral primary osteoarthritis of knee M17 0                   Assessment  Assessment details: Gwendolyn Peterson is a 48 y o  female that has attended 11 sessions of physical therapy presents for a re-evaluation today  During the examination the patient demonstrates: improved B/L knee ROM, improved B/L LE strength, improved gait mechanics, decreased B/L knee pain, and improved activity tolerance  The patient has made functional gains since starting therapy  The patient is now able to walk community distances with a SPC versus B/L axillary crutches  The patient is able to stand/walk longer distances, transfer sit to stand, and perform all ADL's with much less difficulty since receiving physical therapy  The patient continues to have difficulty with walking without an assistive device, ambulating up/down the steps normally, squatting, kneeling, and lifting/carrying items heavier than 15 pounds  The patient will benefit from continued skilled PT to address impairments, work towards goals, and restore patient PLOF  Impairments: abnormal gait, abnormal or restricted ROM, activity intolerance, impaired balance, impaired physical strength, lacks appropriate home exercise program and pain with function  Functional limitations: difficulty with prolonged standing, prolonged walking, unable to go shopping, difficulty squatting/kneeling, difficulty stair-climbing, difficulty with ADL's, and difficulty transferring from low surfaces  Symptom irritability: moderateBarriers to therapy: significant medical history  Understanding of Dx/Px/POC: good   Prognosis: good  Prognosis details: Decreased due to significant medical history    Goals  STG: Achieve in 4 weeks  1    Patient's B/L knee pain at worst less than 4/10 to allow for proper gait without crutches  PROGRESSING 12/20/19  2  Patient's B/L knee ROM improve by 10-15 degrees to improve ambulating up/down the steps  MET12/120/19  3  LE MMT improve to > 4-/5 all motions tested to improve ADL/recreational activities  PROGRESSING 12/20/19  4  Timed up and go score improve to less than 14 seconds  LTG:  Achieve in 4-8 weeks  1  Patient's knee FOTO score improve by 25% from last assessment  PROGRESSING 12/20/19  2  Patient achieve personal goal of walking without B/L crutches and walk up/down the steps more normally without knee pain > 4/10  PROGRESSING 12/20/19  3  Patient to achieve independence with home exercise plan  PROGRESSING 12/20/19  4  30 second sit to stand test score improve by 5 stands to a total of 10 stands to indicate improved transfers  MET 12/20/19      Plan  Patient would benefit from: skilled physical therapy  Planned modality interventions: thermotherapy: hydrocollator packs, cryotherapy, unattended electrical stimulation and electrical stimulation/Russian stimulation  Other planned modality interventions: [facility]  Planned therapy interventions: joint mobilization, manual therapy, massage, neuromuscular re-education, patient education, postural training, self care, strengthening, stretching, therapeutic activities, therapeutic exercise, therapeutic training, transfer training, home exercise program, gait training, balance, balance/weight bearing training and abdominal trunk stabilization  Frequency: 2-3x/wk  Duration in weeks: 8  Plan of Care beginning date: 11/21/2019  Plan of Care expiration date: 1/16/2020  Treatment plan discussed with: PTA and patient        Subjective Evaluation    History of Present Illness  Date of onset: 9/7/2016  Mechanism of injury: SUBJECTIVE: 12/19/19:  The patient reports she is now able to walk community distances with a SPC versus the axillary crutches    The patient is able to tolerate standing, transfers, prolonged walking, and steps with less pain/difficulty  The patient is still having walking difficulty walking without any assistive devices, difficulty with ambulating up/down the steps normally, and she is unable to perform any squatting/kneeling activities  The patient feels she will benefit from continuing skilled outpatient physical therapy  INJURY HISTORY:  Josefa Ledezma is a 48 y o  female that presents to outpatient physical therapy with complaints of pain and lacking ROM at her B/L knees R>L  The patient notes difficulty bearing weight with her R LE  - thus requiring axillary crutches for all ambulation  The patient reports in 2016 she was diagnosed with right knee mensicus tears which she received a knee scope and a lateral release  After that surgery the patient had difficulty ambulating and feels she never fully recovered afterwards  The patient notes her B/L knees have gotten worse over the past 3 years most notably the past 2 months  The patient has been receiving multiple cortisone shots over this span of time  The patient consulted with Dr Humera Holt who ordered outpatient PT to decrease pain and improve the patient's function  The patient recently underwent weight loss surgery - the patient had to stop taking Voltaren gel and was immobilized which did exacerbate the patient's knee pain  The patient is now allowed to resume the Voltaren gel with PPI med  The patient's main goal for physical therapy is to walk without crutches and walk up and down the steps more normally ( difficulty with L knee)       Past surgical History: 2016 hx of R knee scope with lateral release          Recurrent probem    Pain  Current pain ratin  At best pain ratin  At worst pain ratin  Location: R anterio knee joint line, R lateral knee joint line; L anterior knee  Quality: sharp and dull ache  Relieving factors: rest, medications and ice  Aggravating factors: standing, walking, stair climbing and running  Progression: improved    Social Support  Steps to enter house: yes  5  Stairs in house: yes   13  Lives in: multiple-level home  Lives with: spouse and young children    Employment status: working ( - works from home)  Hand dominance: left    Treatments  Previous treatment: medication  Current treatment: physical therapy  Patient Goals  Patient goals for therapy: decreased pain, improved balance, increased motion, increased strength, independence with ADLs/IADLs, return to sport/leisure activities and return to work  Patient goal: walk up down the steps more normally and walk without crutches        Objective     Palpation     Additional Palpation Details  No muscle tenderness    Tenderness     Additional Tenderness Details  No tenderness    Neurological Testing     Sensation     Knee   Left Knee   Intact: light touch    Right Knee   Intact: light touch     Active Range of Motion   Left Knee   Flexion: 117 degrees   Extension: -3 degrees     Right Knee   Flexion: 121 degrees   Extension: -11 degrees     Additional Active Range of Motion Details  L: 15 degree improvement  R: 14 degree improvement    Passive Range of Motion   Left Knee   Flexion: 118 degrees   Extension: -2 degrees with pain    Right Knee   Flexion: 121 degrees   Extension: -9 degrees with pain    Mobility   Patellar Mobility:   Left Knee   Hypomobile: left medial, left lateral, left superior and left inferior    Right Knee   Hypomobile: medial, lateral, superior and inferior     Additional Mobility Details  Improved mobility B/L patella    Strength/Myotome Testing     Left Hip   Planes of Motion   Flexion: 4  Extension: 4  Abduction: 4+  Adduction: 4+    Right Hip   Planes of Motion   Flexion: 4-  Extension: 4-  Abduction: 4  Adduction: 4+    Left Knee   Flexion: 4  Extension: 4  Quadriceps contraction: fair    Right Knee   Flexion: 3+  Extension: 3+  Quadriceps contraction: fair    Tests     Additional Tests Details  FOTO: 44% function ( improved 18% since last assessment)    Gait Impairments: The patient is ambulating with a SPC with improved gait speed, forward trunk flexion, improved but still decreased WB with R LE, improved R LE heel strike, Unable to fully extend R LE  The patient demonstrates improved heel-toe rollover  Timed Up and Go: 14 seconds with SPC ( IMPROVED 13 SECONDS)    30 second sit to stand test: 10 stands NO HANDS (improved 5 stands)    SLB: Unable to assume SLB safely    Swelling     Left Knee Girth Measurement (cm)   Joint line: 41 cm    Right Knee Girth Measurement (cm)   Joint line: 42 5 cm               Re-Evaluation: 1/16/19  PRECAUTIONS: DM/ ADHESIVE ALLERGY  Knee Specialty Daily Treatment Diary     Manual  12/20/19 12/9/19 12/10/19 12/13/19 12/16/19   PROM flex/ext Gentle 3x:15 flex/ext Gentle 3x:15 B/L flex/ext R knee - inv foot w/ flex  B/L flex/ext 3x:15 ea R knee - inv foot w/ flex  B/L flex/ext 3x:15 ea R knee inv foot w/ flex B/L flex/ext  3x:15 ea   Hamstring stretch :30x3 B/L :30x3 B/L P! p! hold :30x3 B/L :30x3 B/L           R fibular head A/P mob  done hold     Patellar mobs reviewed       Knee stretch on edge of mat EXT x1 mins EXT x 1min ea EXT x 1 min B/L EXT x 1 min B/L EXT x1 min ea       Exercise Diary  12/20/19  12/10/19 12/13/19 12/16/19   Nu Step S=10 TOWEL ROLL @ Back L3 x10 mins roll  L3 [id number] L3 x10 min L3 x10 mins   Biodex Bike        EXT board  *      PRONE PRESS UP   x10 x10 x10   Heel slides flex/abd reviewed  COMBO heelslide  x15 B/L  ABD x15 B/L COMBO heelslide  x20 B/L  ABD x20 B/L COMBO x15 ea   Bridges reviewed  2x10 2x10 2x10   TKE x5 with ball p!        Clam shells   x15 B/L x20 x20   QS roll @ knee        SLR all planes   Flex x10 Flex x15 Flex x20   SAQ   Prone QS 2x10 Prone QS 2x10 roll at ankle Prone QS 2x10   LAQ        Total Gym Squats  *      Calf stretch        Standing hamstring curls   reviewed     COMBO hip EXT and Abd   reviewed     Total gym squats (deep)        Hurdles walk 25ft x4        Step up 10x ea fwd/side   10x ea fwd B/L 4" 10x 4" B/L fwd/side   SLB 1x:10 ea       Mini Squat        [place]     2H R x10  0H L x10   sidestepping 25ft x4 no cane       Heel-toe amb        Back ext 10x   x10 x10   Mirror Amb with SPC No SPC 25ft x8  mirror with SPC  25 ft x 8 mirror with SPC  25 ft x 8 Mirror without SPC 25 ft x 2   Up/Down Steps with SOS            Modalities 12/20/19 12/9/19 12/10/19 12/13/19 12/16/19   CP B/L KNEE x7 mins declined declined  declined

## 2019-12-23 ENCOUNTER — OFFICE VISIT (OUTPATIENT)
Dept: PHYSICAL THERAPY | Facility: CLINIC | Age: 54
End: 2019-12-23
Payer: COMMERCIAL

## 2019-12-23 DIAGNOSIS — M17.0 BILATERAL PRIMARY OSTEOARTHRITIS OF KNEE: Primary | ICD-10-CM

## 2019-12-23 PROCEDURE — 97110 THERAPEUTIC EXERCISES: CPT | Performed by: PHYSICAL THERAPIST

## 2019-12-23 PROCEDURE — 97140 MANUAL THERAPY 1/> REGIONS: CPT | Performed by: PHYSICAL THERAPIST

## 2019-12-23 NOTE — PROGRESS NOTES
Daily Note     Today's date: 2019  Patient name: Eliseo Bella  : 1965  MRN: [id number]  Referring provider: Blanca Monique MD  Dx:   Encounter Diagnosis     ICD-10-CM    1  Bilateral primary osteoarthritis of knee M17 0                   Subjective: The patient notes she is doing well - she is walking more without her cane  The patient complains left knee pain rated 2/10 and right posterior lateral leg/thigh pain rated a 3/10 in intensity  The patient was able to go on the elliptical for 5 mins over the weekend      Objective: See treatment diary below      Assessment: The patient tolerated the addition of a knee extension stretch without complaints of increased pain  The patient performed total gym squats which did increase her knee pain - the patient has a high sensitivity to new activities  The patient will benefit from continued PT to achieve her functional goals of therapy  Plan: Continue per plan of care  Progress treatment as tolerated  Re-Evaluation: 19  PRECAUTIONS: DM/ ADHESIVE ALLERGY  Knee Specialty Daily Treatment Diary     Manual  19   PROM flex/ext Gentle 3x:15 flex/ext R knee inv foot w/ flex 3x:15  B/L flex/ext 3x:15  R knee - inv foot w/ flex  B/L flex/ext 3x:15 ea R knee inv foot w/ flex B/L flex/ext  3x:15 ea   Hamstring stretch :30x3 B/L :30 x 3 B/L  :30x3 B/L :30x3 B/L           R fibular head A/P mob        Patellar mobs reviewed reviewed      Knee stretch on edge of mat EXT x1 mins EXT x 1min ea  EXT x 1 min B/L EXT x1 min ea       Exercise Diary  19   Nu Step S=10 TOWEL ROLL @ Back L3 x10 mins roll L3  5 mins x 2  Roll @ L/B  L3 x10 min L3 x10 mins   Biodex Bike        EXT board  *2 mins R LE      PRONE PRESS UP  x10  x10 x10   Heel slides flex/abd reviewed   COMBO heelslide  x20 B/L  ABD x20 B/L COMBO x15 ea   Bridges reviewed 2x10  2x10 2x10   TKE x5 with ball p!        Clam shells    x20 x20   QS roll @ knee        SLR all planes    Flex x15 Flex x20   SAQ  PRONE QS 2x10  Prone QS 2x10 roll at ankle Prone QS 2x10   LAQ        Total Gym Squats  *x10      Calf stretch        Standing hamstring curls        COMBO hip EXT and Abd        Total gym squats (deep)        Hurdles walk 25ft x4  25ft x 2      Step up 10x ea fwd/side 10xea fwd/side B/L  10x ea fwd B/L 4" 10x 4" B/L fwd/side   SLB 1x:10 ea       Mini Squat        [place]     2H R x10  0H L x10   sidestepping 25ft x4 no cane 25 ft x 2      Heel-toe amb        Back ext 10x   x10 x10   Mirror Amb with SPC No SPC 25ft x8   mirror with SPC  25 ft x 8 Mirror without SPC 25 ft x 2   Up/Down Steps with SOS            Modalities 12/20/19 12/23/19 12/13/19 12/16/19   CP B/L KNEE x7 mins x7 mins   declined

## 2019-12-24 ENCOUNTER — OFFICE VISIT (OUTPATIENT)
Dept: PHYSICAL THERAPY | Facility: CLINIC | Age: 54
End: 2019-12-24
Payer: COMMERCIAL

## 2019-12-24 DIAGNOSIS — M17.0 BILATERAL PRIMARY OSTEOARTHRITIS OF KNEE: Primary | ICD-10-CM

## 2019-12-24 PROCEDURE — 97140 MANUAL THERAPY 1/> REGIONS: CPT | Performed by: PHYSICAL THERAPIST

## 2019-12-24 PROCEDURE — 97110 THERAPEUTIC EXERCISES: CPT | Performed by: PHYSICAL THERAPIST

## 2019-12-24 NOTE — PROGRESS NOTES
Daily Note     Today's date: 2019  Patient name: Jenna June  : 1965  MRN: [id number]  Referring provider: Luz Maria Villanueva MD  Dx:   Encounter Diagnosis     ICD-10-CM    1  Bilateral primary osteoarthritis of knee M17 0                   Subjective: The patient notes 3/10 pain at her left knee cap and 4/10 right posterior/lateral knee joint line pain  The patient is worried the total gym squats irritated her left knee  Objective: See treatment diary below      Assessment: The patient's left patellofemoral joint was tight today so the patella mobs were re-instructed to the patient- she accepts / understands  The patient tolerated all activities well with minimal complaints of pain increase at her B/L knees after the session  The patient will benefit from continued PT to achieve all goals  Plan: Continue per plan of care  Progress treatment as tolerated  Re-Evaluation: 19  PRECAUTIONS: DM/ ADHESIVE ALLERGY  Knee Specialty Daily Treatment Diary     Manual  19   PROM flex/ext Gentle 3x:15 flex/ext R knee inv foot w/ flex 3x:15  B/L flex/ext 3x:15 Right knee inv foot w/ flexion  B/L flex/ext  3x:15 ea  R knee inv foot w/ flex B/L flex/ext  3x:15 ea   Hamstring stretch :30x3 B/L :30 x 3 B/L :30x3 B/L  :30x3 B/L           R fibular head A/P mob        Patellar mobs reviewed reviewed Performed L LE and instructed again     Knee stretch on edge of mat EXT x1 mins EXT x 1min ea EXT/FLEX 1 min ea B/L ( total 4 mins)  EXT x1 min ea       Exercise Diary  19   Nu Step S=10 TOWEL ROLL @ Back L3 x10 mins roll L3  5 mins x 2  Roll @ L/B L3 5 mins x 2  L3 x10 mins   Biodex Bike        EXT board  *2 mins R LE 2 mins     PRONE PRESS UP  x10 x10  x10   Heel slides flex/abd reviewed  X15 combo heelslides  Flex/qs/abd  COMBO x15 ea   Bridges reviewed 2x10 x25  2x10   TKE x5 with ball p!        Clam shells     x20   QS roll @ knee SLR all planes     Flex x20   SAQ  PRONE QS 2x10 Prone QS  2x10  Prone QS 2x10   LAQ        Total Gym Squats  *x10      Calf stretch        Standing hamstring curls        COMBO hip EXT and Abd        Total gym squats (deep)        Hurdles walk 25ft x4  25ft x 2      Step up 10x ea fwd/side 10xea fwd/side B/L   10x 4" B/L fwd/side   SLB 1x:10 ea       Mini Squat        [place]     2H R x10  0H L x10   sidestepping 25ft x4 no cane 25 ft x 2      Heel-toe amb        Back ext 10x    x10   Mirror Amb with SPC No SPC 25ft x8  Walking without AD in gym 100ft x 2  Mirror without SPC 25 ft x 2   Up/Down Steps with SOS            Modalities 12/20/19 12/23/19 12/24/19 12/16/19   CP B/L KNEE x7 mins x7 mins declined  declined

## 2019-12-27 ENCOUNTER — OFFICE VISIT (OUTPATIENT)
Dept: PHYSICAL THERAPY | Facility: CLINIC | Age: 54
End: 2019-12-27
Payer: COMMERCIAL

## 2019-12-27 DIAGNOSIS — M17.0 BILATERAL PRIMARY OSTEOARTHRITIS OF KNEE: Primary | ICD-10-CM

## 2019-12-27 PROCEDURE — 97140 MANUAL THERAPY 1/> REGIONS: CPT | Performed by: PHYSICAL THERAPIST

## 2019-12-27 PROCEDURE — 97110 THERAPEUTIC EXERCISES: CPT | Performed by: PHYSICAL THERAPIST

## 2019-12-27 NOTE — PROGRESS NOTES
Daily Note     Today's date: 2019  Patient name: Nicol Isaac  : 1965  MRN: [id number]  Referring provider: Mitul Bryan MD  Dx:   Encounter Diagnosis     ICD-10-CM    1  Bilateral primary osteoarthritis of knee M17 0                   Subjective: The patient received cortisone at her B/L knees yesterday  Today she is not feeling any pain at her knees  Objective: See treatment diary below      Assessment: The patient tolerated all activities well today  The patient needed minimal verbal cues for proper posture and technique to perform the exercises properly  Some of the WB exercises were held due to the patient receiving cortisone yesterday  There were no complaints of increased pain or problems after the session today  The patient will benefit from continued skilled physical therapy to progress towards achieving patient centered goals  Plan: Continue per plan of care  Re-Evaluation: 19  PRECAUTIONS: DM/ ADHESIVE ALLERGY  Knee Specialty Daily Treatment Diary     Manual  19    PROM flex/ext Gentle 3x:15 flex/ext R knee inv foot w/ flex 3x:15  B/L flex/ext 3x:15 Right knee inv foot w/ flexion  B/L flex/ext  3x:15 ea Right knee inv foot w/ flexion  B/L flex/ext  3x:15ea    Hamstring stretch :30x3 B/L :30 x 3 B/L :30x3 B/L B/L :30x3            R fibular head A/P mob        Patellar mobs reviewed reviewed Performed L LE and instructed again reviewed    Knee stretch on edge of mat EXT x1 mins EXT x 1min ea EXT/FLEX 1 min ea B/L ( total 4 mins) EXT/FLEX x 1 min ea B/L        Exercise Diary  19    Nu Step S=10 TOWEL ROLL @ Back L3 x10 mins roll L3  5 mins x 2  Roll @ L/B L3 5 mins x 2 L3 x5 mins x 2    Biodex Bike        EXT board  *2 mins R LE 2 mins 3 mins    PRONE PRESS UP  x10 x10 x10    Heel slides flex/abd reviewed  X15 combo heelslides  Flex/qs/abd x10    Bridges reviewed 2x10 x25 x25    TKE x5 with ball p! Clam shells    3x10    QS roll @ knee        SLR all planes        SAQ  PRONE QS 2x10 Prone QS  2x10 NV    LAQ        Total Gym Squats  *x10      Calf stretch        Standing hamstring curls        COMBO hip EXT and Abd        Total gym squats (deep)        Hurdles walk 25ft x4  25ft x 2      Step up 10x ea fwd/side 10xea fwd/side B/L      SLB 1x:10 ea       Mini Squat        [place]        sidestepping 25ft x4 no cane 25 ft x 2      Heel-toe amb        Back ext 10x       Mirror Amb with SPC No SPC 25ft x8  Walking without AD in gym 100ft x 2 In gym 100ft x 1    Up/Down Steps with SOS            Modalities 12/20/19 12/23/19 12/24/19 12/27/19    CP B/L KNEE x7 mins x7 mins declined declined

## 2019-12-30 ENCOUNTER — OFFICE VISIT (OUTPATIENT)
Dept: PHYSICAL THERAPY | Facility: CLINIC | Age: 54
End: 2019-12-30
Payer: COMMERCIAL

## 2019-12-30 ENCOUNTER — APPOINTMENT (OUTPATIENT)
Dept: LAB | Facility: CLINIC | Age: 54
End: 2019-12-30
Payer: COMMERCIAL

## 2019-12-30 ENCOUNTER — TRANSCRIBE ORDERS (OUTPATIENT)
Dept: LAB | Facility: CLINIC | Age: 54
End: 2019-12-30

## 2019-12-30 DIAGNOSIS — E11.65 UNCONTROLLED TYPE 2 DIABETES MELLITUS WITH HYPERGLYCEMIA (HCC): ICD-10-CM

## 2019-12-30 DIAGNOSIS — M17.0 BILATERAL PRIMARY OSTEOARTHRITIS OF KNEE: Primary | ICD-10-CM

## 2019-12-30 DIAGNOSIS — E78.2 MIXED HYPERLIPIDEMIA: Primary | ICD-10-CM

## 2019-12-30 DIAGNOSIS — E78.2 MIXED HYPERLIPIDEMIA: ICD-10-CM

## 2019-12-30 LAB
ALBUMIN SERPL BCP-MCNC: 3.6 G/DL (ref 3.5–5)
ALP SERPL-CCNC: 69 U/L (ref 46–116)
ALT SERPL W P-5'-P-CCNC: 48 U/L (ref 12–78)
ANION GAP SERPL CALCULATED.3IONS-SCNC: 5 MMOL/L (ref 4–13)
AST SERPL W P-5'-P-CCNC: 17 U/L (ref 5–45)
BILIRUB SERPL-MCNC: 0.51 MG/DL (ref 0.2–1)
BUN SERPL-MCNC: 15 MG/DL (ref 5–25)
CALCIUM SERPL-MCNC: 9.7 MG/DL (ref 8.3–10.1)
CHLORIDE SERPL-SCNC: 102 MMOL/L (ref 100–108)
CHOLEST SERPL-MCNC: 180 MG/DL (ref 50–200)
CO2 SERPL-SCNC: 35 MMOL/L (ref 21–32)
CREAT SERPL-MCNC: 0.71 MG/DL (ref 0.6–1.3)
EST. AVERAGE GLUCOSE BLD GHB EST-MCNC: 117 MG/DL
GFR SERPL CREATININE-BSD FRML MDRD: 98 ML/MIN/1.73SQ M
GLUCOSE P FAST SERPL-MCNC: 113 MG/DL (ref 65–99)
HBA1C MFR BLD: 5.7 % (ref 4.2–6.3)
HDLC SERPL-MCNC: 40 MG/DL
LDLC SERPL CALC-MCNC: 114 MG/DL (ref 0–100)
NONHDLC SERPL-MCNC: 140 MG/DL
POTASSIUM SERPL-SCNC: 3 MMOL/L (ref 3.5–5.3)
PROT SERPL-MCNC: 7.2 G/DL (ref 6.4–8.2)
SODIUM SERPL-SCNC: 142 MMOL/L (ref 136–145)
TRIGL SERPL-MCNC: 132 MG/DL

## 2019-12-30 PROCEDURE — 97110 THERAPEUTIC EXERCISES: CPT

## 2019-12-30 PROCEDURE — 80053 COMPREHEN METABOLIC PANEL: CPT

## 2019-12-30 PROCEDURE — 80061 LIPID PANEL: CPT

## 2019-12-30 PROCEDURE — 36415 COLL VENOUS BLD VENIPUNCTURE: CPT

## 2019-12-30 PROCEDURE — 83036 HEMOGLOBIN GLYCOSYLATED A1C: CPT

## 2019-12-30 PROCEDURE — 97140 MANUAL THERAPY 1/> REGIONS: CPT

## 2019-12-30 NOTE — PROGRESS NOTES
Daily Note     Today's date: 2019  Patient name: Justyna Patel  : 1965  MRN: [id number]  Referring provider: Marisol Carcamo MD  Dx:   Encounter Diagnosis     ICD-10-CM    1  Bilateral primary osteoarthritis of knee M17 0                   Subjective: Patient states her knees are good and pain is 1/10  Her back hurts her today (5/10)  She woke up that way  Objective: See treatment diary below      Assessment: Tolerated treatment well  Patient would benefit from continued PT for stretching and strengthening  Patient was able to increase exercises without difficulty or increased pain  Patient felt better  No pain in the knees and her back pain went down to 3/10  Plan: Continue per plan of care  Progress treatment as tolerated  Re-Evaluation: 19  PRECAUTIONS: DM/ ADHESIVE ALLERGY  Knee Specialty Daily Treatment Diary     Manual  19   PROM flex/ext Gentle 3x:15 flex/ext R knee inv foot w/ flex 3x:15  B/L flex/ext 3x:15 Right knee inv foot w/ flexion  B/L flex/ext  3x:15 ea Right knee inv foot w/ flexion  B/L flex/ext  3x:15ea Right knee inv foot w/ flexion  B/L flex/ext  3x:15ea   Hamstring stretch :30x3 B/L :30 x 3 B/L :30x3 B/L B/L :30x3 B/L :30x3           R fibular head A/P mob        Patellar mobs reviewed reviewed Performed L LE and instructed again reviewed reviewed   Knee stretch on edge of mat EXT x1 mins EXT x 1min ea EXT/FLEX 1 min ea B/L ( total 4 mins) EXT/FLEX x 1 min ea B/L EXT/FLEX x 1 min ea B/L       Exercise Diary  19   Nu Step S=10 TOWEL ROLL @ Back L3 x10 mins roll L3  5 mins x 2  Roll @ L/B L3 5 mins x 2 L3 x5 mins x 2 L3 x5 min x2   Biodex Bike        EXT board  *2 mins R LE 2 mins 3 mins 4 mins   PRONE PRESS UP  x10 x10 x10 x10   Heel slides flex/abd reviewed  X15 combo heelslides  Flex/qs/abd x10 x10   Bridges reviewed 2x10 x25 x25 3x10   TKE x5 with ball p!        Clam shells 3x10 3x10   QS roll @ knee        SLR all planes        SAQ  PRONE QS 2x10 Prone QS  2x10 NV Prone QS  2x10   LAQ        Total Gym Squats  *x10      Calf stretch        Standing hamstring curls        COMBO hip EXT and Abd        Total gym squats (deep)        Hurdles walk 25ft x4  25ft x 2      Step up 10x ea fwd/side 10xea fwd/side B/L      SLB 1x:10 ea       Mini Squat        [place]        sidestepping 25ft x4 no cane 25 ft x 2      Heel-toe amb        Back ext 10x       Mirror Amb with SPC No SPC 25ft x8  Walking without AD in gym 100ft x 2 In gym 100ft x 1 In gym 100ft x 1   Up/Down Steps with SOS            Modalities 12/20/19 12/23/19 12/24/19 12/27/19 12/30/19   CP B/L KNEE x7 mins x7 mins declined declined

## 2019-12-31 ENCOUNTER — OFFICE VISIT (OUTPATIENT)
Dept: PHYSICAL THERAPY | Facility: CLINIC | Age: 54
End: 2019-12-31
Payer: COMMERCIAL

## 2019-12-31 DIAGNOSIS — M17.0 BILATERAL PRIMARY OSTEOARTHRITIS OF KNEE: Primary | ICD-10-CM

## 2019-12-31 PROCEDURE — 97110 THERAPEUTIC EXERCISES: CPT | Performed by: PHYSICAL THERAPIST

## 2019-12-31 PROCEDURE — 97140 MANUAL THERAPY 1/> REGIONS: CPT | Performed by: PHYSICAL THERAPIST

## 2020-01-02 NOTE — PROGRESS NOTES
PT Re-Evaluation  and PT Discharge    Today's date: 1/3/2020  Patient name: Gwendolyn Peterson  : 1965  MRN: [id number]  Referring provider: Monse Ospina MD  Dx:   Encounter Diagnosis     ICD-10-CM    1  Bilateral primary osteoarthritis of knee M17 0                   Assessment  Assessment details: Gwendolyn Peterson is a 48 y o  female that has attended 16 sessions of physical therapy presents for a re-evaluation today  During the examination the patient demonstrates: improved B/L knee ROM, improved B/L LE strength, improved gait mechanics, decreased B/L knee pain, and improved activity tolerance  The patient has made functional gains since last assessment  The patient is now able to walk community distances without any assistive device versus using a SPC  The patient is able to ambulate up/down the steps with step over step techniques and is performing all ADL's with much less difficulty since receiving physical therapy  The patient will be discharged from outpatient PT to an independent home exercise plan  Symptom irritability: lowBarriers to therapy: significant medical history  Understanding of Dx/Px/POC: good   Prognosis: good  Prognosis details: Decreased due to significant medical history    Goals  STG: Achieve in 4 weeks  1  Patient's B/L knee pain at worst less than 4/10 to allow for proper gait without crutches  MET 1/3/20  2  Patient's B/L knee ROM improve by 10-15 degrees to improve ambulating up/down the steps  MET/19  3  LE MMT improve to > 4-/5 all motions tested to improve ADL/recreational activities  MET 1/3/20  4  Timed up and go score improve to less than 14 seconds  MET 1/3/20  LTG:  Achieve in 4-8 weeks  1  Patient's knee FOTO score improve by 25% from last assessment  PARTIALLY MET ( improved 20%) 1/3/20  2  Patient achieve personal goal of walking without B/L crutches and walk up/down the steps more normally without knee pain > 4/10  MET 1/3/20  3   Patient to achieve independence with home exercise plan  MET 1/3/20  4  30 second sit to stand test score improve by 5 stands to a total of 10 stands to indicate improved transfers  MET 12/20/19      Plan  Plan details: D/C PT TO AN INDEPENDENT [facility]  Treatment plan discussed with: PTA and patient        Subjective Evaluation    History of Present Illness  Date of onset: 9/7/2016  Mechanism of injury: SUBJECTIVE: 1/3/20: The patient is now walking longer distances without any assistive devices  At times she uses her [facility] depending on how the knees are feeling  The patient is ambulating up/down the steps with less difficulty since last progress note  The patient is starting to go step over step technique  The patient is independent with her home exercises and would like to discharge formal therapy  SUBJECTIVE: 12/19/19:  The patient reports she is now able to walk community distances with a SPC versus the axillary crutches  The patient is able to tolerate standing, transfers, prolonged walking, and steps with less pain/difficulty  The patient is still having walking difficulty walking without any assistive devices, difficulty with ambulating up/down the steps normally, and she is unable to perform any squatting/kneeling activities  The patient feels she will benefit from continuing skilled outpatient physical therapy  INJURY HISTORY:  Jenna June is a 48 y o  female that presents to outpatient physical therapy with complaints of pain and lacking ROM at her B/L knees R>L  The patient notes difficulty bearing weight with her R LE  - thus requiring axillary crutches for all ambulation  The patient reports in 2016 she was diagnosed with right knee mensicus tears which she received a knee scope and a lateral release  After that surgery the patient had difficulty ambulating and feels she never fully recovered afterwards    The patient notes her B/L knees have gotten worse over the past 3 years most notably the past 2 months  The patient has been receiving multiple cortisone shots over this span of time  The patient consulted with Dr Mack Richmond who ordered outpatient PT to decrease pain and improve the patient's function  The patient recently underwent weight loss surgery - the patient had to stop taking Voltaren gel and was immobilized which did exacerbate the patient's knee pain  The patient is now allowed to resume the Voltaren gel with PPI med  The patient's main goal for physical therapy is to walk without crutches and walk up and down the steps more normally ( difficulty with L knee)       Past surgical History: 2016 hx of R knee scope with lateral release          Recurrent probem    Pain  Current pain ratin  At best pain ratin  At worst pain ratin  Location: R anterio knee joint line, R lateral knee joint line; L anterior knee  Quality: sharp and dull ache  Relieving factors: rest, medications and ice  Progression: improved    Social Support  Steps to enter house: yes  5  Stairs in house: yes   13  Lives in: multiple-level home  Lives with: spouse and young children    Employment status: working ( - works from home)  Hand dominance: left    Patient Goals  Patient goal: walk up down the steps more normally and walk without crutches ( MET)        Objective     Palpation     Additional Palpation Details  No muscle tenderness    Tenderness     Additional Tenderness Details  No tenderness    Neurological Testing     Sensation     Knee   Left Knee   Intact: light touch    Right Knee   Intact: light touch     Active Range of Motion   Left Knee   Flexion: 118 degrees   Extension: -3 degrees     Right Knee   Flexion: 126 degrees   Extension: -5 degrees     Additional Active Range of Motion Details  L: 15 degree improvement  R: 14 degree improvement    Passive Range of Motion   Left Knee   Flexion: 118 degrees   Extension: -2 degrees     Right Knee   Flexion: 121 degrees   Extension: -4 degrees Mobility   Patellar Mobility:   Left Knee   WFL: medial, lateral, superior and inferior  Right Knee   WFL: medial, lateral, superior and inferior    Additional Mobility Details  Improved mobility B/L patella    Strength/Myotome Testing     Left Hip   Planes of Motion   Flexion: 4  Extension: 4  Abduction: 4+  Adduction: 4+    Right Hip   Planes of Motion   Flexion: 4  Extension: 4  Abduction: 4+  Adduction: 4+    Left Knee   Flexion: 5  Extension: 5  Quadriceps contraction: good    Right Knee   Flexion: 4  Extension: 4-  Quadriceps contraction: fair    Tests     Additional Tests Details  FOTO: 46% function ( improved 20% since last assessment)    Gait Impairments: The patient is ambulating without assistive device with improved heel strike / toe rollover  The patient demonstrates improved right terminal knee extension with ambulation  The patient does demonstrate a mild RLE limp with walking      Timed Up and Go: 9 seconds with SPC ( IMPROVED 18 SECONDS)    30 second sit to stand test: 11 stands NO HANDS (improved 6 stands)    SLB: B/L 10 seconds without LOB - previously was unable to tolerate    Swelling     Left Knee Girth Measurement (cm)   Joint line: 41 cm    Right Knee Girth Measurement (cm)   Joint line: 42 5 cm               Re-Evaluation: 1/16/19  PRECAUTIONS: DM/ ADHESIVE ALLERGY  Knee Specialty Daily Treatment Diary     Manual  12/31/19 1/3/20  12/27/19 12/30/19   PROM flex/ext Right knee inv foot w/flexion  B/L 3x:15 Flex/ext 3x:15  Right knee inv foot w/ flexion  B/L flex/ext  3x:15ea Right knee inv foot w/ flexion  B/L flex/ext  3x:15ea   Hamstring stretch B/L 3x:30 B/L 3x:30  B/L :30x3 B/L :30x3           R fibular head A/P mob        Patellar mobs  reviewed  reviewed reviewed   Knee stretch on edge of mat Performed 1 min ea flex/ext Performed 1 min flex/ext  EXT/FLEX x 1 min ea B/L EXT/FLEX x 1 min ea B/L       Exercise Diary  12/31/19 1/3/20  12/27/19 12/30/19   Nu Step S=10 TOWEL ROLL @ Back L3 x10 mins roll @ back L3 2x5 mins  L3 x5 mins x 2 L3 x5 min x2   Biodex Bike        EXT board HOLD X 3mins  3 mins 4 mins   PRONE PRESS UP x10 x10  x10 x10   Heel slides flex/abd x20 heelslide/qs combo B/L x10 B/L  x10 x10   Bridges 3x10 3x10  x25 3x10   TKE        Clam shells 3x10 3x10  3x10 3x10   QS roll @ knee        SLR all planes x10 x10      SAQ Prone 2x10 Prone 2x10  NV Prone QS  2x10   LAQ        Total Gym Squats        Calf stretch        Standing hamstring curls        COMBO hip EXT and Abd        Total gym squats (deep)                Step up        SLB  2x:10      Mini Squat        Hurdles Amb 25ft x 2       sidestepping        Heel-toe amb        Back ext        Mirror Amb with SPC    In gym 100ft x 1 In gym 100ft x 1   Up/Down Steps with SOS            Modalities  1/3/20  12/27/19 12/30/19   CP B/L KNEE  declined  declined                                The patient was given a new home exercise plan with written handout, pictures, and verbal instruction  The patient accepts and understands the new home activities

## 2020-01-03 ENCOUNTER — TRANSCRIBE ORDERS (OUTPATIENT)
Dept: PHYSICAL THERAPY | Facility: CLINIC | Age: 55
End: 2020-01-03

## 2020-01-03 ENCOUNTER — EVALUATION (OUTPATIENT)
Dept: PHYSICAL THERAPY | Facility: CLINIC | Age: 55
End: 2020-01-03
Payer: COMMERCIAL

## 2020-01-03 DIAGNOSIS — M17.0 BILATERAL PRIMARY OSTEOARTHRITIS OF KNEE: Primary | ICD-10-CM

## 2020-01-03 PROCEDURE — 97535 SELF CARE MNGMENT TRAINING: CPT | Performed by: PHYSICAL THERAPIST

## 2020-01-03 PROCEDURE — 97140 MANUAL THERAPY 1/> REGIONS: CPT | Performed by: PHYSICAL THERAPIST

## 2020-01-03 PROCEDURE — 97110 THERAPEUTIC EXERCISES: CPT | Performed by: PHYSICAL THERAPIST

## 2020-01-03 NOTE — LETTER
January 3, 3285    [address] [id number]    Patient: Paula Champagne   YOB: 1965   Date of Visit: 1/3/2020     Encounter Diagnosis     ICD-10-CM    1  Bilateral primary osteoarthritis of knee M17 0        Dear Dr Schneider Agent: Thank you for your recent referral of Paula Champagne  Please review the attached evaluation summary from Rosita's recent visit  Please verify that you agree with the plan of care by signing the attached order  If you have any questions or concerns, please do not hesitate to call  I sincerely appreciate the opportunity to share in the care of one of your patients and hope to have another opportunity to work with you in the near future  Sincerely,    Kori Jason PT      Referring Provider:      I certify that I have read the below Plan of Care and certify the need for these services furnished under this plan of treatment while under my care  MD [address] [facility]: [phone]          PT Re-Evaluation  and PT Discharge    Today's date: 1/3/2020  Patient name: Paula Champagne  : 1965  MRN: [id number]  Referring provider: Toby Galvan MD  Dx:   Encounter Diagnosis     ICD-10-CM    1  Bilateral primary osteoarthritis of knee M17 0                   Assessment  Assessment details: Paula Champagne is a 48 y o  female that has attended 16 sessions of physical therapy presents for a re-evaluation today  During the examination the patient demonstrates: improved B/L knee ROM, improved B/L LE strength, improved gait mechanics, decreased B/L knee pain, and improved activity tolerance  The patient has made functional gains since last assessment  The patient is now able to walk community distances without any assistive device versus using a SPC    The patient is able to ambulate up/down the steps with step over step techniques and is performing all ADL's with much less difficulty since receiving physical therapy  The patient will be discharged from outpatient PT to an independent home exercise plan  Symptom irritability: lowBarriers to therapy: significant medical history  Understanding of Dx/Px/POC: good   Prognosis: good  Prognosis details: Decreased due to significant medical history    Goals  STG: Achieve in 4 weeks  1  Patient's B/L knee pain at worst less than 4/10 to allow for proper gait without crutches  MET 1/3/20  2  Patient's B/L knee ROM improve by 10-15 degrees to improve ambulating up/down the steps  MET12/120/19  3  LE MMT improve to > 4-/5 all motions tested to improve ADL/recreational activities  MET 1/3/20  4  Timed up and go score improve to less than 14 seconds  MET 1/3/20  LTG:  Achieve in 4-8 weeks  1  Patient's knee FOTO score improve by 25% from last assessment  PARTIALLY MET ( improved 20%) 1/3/20  2  Patient achieve personal goal of walking without B/L crutches and walk up/down the steps more normally without knee pain > 4/10  MET 1/3/20  3  Patient to achieve independence with home exercise plan  MET 1/3/20  4  30 second sit to stand test score improve by 5 stands to a total of 10 stands to indicate improved transfers  MET 12/20/19      Plan  Plan details: D/C PT TO AN INDEPENDENT [facility]  Treatment plan discussed with: PTA and patient        Subjective Evaluation    History of Present Illness  Date of onset: 9/7/2016  Mechanism of injury: SUBJECTIVE: 1/3/20: The patient is now walking longer distances without any assistive devices  At times she uses her [facility] depending on how the knees are feeling  The patient is ambulating up/down the steps with less difficulty since last progress note  The patient is starting to go step over step technique  The patient is independent with her home exercises and would like to discharge formal therapy          SUBJECTIVE: 12/19/19:  The patient reports she is now able to walk community distances with a SPC versus the axillary crutches  The patient is able to tolerate standing, transfers, prolonged walking, and steps with less pain/difficulty  The patient is still having walking difficulty walking without any assistive devices, difficulty with ambulating up/down the steps normally, and she is unable to perform any squatting/kneeling activities  The patient feels she will benefit from continuing skilled outpatient physical therapy  INJURY HISTORY:  Jenna June is a 48 y o  female that presents to outpatient physical therapy with complaints of pain and lacking ROM at her B/L knees R>L  The patient notes difficulty bearing weight with her R LE  - thus requiring axillary crutches for all ambulation  The patient reports in 2016 she was diagnosed with right knee mensicus tears which she received a knee scope and a lateral release  After that surgery the patient had difficulty ambulating and feels she never fully recovered afterwards  The patient notes her B/L knees have gotten worse over the past 3 years most notably the past 2 months  The patient has been receiving multiple cortisone shots over this span of time  The patient consulted with Dr Amrita Garcia who ordered outpatient PT to decrease pain and improve the patient's function  The patient recently underwent weight loss surgery - the patient had to stop taking Voltaren gel and was immobilized which did exacerbate the patient's knee pain  The patient is now allowed to resume the Voltaren gel with PPI med  The patient's main goal for physical therapy is to walk without crutches and walk up and down the steps more normally ( difficulty with L knee)       Past surgical History: 2016 hx of R knee scope with lateral release          Recurrent probem    Pain  Current pain ratin  At best pain ratin  At worst pain ratin  Location: R anterio knee joint line, R lateral knee joint line; L anterior knee  Quality: sharp and dull ache  Relieving factors: rest, medications and ice  Progression: improved    Social Support  Steps to enter house: yes  5  Stairs in house: yes   13  Lives in: multiple-level home  Lives with: spouse and young children    Employment status: working ( - works from home)  Hand dominance: left    Patient Goals  Patient goal: walk up down the steps more normally and walk without crutches ( MET)        Objective     Palpation     Additional Palpation Details  No muscle tenderness    Tenderness     Additional Tenderness Details  No tenderness    Neurological Testing     Sensation     Knee   Left Knee   Intact: light touch    Right Knee   Intact: light touch     Active Range of Motion   Left Knee   Flexion: 118 degrees   Extension: -3 degrees     Right Knee   Flexion: 126 degrees   Extension: -5 degrees     Additional Active Range of Motion Details  L: 15 degree improvement  R: 14 degree improvement    Passive Range of Motion   Left Knee   Flexion: 118 degrees   Extension: -2 degrees     Right Knee   Flexion: 121 degrees   Extension: -4 degrees     Mobility   Patellar Mobility:   Left Knee   WFL: medial, lateral, superior and inferior  Right Knee   WFL: medial, lateral, superior and inferior    Additional Mobility Details  Improved mobility B/L patella    Strength/Myotome Testing     Left Hip   Planes of Motion   Flexion: 4  Extension: 4  Abduction: 4+  Adduction: 4+    Right Hip   Planes of Motion   Flexion: 4  Extension: 4  Abduction: 4+  Adduction: 4+    Left Knee   Flexion: 5  Extension: 5  Quadriceps contraction: good    Right Knee   Flexion: 4  Extension: 4-  Quadriceps contraction: fair    Tests     Additional Tests Details  FOTO: 46% function ( improved 20% since last assessment)    Gait Impairments: The patient is ambulating without assistive device with improved heel strike / toe rollover  The patient demonstrates improved right terminal knee extension with ambulation    The patient does demonstrate a mild RLE limp with walking      Timed Up and Go: 9 seconds with SPC ( IMPROVED 18 SECONDS)    30 second sit to stand test: 11 stands NO HANDS (improved 6 stands)    SLB: B/L 10 seconds without LOB - previously was unable to tolerate    Swelling     Left Knee Girth Measurement (cm)   Joint line: 41 cm    Right Knee Girth Measurement (cm)   Joint line: 42 5 cm               Re-Evaluation: 1/16/19  PRECAUTIONS: DM/ ADHESIVE ALLERGY  Knee Specialty Daily Treatment Diary     Manual  12/31/19 1/3/20  12/27/19 12/30/19   PROM flex/ext Right knee inv foot w/flexion  B/L 3x:15 Flex/ext 3x:15  Right knee inv foot w/ flexion  B/L flex/ext  3x:15ea Right knee inv foot w/ flexion  B/L flex/ext  3x:15ea   Hamstring stretch B/L 3x:30 B/L 3x:30  B/L :30x3 B/L :30x3           R fibular head A/P mob        Patellar mobs  reviewed  reviewed reviewed   Knee stretch on edge of mat Performed 1 min ea flex/ext Performed 1 min flex/ext  EXT/FLEX x 1 min ea B/L EXT/FLEX x 1 min ea B/L       Exercise Diary  12/31/19 1/3/20  12/27/19 12/30/19   Nu Step S=10 TOWEL ROLL @ Back L3 x10 mins roll @ back L3 2x5 mins  L3 x5 mins x 2 L3 x5 min x2   Biodex Bike        EXT board HOLD X 3mins  3 mins 4 mins   PRONE PRESS UP x10 x10  x10 x10   Heel slides flex/abd x20 heelslide/qs combo B/L x10 B/L  x10 x10   Bridges 3x10 3x10  x25 3x10   TKE        Clam shells 3x10 3x10  3x10 3x10   QS roll @ knee        SLR all planes x10 x10      SAQ Prone 2x10 Prone 2x10  NV Prone QS  2x10   LAQ        Total Gym Squats        Calf stretch        Standing hamstring curls        COMBO hip EXT and Abd        Total gym squats (deep)                Step up        SLB  2x:10      Mini Squat        Hurdles Amb 25ft x 2       sidestepping        Heel-toe amb        Back ext        Mirror Amb with SPC    In gym 100ft x 1 In gym 100ft x 1   Up/Down Steps with SOS            Modalities  1/3/20  12/27/19 12/30/19   CP B/L KNEE  declined  declined The patient was given a new home exercise plan with written handout, pictures, and verbal instruction  The patient accepts and understands the new home activities

## 2020-01-20 ENCOUNTER — TELEPHONE (OUTPATIENT)
Dept: SLEEP CENTER | Facility: CLINIC | Age: 55
End: 2020-01-20

## 2020-01-20 DIAGNOSIS — G47.33 OBSTRUCTIVE SLEEP APNEA TREATED WITH CONTINUOUS POSITIVE AIRWAY PRESSURE (CPAP): Primary | ICD-10-CM

## 2020-01-20 DIAGNOSIS — Z99.89 OBSTRUCTIVE SLEEP APNEA TREATED WITH CONTINUOUS POSITIVE AIRWAY PRESSURE (CPAP): Primary | ICD-10-CM

## 2020-01-20 NOTE — TELEPHONE ENCOUNTER
Patient left message, states she was having issues with her pressures, states she has lost a significant amount of weight, at her las visit Grace Jeffrys changed her pressure to 7-14  Feels that it is too much pressure and would like her pressure changed to 7-12  States that it is uncomfortable and has not been using her APAP  I spoke with patient, states she had weight loss fptiksu01/21/19, she really didn't use it immediately after surgery, but when she started using it there was too much pressure and was uncomfortable  States she has been using it sporadically since her surgery  Patient states since her surgery lost 52 lbs, overall lost 92 lbs  Next visit 9/17/20    Please advise

## 2020-01-21 ENCOUNTER — TELEPHONE (OUTPATIENT)
Dept: SLEEP CENTER | Facility: CLINIC | Age: 55
End: 2020-01-21

## 2020-01-21 NOTE — TELEPHONE ENCOUNTER
Pressure change order placed for 5-12cm of water pressure, which will allow equipment to begin at a lower pressure and increase to pressure needed to maintain patent airway during sleep

## 2020-01-30 PROBLEM — K91.2 POSTSURGICAL MALABSORPTION: Chronic | Status: ACTIVE | Noted: 2020-01-30

## 2020-01-30 PROBLEM — K91.2 POSTSURGICAL MALABSORPTION: Status: ACTIVE | Noted: 2020-01-30

## 2020-01-30 PROBLEM — Z98.890 STATUS POST DIGESTIVE SYSTEM SURGERY: Chronic | Status: ACTIVE | Noted: 2020-01-30

## 2020-01-30 PROBLEM — Z98.890 STATUS POST DIGESTIVE SYSTEM SURGERY: Status: ACTIVE | Noted: 2020-01-30

## 2020-01-31 ENCOUNTER — OFFICE VISIT (OUTPATIENT)
Dept: BARIATRICS | Facility: CLINIC | Age: 55
End: 2020-01-31
Payer: COMMERCIAL

## 2020-01-31 VITALS
WEIGHT: 210.5 LBS | HEART RATE: 63 BPM | BODY MASS INDEX: 35.94 KG/M2 | HEIGHT: 64 IN | TEMPERATURE: 98.1 F | SYSTOLIC BLOOD PRESSURE: 138 MMHG | DIASTOLIC BLOOD PRESSURE: 78 MMHG

## 2020-01-31 DIAGNOSIS — G47.33 OBSTRUCTIVE SLEEP APNEA SYNDROME: ICD-10-CM

## 2020-01-31 DIAGNOSIS — E11.8 TYPE 2 DIABETES MELLITUS WITH COMPLICATION (HCC): ICD-10-CM

## 2020-01-31 DIAGNOSIS — K91.2 POSTSURGICAL MALABSORPTION: Chronic | ICD-10-CM

## 2020-01-31 DIAGNOSIS — E78.2 MIXED HYPERLIPIDEMIA: ICD-10-CM

## 2020-01-31 DIAGNOSIS — Z98.890 STATUS POST DIGESTIVE SYSTEM SURGERY: Primary | ICD-10-CM

## 2020-01-31 DIAGNOSIS — I10 ESSENTIAL HYPERTENSION: ICD-10-CM

## 2020-01-31 DIAGNOSIS — G47.33 OBSTRUCTIVE SLEEP APNEA TREATED WITH CONTINUOUS POSITIVE AIRWAY PRESSURE (CPAP): ICD-10-CM

## 2020-01-31 DIAGNOSIS — K21.9 GERD (GASTROESOPHAGEAL REFLUX DISEASE): ICD-10-CM

## 2020-01-31 DIAGNOSIS — Z99.89 OBSTRUCTIVE SLEEP APNEA TREATED WITH CONTINUOUS POSITIVE AIRWAY PRESSURE (CPAP): ICD-10-CM

## 2020-01-31 DIAGNOSIS — R12 HEART BURN: ICD-10-CM

## 2020-01-31 PROBLEM — E66.812 OBESITY, CLASS II, BMI 35-39.9: Status: ACTIVE | Noted: 2020-01-31

## 2020-01-31 PROBLEM — E66.9 OBESITY, CLASS II, BMI 35-39.9: Status: ACTIVE | Noted: 2020-01-31

## 2020-01-31 PROCEDURE — 3078F DIAST BP <80 MM HG: CPT | Performed by: PHYSICIAN ASSISTANT

## 2020-01-31 PROCEDURE — 3075F SYST BP GE 130 - 139MM HG: CPT | Performed by: PHYSICIAN ASSISTANT

## 2020-01-31 PROCEDURE — 99214 OFFICE O/P EST MOD 30 MIN: CPT | Performed by: PHYSICIAN ASSISTANT

## 2020-01-31 RX ORDER — HYDROCHLOROTHIAZIDE 25 MG/1
TABLET ORAL
COMMUNITY
Start: 2019-12-27 | End: 2020-11-05

## 2020-01-31 RX ORDER — AZILSARTAN KAMEDOXOMIL 80 MG/1
TABLET ORAL
COMMUNITY
Start: 2019-12-27 | End: 2020-11-05

## 2020-01-31 RX ORDER — OMEPRAZOLE 20 MG/1
20 CAPSULE, DELAYED RELEASE ORAL 2 TIMES DAILY
Qty: 180 CAPSULE | Refills: 2 | Status: SHIPPED | OUTPATIENT
Start: 2020-01-31 | End: 2020-11-05 | Stop reason: SDUPTHER

## 2020-01-31 NOTE — ASSESSMENT & PLAN NOTE
Improved from morbid obesity at her first post-op visit with her surgeon with bmi of 41 2- to current bmi of 36 1-obesity ii

## 2020-01-31 NOTE — ASSESSMENT & PLAN NOTE
She notes around one month ago she had to go back on her antihypertensive medication  Bp appears acceptable in the office today    Advised that with continued weight loss blood pressure can come down  Advised on symptoms of hypotension and if this occurs to follow-up with PCP for further management since blood pressure medications may need to be adjusted at that time

## 2020-01-31 NOTE — PATIENT INSTRUCTIONS
It was great to meet you today  Keep up the good work  For heart burn control  Avoid more than 2 cups of caffeine per day-regular coffee/tea  If you are drinking more than this gradually decrease the amount until it is less than 2 cups per day  Raise the head of the bed up especially if you have symptoms at night  You can do this by using a wedge pillow at the top of the bed OR using bed posts at the head of the bed only-keeping your body at a slant-so that your head is above your stomach all night long  Avoid eating and drinking within 2 hours of laying flat  Avoid chocolate, highly spicy foods, chili powder, pepper, peppers, high citrus foods-like tomato/tomato paste, high onion, high garlic intakes  Follow-up in 3 months  We kindly ask that you arrive 15 minutes before your scheduled appointment time with your provider to allow for our staff to room you and check your vital signs and update your chart  We thank you for your patience at your visit  Follow diet as discussed  Get lab work done prior to next office visit  It is recommended to check with your insurance BEFORE getting labs done to make sure they are covered by your policy  Make sure to HOLD any multivitamins that may contain biotin and any biotin supplements FOR 5 DAYS before any labs since it can affect the results  IMPORTANT if you have a [facility] "[employer] app" account, you will receive a letter of your vitamin/mineral results through the computer  Please watch for an update to your chart-since recommendations for supplement adjustments will be sent to you this way  Follow vitamin  and mineral recommendations as reviewed with you  Bariatric vitamins are highly recommended  Vitamins are important for a life-time to avoid low levels which can lead to other medical problems    Exercise as tolerated  Call our office if you have any problems with abdominal pain especially associated with fever, chills, nausea, vomiting or any other concerns  All  Post-bariatric surgery patients should be aware that very small quantities of any alcohol  can cause impairment and it is very possible not to feel the effect  The effect can be in the system for several hours  It is also a stomach irritant  It is advised to AVOID alcohol, Nonsteroidal antiinflammatory drugs (NSAIDS) and nicotine of all forms   Any of these can cause stomach irritation/pain  Recommendation : Most, if not all post-gastric surgery patients would benefit from having a regular counselor for at least 2 years post-op to help with need for multiple life-style changes  If you want to do this and need help finding a regular counselor you can also make a follow-up with our  to help you find one

## 2020-01-31 NOTE — ASSESSMENT & PLAN NOTE
She is experiencing heart burn if she forgets to take it -but also has some break-through when laying down-not with activity  Advised on diet for GERD control, raising head of bed with bed posts or wedge pillow    Will increase her ppi to twice daily  Advised she should let us know if symptoms are not controlled on medication

## 2020-01-31 NOTE — ASSESSMENT & PLAN NOTE
Is wearing Cpap   Advised that we recommend continuing to use Cpap for up to one year post-op unless otherwise advised by sleep medicine as this may have a beneficial effect on long-term weight loss    She notes she had to have pressures decreased last week

## 2020-01-31 NOTE — ASSESSMENT & PLAN NOTE
Lab Results   Component Value Date    HGBA1C 5 7 12/30/2019     Improved to pre-dm range/weight loss has helped  Continued weight loss may further improve the levels

## 2020-01-31 NOTE — ASSESSMENT & PLAN NOTE
-s/p Vertical Sleeve Gastrectomy with Dr Mccrary Level on 10/21/2019  Overall doing Well with her weight loss  She is experiencing heart burn-if she misses her ppi dose and notes some break-through if she eats/drinks close to bedtime and some symptoms with laying down  Symptoms now worsened with activity    Initial:270 7 lb  Current:210 5 lb  EWL:43%  Vance:Current  Current BMI is Body mass index is 36 13 kg/m²      Tolerating a regular diet-yes  Eating at least 60 grams of protein per day-yes  Following 30/60 minute rule with liquids-yes  Drinking at least 64 ounces of fluid per day-yes  Drinking carbonated beverages-no  Sufficient exercise-yes-a combination of cardio and weights-encouraged same  Using NSAIDs regularly-no  Using nicotine-no  Using alcohol-no    Colonoscopy/colon cancer screening is not up-to-date as reported by patient  -She has the cologuard at home-not interested in getting the colonoscopy  Now-advised on benefits-but encouraged to get the cologuard now

## 2020-01-31 NOTE — ASSESSMENT & PLAN NOTE
Malabsorption- patient is at risk for malabsorption of vitamins/minerals secondary to malabsorption from her procedure and restriction of intakes  Reviewed current supplements and advised on same    She is taking 2 bariatric advantage mvi   And 3-500 mg calcium citrate with D-I will order her routine labs now

## 2020-01-31 NOTE — PROGRESS NOTES
Assessment/Plan:    Status post digestive system surgery  -s/p Vertical Sleeve Gastrectomy with Dr Maame Goff on 10/21/2019  Overall doing Well with her weight loss  She is experiencing heart burn-if she misses her ppi dose and notes some break-through if she eats/drinks close to bedtime and some symptoms with laying down  Symptoms now worsened with activity    Initial:270 7 lb  Current:210 5 lb  EWL:43%  Vance:Current  Current BMI is Body mass index is 36 13 kg/m²      Tolerating a regular diet-yes  Eating at least 60 grams of protein per day-yes  Following 30/60 minute rule with liquids-yes  Drinking at least 64 ounces of fluid per day-yes  Drinking carbonated beverages-no  Sufficient exercise-yes-a combination of cardio and weights-encouraged same  Using NSAIDs regularly-no  Using nicotine-no  Using alcohol-no    Colonoscopy/colon cancer screening is not up-to-date as reported by patient  -She has the cologuard at home-not interested in getting the colonoscopy  Now-advised on benefits-but encouraged to get the cologuard now      Postsurgical malabsorption  Malabsorption- patient is at risk for malabsorption of vitamins/minerals secondary to malabsorption from her procedure and restriction of intakes  Reviewed current supplements and advised on same    She is taking 2 bariatric advantage mvi   And 3-500 mg calcium citrate with D-I will order her routine labs now    GERD (gastroesophageal reflux disease)  She is experiencing heart burn if she forgets to take it -but also has some break-through when laying down-not with activity  Advised on diet for GERD control, raising head of bed with bed posts or wedge pillow    Will increase her ppi to twice daily  Advised she should let us know if symptoms are not controlled on medication    [facility]    Lab Results   Component Value Date    HGBA1C 5 7 12/30/2019     Improved to pre-dm range/weight loss has helped  Continued weight loss may further improve the levels    Obstructive sleep apnea treated with continuous positive airway pressure (CPAP)  Is wearing Cpap  Advised that we recommend continuing to use Cpap for up to one year post-op unless otherwise advised by sleep medicine as this may have a beneficial effect on long-term weight loss    She notes she had to have pressures decreased last week    Essential hypertension  She notes around one month ago she had to go back on her antihypertensive medication  Bp appears acceptable in the office today    Advised that with continued weight loss blood pressure can come down  Advised on symptoms of hypotension and if this occurs to follow-up with PCP for further management since blood pressure medications may need to be adjusted at that time  Obesity, Class II, BMI 35-39 9  Improved from morbid obesity at her first post-op visit with her surgeon with bmi of 41 2- to current bmi of 36 1-obesity ii  Diagnoses and all orders for this visit:    Status post digestive system surgery  -     omeprazole (PriLOSEC) 20 mg delayed release capsule; Take 1 capsule (20 mg total) by mouth 2 (two) times a day  -     CBC and Platelet; Future  -     Copper Level; Future  -     Ferritin; Future  -     Folate; Future  -     Iron Saturation %; Future  -     PTH, intact; Future  -     Vitamin A; Future  -     Vitamin B1, whole blood; Future  -     Vitamin B12; Future  -     Vitamin D 25 hydroxy; Future  -     Zinc; Future    GERD (gastroesophageal reflux disease)  -     CBC and Platelet; Future  -     Copper Level; Future  -     Ferritin; Future  -     Folate; Future  -     Iron Saturation %; Future  -     PTH, intact; Future  -     Vitamin A; Future  -     Vitamin B1, whole blood; Future  -     Vitamin B12; Future  -     Vitamin D 25 hydroxy; Future  -     Zinc; Future    Postsurgical malabsorption  -     CBC and Platelet; Future  -     Copper Level; Future  -     Ferritin; Future  -     Folate;  Future  -     Iron Saturation %; Future  -     PTH, intact; Future  -     Vitamin A; Future  -     Vitamin B1, whole blood; Future  -     Vitamin B12; Future  -     Vitamin D 25 hydroxy; Future  -     Zinc; Future    Type 2 diabetes mellitus with complication (HCC)  -     CBC and Platelet; Future  -     Copper Level; Future  -     Ferritin; Future  -     Folate; Future  -     Iron Saturation %; Future  -     PTH, intact; Future  -     Vitamin A; Future  -     Vitamin B1, whole blood; Future  -     Vitamin B12; Future  -     Vitamin D 25 hydroxy; Future  -     Zinc; Future    Obstructive sleep apnea treated with continuous positive airway pressure (CPAP)    Essential hypertension  -     CBC and Platelet; Future  -     Copper Level; Future  -     Ferritin; Future  -     Folate; Future  -     Iron Saturation %; Future  -     PTH, intact; Future  -     Vitamin A; Future  -     Vitamin B1, whole blood; Future  -     Vitamin B12; Future  -     Vitamin D 25 hydroxy; Future  -     Zinc; Future    Mixed hyperlipidemia    Heart burn  -     omeprazole (PriLOSEC) 20 mg delayed release capsule; Take 1 capsule (20 mg total) by mouth 2 (two) times a day    Obstructive sleep apnea syndrome    Other orders  -     hydrochlorothiazide (HYDRODIURIL) 25 mg tablet  -     EDARBI 80 MG tablet          Subjective:      Patient ID: Gwendolyn Peterson is a 48 y o  female  She is here in routine follow-up  Overall she is tolerating a regular diet  She does complain of heart burn if she misses her ppi and some symptoms when she lays down at night  No symptoms are worsened with activity  She is exercising regularly and happy with weight loss so far   She is taking bariatric supplements      The following portions of the patient's history were reviewed and updated as appropriate: allergies, current medications, past family history, past medical history, past social history, past surgical history and problem list     Review of Systems   Constitutional: Negative for chills and fever  Unexpected weight change: planned weight loss  Respiratory: Negative for shortness of breath and wheezing  Cardiovascular: Negative for chest pain and palpitations  Gastrointestinal: Negative for abdominal pain, constipation, diarrhea, nausea and vomiting  See hpi/discussion/heart burn   Psychiatric/Behavioral: Suicidal ideas: no complait of anxiety or depression  Objective:      /78 (BP Location: Right arm, Patient Position: Sitting)   Pulse 63   Temp 98 1 °F (36 7 °C) (Tympanic)   Ht 5' 4" (1 626 m)   Wt 95 5 kg (210 lb 8 oz)   BMI 36 13 kg/m²          Physical Exam   Constitutional: She is oriented to person, place, and time  She appears well-developed and well-nourished  obese   HENT:   Mouth/Throat: Oropharynx is clear and moist    Eyes: Conjunctivae are normal  No scleral icterus  Cardiovascular: Normal rate and regular rhythm  Pulmonary/Chest: Effort normal and breath sounds normal    Abdominal: Soft  There is no tenderness  No incisional hernias appreciated; obese abdomen   Musculoskeletal:   Antalgic gait   Neurological: She is alert and oriented to person, place, and time  Psychiatric: She has a normal mood and affect  Her behavior is normal  Judgment and thought content normal    Nursing note and vitals reviewed  GOALS: Continued weight loss with good nutrition intakes    Normal vitamin and mineral levels  Exercise as tolerated    BARRIERS: none identified

## 2020-02-26 ENCOUNTER — OFFICE VISIT (OUTPATIENT)
Dept: URGENT CARE | Facility: CLINIC | Age: 55
End: 2020-02-26
Payer: COMMERCIAL

## 2020-02-26 VITALS
HEART RATE: 69 BPM | BODY MASS INDEX: 35.3 KG/M2 | TEMPERATURE: 97.8 F | HEIGHT: 64 IN | SYSTOLIC BLOOD PRESSURE: 142 MMHG | DIASTOLIC BLOOD PRESSURE: 90 MMHG | OXYGEN SATURATION: 100 % | WEIGHT: 206.8 LBS | RESPIRATION RATE: 18 BRPM

## 2020-02-26 DIAGNOSIS — S39.012A STRAIN OF LUMBAR PARASPINAL MUSCLE, INITIAL ENCOUNTER: Primary | ICD-10-CM

## 2020-02-26 PROCEDURE — 99213 OFFICE O/P EST LOW 20 MIN: CPT | Performed by: NURSE PRACTITIONER

## 2020-02-26 RX ORDER — METAXALONE 800 MG/1
800 TABLET ORAL 3 TIMES DAILY
Qty: 15 TABLET | Refills: 0 | Status: SHIPPED | OUTPATIENT
Start: 2020-02-26 | End: 2020-08-16 | Stop reason: SDUPTHER

## 2020-02-26 NOTE — PATIENT INSTRUCTIONS
Rest   Alternate ice and warm compresses as discussed  Start muscle relaxer for muscle spasms, avoid driving while taking medication  Muscle relaxer as prescribed      Follow up with PCP or Ortho for further evaluation if pain persist   Referral placed from comprehensive spine program   Go to ER with worsening symptoms  Acute Low Back Pain, Ambulatory Care   GENERAL INFORMATION:   Acute low back pain  is discomfort in your lower back area that lasts for less than 12 weeks  The word acute is used to describe pain that starts suddenly, worsens quickly, and lasts for a short time  Common symptoms include the following:   · Back stiffness or spasms    · Pain down the back or side of one leg    · Holding yourself in an unusual position or posture to decrease your back pain    · Not being able to find a sitting position that is comfortable    · Slow increase in your pain for 24 to 48 hours after you stress your back    · Tenderness on your lower back or severe pain when you move your back  Seek immediate care for the following symptoms:   · Severe pain    · Sudden stiffness and heaviness in both buttocks down to both legs    · Numbness or weakness in one leg, or pain in both legs    · Numbness in your genital area or across your lower back    · Unable to control your urine or bowel movements  Treatment for acute low back pain  may include any of the following:  · Medicines:      ¨ NSAIDs  help decrease swelling and pain or fever  This medicine is available with or without a doctor's order  NSAIDs can cause stomach bleeding or kidney problems in certain people  If you take blood thinner medicine, always ask your healthcare provider if NSAIDs are safe for you  Always read the medicine label and follow directions  ¨ Muscle relaxers  help decrease muscle spasms pain  ¨ Prescription pain medicine  may be given  Ask how to take this medicine safely      · Surgery  may be needed if your pain is severe and other treatments do not work  Surgery may be needed for conditions of the lumbar spine, such as herniated disc or spinal stenosis  Manage your symptoms:   · Sleep on a firm mattress  If you do not have a firm mattress, have someone move your mattress to the floor for a few days  A piece of plywood under your mattress can also help make it firmer  · Apply ice  on your lower back for 15 to 20 minutes every hour or as directed  Use an ice pack, or put crushed ice in a plastic bag  Cover it with a towel  Ice helps prevent tissue damage and decreases swelling and pain  You can alternate ice and heat  · Apply heat  on your lower back for 20 to 30 minutes every 2 hours for as many days as directed  Heat helps decrease pain and muscle spasms  · Go to physical therapy  A physical therapist teaches you exercises to help improve movement and strength, and to decrease pain  Prevent acute low back pain:   · Use proper body mechanics  ¨ Bend at the hips and knees when you  objects  Do not bend from the waist  Use your leg muscles as you lift the load  Do not use your back  Keep the object close to your chest as you lift it  Try not to twist or lift anything above your waist     ¨ Change your position often when you stand for long periods of time  Rest one foot on a small box or footrest, and then switch to the other foot often  ¨ Try not to sit for long periods of time  When you do, sit in a straight-backed chair with your feet flat on the floor  Never reach, pull, or push while you are sitting  · Exercise regularly  Warm up before you exercise  Do exercises that strengthen your back muscles  Ask about the best exercise plan for you  · Maintain a healthy weight  Ask your healthcare provider how much you should weigh  Ask him to help you create a weight loss plan if you are overweight    Follow up with your healthcare provider as directed:  Return for a follow-up visit if you still have pain after 1 to 3 weeks of treatment  You may need to visit an orthopedist if your back pain lasts more than 6 to 12 weeks  Write down your questions so you remember to ask them during your visits  CARE AGREEMENT:   You have the right to help plan your care  Learn about your health condition and how it may be treated  Discuss treatment options with your caregivers to decide what care you want to receive  You always have the right to refuse treatment  The above information is an  only  It is not intended as medical advice for individual conditions or treatments  Talk to your doctor, nurse or pharmacist before following any medical regimen to see if it is safe and effective for you  © 2014 [address] is for End User's use only and may not be sold, redistributed or otherwise used for commercial purposes  All illustrations and images included in CareNotes® are the copyrighted property of A D [employer]  or Karlo Woodward

## 2020-02-26 NOTE — LETTER
February 26, 2020     Patient: Priya Baron   YOB: 1965   Date of Visit: 2/26/2020       To Whom it May Concern:    Priya Baron was seen in my clinic on 2/26/2020  She may return to work on 02/28/2020  If you have any questions or concerns, please don't hesitate to call           Sincerely,          JOSE ALEJANDRO Higgins        CC: Priya Loraine

## 2020-02-26 NOTE — PROGRESS NOTES
[address] [facility]        NAME: Jenna June is a 48 y o  female  : 1965    MRN: [id number]  DATE: 2020  TIME: 10:55 AM    Assessment and Plan   Strain of lumbar paraspinal muscle, initial encounter [S39 012A]  1  Strain of lumbar paraspinal muscle, initial encounter  metaxalone (SKELAXIN) 800 mg tablet    Ambulatory Referral to Comprehensive Spine Program         Patient Instructions     Patient Instructions   Rest   Alternate ice and warm compresses as discussed  Start muscle relaxer for muscle spasms, avoid driving while taking medication  Muscle relaxer as prescribed      Follow up with PCP or Ortho for further evaluation if pain persist   Referral placed from comprehensive spine program   Go to ER with worsening symptoms  Chief Complaint     Chief Complaint   Patient presents with    Back Pain     pt states that she has been having back spasms since monday after wrestling with her dog  History of Present Illness   Jenna June presents to the clinic c/o    This is a 53-year-old female here today with complaints mid to low back pain  She states she has had this for several weeks  She states her last or weeks when she was getting out of bed in the morning she had some tightness in her back  Symptoms improved throughout the day  She states on Monday her dog jumped on her face while she was sleeping  She wears a CPAP  She was trying to move the dog off of her and thinks she may have worsened her back pain  She states she is having spasms  She denies any numbness tingling down her legs  No loss of bowel or bladder  Pain is worse with movements  She has been using he which is not helping  She has been going to massage therapist which has helped but she states her massage therapist now has the flu  She has been on Skelaxin in the past       Review of Systems   Review of Systems   Constitutional: Negative for activity change, chills, fatigue and fever  Gastrointestinal: Negative  Genitourinary: Negative  Musculoskeletal: Positive for back pain  Neurological: Negative for weakness and numbness  Psychiatric/Behavioral: Negative  Current Medications     Long-Term Medications   Medication Sig Dispense Refill    ALPRAZolam (XANAX) 0 25 mg tablet Take 0 25 mg by mouth daily at bedtime as needed       EDARBI 80 MG tablet       hydrochlorothiazide (HYDRODIURIL) 25 mg tablet       lamoTRIgine (LaMICtal) 200 MG tablet Take 200 mg by mouth daily       omeprazole (PriLOSEC) 20 mg delayed release capsule Take 1 capsule (20 mg total) by mouth 2 (two) times a day 180 capsule 2    venlafaxine (EFFEXOR-XR) 75 mg 24 hr capsule Take 75 mg by mouth daily       metaxalone (SKELAXIN) 800 mg tablet Take 1 tablet (800 mg total) by mouth 3 (three) times a day for 5 days 15 tablet 0       Current Allergies     Allergies as of 02/26/2020 - Reviewed 02/26/2020   Allergen Reaction Noted    Amoxicillin Hives 09/30/2009    Amoxicillin-pot clavulanate Hives     Oxycodone-acetaminophen Vomiting     Sulfa antibiotics Hives     Erythromycin GI Intolerance 09/30/2009    Wound dressings Rash             The following portions of the patient's history were reviewed and updated as appropriate: allergies, current medications, past family history, past medical history, past social history, past surgical history and problem list     Objective   /90 Comment: manual  Pulse 69   Temp 97 8 °F (36 6 °C) (Tympanic)   Resp 18   Ht 5' 4" (1 626 m)   Wt 93 8 kg (206 lb 12 8 oz)   SpO2 100%   BMI 35 50 kg/m²        Physical Exam     Physical Exam   Constitutional: She is oriented to person, place, and time  She appears well-developed and well-nourished  Cardiovascular: Normal rate and regular rhythm  Pulmonary/Chest: Effort normal and breath sounds normal    Musculoskeletal:   Lumbar spine:  Tenderness to palpate over the left upper paralumbar region    There is spasm noted  Decreased range of motion with pain  Normal strength  Normal deep tendon reflexes  Neurological: She is alert and oriented to person, place, and time  Psychiatric: She has a normal mood and affect  Her behavior is normal    Nursing note and vitals reviewed

## 2020-02-27 ENCOUNTER — TELEPHONE (OUTPATIENT)
Dept: PHYSICAL THERAPY | Facility: OTHER | Age: 55
End: 2020-02-27

## 2020-02-27 NOTE — TELEPHONE ENCOUNTER
Voice mail/message left for patient to return call to [facility] program including our hours of business and phone number  First attempt to contact patient regarding referral  Deferred per protocol for f/u

## 2020-03-11 ENCOUNTER — TELEPHONE (OUTPATIENT)
Dept: PHYSICAL THERAPY | Facility: OTHER | Age: 55
End: 2020-03-11

## 2020-03-11 NOTE — TELEPHONE ENCOUNTER
Nurse spoke with patient regarding referral placed in EMR for [facility] last month  Patient states her pain has resolved and was very appreciative of nurses call  Patient stated she received the message left from our program, but she then left the state for awhile  Nurse pleased her issue has resolved and wished her well  Reminded she will not need a new referral if our services are needed in the future  Patient again thanked nurse for reaching out/following up      Referral closed

## 2020-05-05 ENCOUNTER — TELEMEDICINE (OUTPATIENT)
Dept: BARIATRICS | Facility: CLINIC | Age: 55
End: 2020-05-05
Payer: COMMERCIAL

## 2020-05-05 VITALS — HEIGHT: 64 IN | BODY MASS INDEX: 31.58 KG/M2 | WEIGHT: 185 LBS

## 2020-05-05 DIAGNOSIS — K91.2 POSTSURGICAL MALABSORPTION: Chronic | ICD-10-CM

## 2020-05-05 DIAGNOSIS — Z48.815 ENCOUNTER FOR SURGICAL AFTERCARE FOLLOWING SURGERY OF DIGESTIVE SYSTEM: ICD-10-CM

## 2020-05-05 DIAGNOSIS — E11.9 DIABETES MELLITUS (HCC): ICD-10-CM

## 2020-05-05 DIAGNOSIS — I10 ESSENTIAL HYPERTENSION: ICD-10-CM

## 2020-05-05 DIAGNOSIS — E66.9 OBESITY, CLASS I, BMI 30-34.9: Primary | ICD-10-CM

## 2020-05-05 DIAGNOSIS — K21.9 GERD (GASTROESOPHAGEAL REFLUX DISEASE): ICD-10-CM

## 2020-05-05 DIAGNOSIS — G47.33 OBSTRUCTIVE SLEEP APNEA SYNDROME: ICD-10-CM

## 2020-05-05 PROBLEM — E66.01 MORBID OBESITY (HCC): Status: RESOLVED | Noted: 2019-09-27 | Resolved: 2020-05-05

## 2020-05-05 PROBLEM — E66.812 OBESITY, CLASS II, BMI 35-39.9: Status: RESOLVED | Noted: 2020-01-31 | Resolved: 2020-05-05

## 2020-05-05 PROBLEM — E66.811 OBESITY, CLASS I, BMI 30-34.9: Status: ACTIVE | Noted: 2020-05-05

## 2020-05-05 PROCEDURE — 99214 OFFICE O/P EST MOD 30 MIN: CPT | Performed by: PHYSICIAN ASSISTANT

## 2020-08-16 ENCOUNTER — OFFICE VISIT (OUTPATIENT)
Dept: URGENT CARE | Age: 55
End: 2020-08-16
Payer: COMMERCIAL

## 2020-08-16 VITALS
RESPIRATION RATE: 16 BRPM | OXYGEN SATURATION: 100 % | SYSTOLIC BLOOD PRESSURE: 149 MMHG | TEMPERATURE: 97.2 F | HEART RATE: 90 BPM | DIASTOLIC BLOOD PRESSURE: 78 MMHG

## 2020-08-16 DIAGNOSIS — S39.012A STRAIN OF LUMBAR PARASPINAL MUSCLE, INITIAL ENCOUNTER: Primary | ICD-10-CM

## 2020-08-16 PROCEDURE — 99213 OFFICE O/P EST LOW 20 MIN: CPT | Performed by: PHYSICIAN ASSISTANT

## 2020-08-16 RX ORDER — AMLODIPINE BESYLATE 10 MG/1
10 TABLET ORAL DAILY
COMMUNITY
Start: 2020-07-23

## 2020-08-16 RX ORDER — METAXALONE 800 MG/1
800 TABLET ORAL 3 TIMES DAILY PRN
Qty: 21 TABLET | Refills: 0 | Status: SHIPPED | OUTPATIENT
Start: 2020-08-16 | End: 2021-01-18

## 2020-08-16 RX ORDER — VENLAFAXINE HYDROCHLORIDE 37.5 MG/1
CAPSULE, EXTENDED RELEASE ORAL
COMMUNITY
Start: 2020-06-04

## 2020-08-16 RX ORDER — CHLORTHALIDONE 25 MG/1
TABLET ORAL
COMMUNITY
Start: 2020-08-08

## 2020-08-16 RX ORDER — LOSARTAN POTASSIUM 100 MG/1
TABLET ORAL
COMMUNITY
Start: 2020-08-08

## 2020-08-16 RX ORDER — BISOPROLOL FUMARATE 10 MG/1
10 TABLET ORAL 2 TIMES DAILY
COMMUNITY
Start: 2020-07-23

## 2020-08-16 NOTE — PROGRESS NOTES
[facility]        NAME: Garcia Issa is a 48 y o  female  : 1965    MRN: [id number]  DATE: 2020  TIME: 4:23 PM    Assessment and Plan   Strain of lumbar paraspinal muscle, initial encounter [S39 012A]  1  Strain of lumbar paraspinal muscle, initial encounter  metaxalone (SKELAXIN) 800 mg tablet         Patient Instructions       Continue to monitor symptoms  If new or worsening symptoms develop, go immediately to Er  Drink plenty of fluids  Follow up with Family Doctor this week  Chief Complaint     Chief Complaint   Patient presents with    Neck Pain     Patient c/o neck spasms, radiating to shoulders and anterior shoulders  States this happens intemittently  Skelaxin usually effective for pain  History of Present Illness       Neck Pain    This is a recurrent problem  The current episode started yesterday  The problem occurs constantly  The problem has been gradually worsening  The pain is associated with a sleep position  The pain is present in the left side  The quality of the pain is described as aching (Tightness)  The pain is moderate  The symptoms are aggravated by twisting and bending  The pain is same all the time  Pertinent negatives include no chest pain, fever, headaches, numbness, pain with swallowing, photophobia, tingling, trouble swallowing, visual change or weakness  She has tried nothing for the symptoms  The treatment provided no relief  Patient has previously had Skelaxin for this issue  She states that this always works, however, she is out  This feels identical to prior episodes    Review of Systems   Review of Systems   Constitutional: Negative  Negative for chills, fatigue and fever  HENT: Negative  Negative for trouble swallowing  Eyes: Negative  Negative for photophobia  Respiratory: Negative  Negative for chest tightness, shortness of breath and wheezing  Cardiovascular: Negative    Negative for chest pain and palpitations  Gastrointestinal: Negative for abdominal pain, constipation, diarrhea, nausea and vomiting  Endocrine: Negative  Genitourinary: Negative for dysuria  Musculoskeletal: Positive for neck stiffness  Negative for back pain, gait problem and myalgias  Skin: Negative  Negative for pallor and rash  Allergic/Immunologic: Negative  Neurological: Negative  Negative for tingling, weakness, numbness and headaches  Hematological: Negative  Psychiatric/Behavioral: Negative            Current Medications       Current Outpatient Medications:     acetaminophen (TYLENOL) 325 mg tablet, Take 3 tablets (975 mg total) by mouth every 8 (eight) hours, Disp: 30 tablet, Rfl: 0    albuterol (PROVENTIL HFA,VENTOLIN HFA) 90 mcg/act inhaler, Inhale 2 puffs every 4 (four) hours as needed , Disp: , Rfl:     ALPRAZolam (XANAX) 0 25 mg tablet, Take 0 25 mg by mouth daily at bedtime as needed , Disp: , Rfl:     amLODIPine (NORVASC) 10 mg tablet, Take 10 mg by mouth daily, Disp: , Rfl:     bisoprolol (ZEBETA) 10 MG tablet, Take 10 mg by mouth 2 (two) times a day, Disp: , Rfl:     chlorthalidone 25 mg tablet, , Disp: , Rfl:     diclofenac sodium (Voltaren) 1 %, Apply 2 g topically 3 (three) times a day, Disp: , Rfl:     glucose blood test strip, Check blood sugar QID, Disp: , Rfl:     lamoTRIgine (LaMICtal) 200 MG tablet, Take 200 mg by mouth daily , Disp: , Rfl:     losartan (COZAAR) 100 MG tablet, , Disp: , Rfl:     omeprazole (PriLOSEC) 20 mg delayed release capsule, Take 1 capsule (20 mg total) by mouth 2 (two) times a day, Disp: 180 capsule, Rfl: 2    Sodium Hyaluronate (EUFLEXXA) 20 MG/2ML SOSY, knee injection every 6 months, Disp: , Rfl:     venlafaxine (EFFEXOR-XR) 75 mg 24 hr capsule, Take 75 mg by mouth daily , Disp: , Rfl:     EDARBI 80 MG tablet, , Disp: , Rfl:     hydrochlorothiazide (HYDRODIURIL) 25 mg tablet, , Disp: , Rfl:     metaxalone (SKELAXIN) 800 mg tablet, Take 1 tablet (800 mg total) by mouth 3 (three) times a day as needed for muscle spasms, Disp: 21 tablet, Rfl: 0    TRAMADOL HCL PO, Take 50 mg by mouth every 6 (six) hours as needed , Disp: , Rfl:     venlafaxine (EFFEXOR-XR) 37 5 mg 24 hr capsule, , Disp: , Rfl:     Current Allergies     Allergies as of 2020 - Reviewed 2020   Allergen Reaction Noted    Amoxicillin Hives 2009    Amoxicillin-pot clavulanate Hives     Oxycodone-acetaminophen Vomiting     Sulfa antibiotics Hives     Erythromycin GI Intolerance 2009    Wound dressings Rash             The following portions of the patient's history were reviewed and updated as appropriate: allergies, current medications, past family history, past medical history, past social history, past surgical history and problem list      Past Medical History:   Diagnosis Date    Acute bronchitis     Anxiety     Arthritis     Asthma     Bariatric surgery status     Continuous positive airway pressure dependence     CPAP (continuous positive airway pressure) dependence     Depression     Diabetes mellitus     GERD (gastroesophageal reflux disease)     Hyperlipidemia     Hypertension     Morbid obesity     Postsurgical malabsorption     Sleep apnea     USES C PAP       Past Surgical History:   Procedure Laterality Date    BREAST SURGERY      bilat lumpectomy     SECTION      twice    CHOLECYSTECTOMY      EGD      HYSTERECTOMY      KNEE ARTHROSCOPY Bilateral     OR LAP, CLARE RESTRICT PROC, LONGITUDINAL GASTRECTOMY N/A 10/21/2019    Procedure: LAPAROSCOPIC SLEEVE GASTRECTOMY AND INTRAOPERATIVE EGD;  Surgeon: Stanton Wesley MD;  Location: [facility];  Service: Bariatrics    WISDOM TOOTH EXTRACTION         Family History   Problem Relation Age of Onset   [facility] Breast cancer Mother     Hypertension Mother    [facility] Brain cancer Mother     Prostate cancer Father     Melanoma Father     Diabetes Father     Hypertension Father     Heart disease Father     Skin cancer Father     Thyroid disease Neg Hx     Stroke Neg Hx          Medications have been verified  Objective   /78   Pulse 90   Temp (!) 97 2 °F (36 2 °C) (Tympanic)   Resp 16   SpO2 100%        Physical Exam     Physical Exam  Vitals signs and nursing note reviewed  Constitutional:       General: She is not in acute distress  Appearance: She is well-developed  She is not diaphoretic  HENT:      Head: Normocephalic and atraumatic  Right Ear: External ear normal       Left Ear: External ear normal    Eyes:      General:         Right eye: No discharge  Left eye: No discharge  Conjunctiva/sclera: Conjunctivae normal    Neck:      Musculoskeletal: Normal range of motion and neck supple  No muscular tenderness  Cardiovascular:      Rate and Rhythm: Normal rate and regular rhythm  Heart sounds: Normal heart sounds  Pulmonary:      Effort: Pulmonary effort is normal  No respiratory distress  Breath sounds: Normal breath sounds  No wheezing or rales  Musculoskeletal:      Comments: Full range of motion of cervical spine in flexion, extension, twisting, lateral bending  Patient has pain with contralateral movements away from left side, however, she is able to fully move  No midline point tenderness  Patient has diffuse tenderness to left paraspinal muscles into left trapezius area  Lymphadenopathy:      Cervical: No cervical adenopathy  Skin:     General: Skin is warm  Findings: No rash

## 2020-08-16 NOTE — PATIENT INSTRUCTIONS
Continue to monitor symptoms  If new or worsening symptoms develop, go immediately to Er  Drink plenty of fluids  Follow up with Family Doctor this week  Cervical Strain   WHAT YOU NEED TO KNOW:   A cervical strain is a stretched or torn muscle or tendon in your neck  Tendons are strong tissues that connect muscles to bones  Common causes of cervical strains include a car accident, a fall, or a sports injury  DISCHARGE INSTRUCTIONS:   Return to the emergency department if:   · You have pain or numbness from your shoulder down to your hand  · You have problems with your vision, hearing, or balance  · You feel confused or cannot concentrate  · You have problems with movement and strength  Contact your healthcare provider if:   · You have increased swelling or pain in your neck  · You have questions or concerns about your condition or care  Medicines: You may need any of the following:  · Acetaminophen  decreases pain and fever  It is available without a doctor's order  Ask how much to take and how often to take it  Follow directions  Read the labels of all other medicines you are using to see if they also contain acetaminophen, or ask your doctor or pharmacist  Acetaminophen can cause liver damage if not taken correctly  Do not use more than 4 grams (4,000 milligrams) total of acetaminophen in one day  · NSAIDs , such as ibuprofen, help decrease swelling, pain, and fever  This medicine is available with or without a doctor's order  NSAIDs can cause stomach bleeding or kidney problems in certain people  If you take blood thinner medicine, always ask your healthcare provider if NSAIDs are safe for you  Always read the medicine label and follow directions  · Muscle relaxers  help decrease pain and muscle spasms  · Prescription pain medicine  may be given  Ask your healthcare provider how to take this medicine safely  Some prescription pain medicines contain acetaminophen   Do not take other medicines that contain acetaminophen without talking to your healthcare provider  Too much acetaminophen may cause liver damage  Prescription pain medicine may cause constipation  Ask your healthcare provider how to prevent or treat constipation  · Take your medicine as directed  Contact your healthcare provider if you think your medicine is not helping or if you have side effects  Tell him or her if you are allergic to any medicine  Keep a list of the medicines, vitamins, and herbs you take  Include the amounts, and when and why you take them  Bring the list or the pill bottles to follow-up visits  Carry your medicine list with you in case of an emergency  Manage your symptoms:   · Apply heat  on your neck for 15 to 20 minutes, 4 to 6 times a day or as directed  Heat helps decrease pain, stiffness, and muscle spasms  · Begin gentle neck exercises  as soon as you can move your neck without pain  Exercises will help decrease stiffness and improve the strength and movement of your neck  Ask your healthcare provider what kind of exercises you should do  · Gradually return to your usual activities as directed  Stop if you have pain  Avoid activities that can cause more damage to your neck, such as heavy lifting or strenuous exercise  · Sleep without a pillow  to help decrease pain  Instead, roll a small towel tightly and place it under your neck  · Go to physical therapy as directed  A physical therapist teaches you exercises to help improve movement and strength, and to decrease pain  Prevent neck injury:   · Drive safely  Make sure everyone in your car wears a seatbelt  A seatbelt can save your life if you are in an accident  Do not use your cell phone when you are driving  This could distract you and cause an accident  Pull over if you need to make a call or send a text message  · Wear helmets, lifejackets, and protective gear    Always wear a helmet when you ride a bike or motorcycle, go skiing, or play sports that could cause a head injury  Wear protective equipment when you play sports  Wear a lifejacket when you are on a boat or doing water sports  Follow up with your healthcare provider as directed: You may be referred to an orthopedist or physical therapies  Write down your questions so you remember to ask them during your visits  © 2017 [address] Information is for End User's use only and may not be sold, redistributed or otherwise used for commercial purposes  All illustrations and images included in CareNotes® are the copyrighted property of [employer]  or Karlo Woodward  The above information is an  only  It is not intended as medical advice for individual conditions or treatments  Talk to your doctor, nurse or pharmacist before following any medical regimen to see if it is safe and effective for you

## 2020-09-17 ENCOUNTER — OFFICE VISIT (OUTPATIENT)
Dept: SLEEP CENTER | Facility: CLINIC | Age: 55
End: 2020-09-17
Payer: COMMERCIAL

## 2020-09-17 VITALS
SYSTOLIC BLOOD PRESSURE: 132 MMHG | WEIGHT: 185 LBS | BODY MASS INDEX: 31.58 KG/M2 | HEIGHT: 64 IN | DIASTOLIC BLOOD PRESSURE: 70 MMHG

## 2020-09-17 DIAGNOSIS — Z99.89 OBSTRUCTIVE SLEEP APNEA TREATED WITH CONTINUOUS POSITIVE AIRWAY PRESSURE (CPAP): Primary | ICD-10-CM

## 2020-09-17 DIAGNOSIS — G47.33 OBSTRUCTIVE SLEEP APNEA TREATED WITH CONTINUOUS POSITIVE AIRWAY PRESSURE (CPAP): Primary | ICD-10-CM

## 2020-09-17 DIAGNOSIS — E66.9 OBESITY, CLASS I, BMI 30-34.9: ICD-10-CM

## 2020-09-17 PROBLEM — E66.813 OBESITY, CLASS III, BMI 40-49.9 (MORBID OBESITY): Status: RESOLVED | Noted: 2018-08-30 | Resolved: 2020-09-17

## 2020-09-17 PROBLEM — E66.01 OBESITY, CLASS III, BMI 40-49.9 (MORBID OBESITY) (HCC): Status: RESOLVED | Noted: 2018-08-30 | Resolved: 2020-09-17

## 2020-09-17 PROCEDURE — 99214 OFFICE O/P EST MOD 30 MIN: CPT | Performed by: NURSE PRACTITIONER

## 2020-09-17 NOTE — PATIENT INSTRUCTIONS
1   Continue use of CPAP equipment nightly - call if AHI is consistently higher than 5  2  Continue to clean your equipment, as discussed  3  Contact the Sleep [address] [place] [address] with any questions or concerns prior to your next visit, as needed  4  Schedule visit for follow-up in 1 year      Nursing Support:  When: Monday through Friday 7A-5PM except holidays  Where: Our direct line is [phone]  If you are having a true emergency please call 911  In the event that the line is busy or it is after hours please leave a voice message and we will return your call  Please speak clearly, leaving your full name, birth date, best number to reach you and the reason for your call  Medication refills: We will need the name of the medication, the dosage, the ordering provider, whether you get a 30 or 90 day refill, and the pharmacy name and address  Medications will be ordered by the provider only  Nurses cannot call in prescriptions  Please allow 7 days for medication refills  Physician requested updates: If your provider requested that you call with an update after starting medication, please be ready to provide us the medication and dosage, what time you take your medication, the time you attempt to fall asleep, time you fall asleep, when you wake up, and what time you get out of bed  Sleep Study Results: We will contact you with sleep study results and/or next steps after the physician has reviewed your testing

## 2020-09-17 NOTE — PROGRESS NOTES
Progress Note - [facility] 48 y o  female   :1965, MRN: [id number]  2020          Follow Up Evaluation / Problem:  Severe Obstructive Sleep Apnea  S/P gastric sleeve surgery  Obesity      Thank you for the opportunity of participating in the evaluation and care of this patient in the Sleep Clinic at [facility]  HPI: Ivis Sanabria is a 48y o  year old female  The patient presents for follow up of severe obstructive sleep apnea  She completed an overnight diagnostic polysomnogram in 2018, which identified severe obstructive sleep apnea  She had symptoms of excessive daytime sleepiness, frequent night time awakenings  and loud snoring  She began the use of AutoPAP in 2018  She initially had some difficulty tolerating a low pressure of 5cm  Pressure was increased to 7cm to 14cm  She then began to have difficulty with removing the mask and turning off the machine in her sleep  Pressure range was changed to 7-12cm  She is here to review annual compliance and effectiveness of treatment      Review of Systems      Genitourinary none   Cardiology none   Gastrointestinal none   Neurology numbness/tingling of an extremity, forgetfulness, poor concentration or confusion,  and difficulty with memory   Constitutional none   Integumentary none   Psychiatry none   Musculoskeletal none   Pulmonary none   ENT ringing in ears   Endocrine none   Hematological none         Current Outpatient Medications:     albuterol (PROVENTIL HFA,VENTOLIN HFA) 90 mcg/act inhaler, Inhale 2 puffs every 4 (four) hours as needed , Disp: , Rfl:     ALPRAZolam (XANAX) 0 25 mg tablet, Take 0 25 mg by mouth daily at bedtime as needed , Disp: , Rfl:     amLODIPine (NORVASC) 10 mg tablet, Take 10 mg by mouth daily, Disp: , Rfl:     bisoprolol (ZEBETA) 10 MG tablet, Take 10 mg by mouth 2 (two) times a day, Disp: , Rfl:     chlorthalidone 25 mg tablet, , Disp: , Rfl:     diclofenac sodium (Voltaren) 1 %, Apply 2 g topically 3 (three) times a day, Disp: , Rfl:     lamoTRIgine (LaMICtal) 200 MG tablet, Take 200 mg by mouth daily , Disp: , Rfl:     losartan (COZAAR) 100 MG tablet, , Disp: , Rfl:     metaxalone (SKELAXIN) 800 mg tablet, Take 1 tablet (800 mg total) by mouth 3 (three) times a day as needed for muscle spasms, Disp: 21 tablet, Rfl: 0    omeprazole (PriLOSEC) 20 mg delayed release capsule, Take 1 capsule (20 mg total) by mouth 2 (two) times a day, Disp: 180 capsule, Rfl: 2    Sodium Hyaluronate (EUFLEXXA) 20 MG/2ML SOSY, knee injection every 6 months, Disp: , Rfl:     venlafaxine (EFFEXOR-XR) 37 5 mg 24 hr capsule, , Disp: , Rfl:     venlafaxine (EFFEXOR-XR) 75 mg 24 hr capsule, Take 75 mg by mouth daily , Disp: , Rfl:     acetaminophen (TYLENOL) 325 mg tablet, Take 3 tablets (975 mg total) by mouth every 8 (eight) hours (Patient not taking: Reported on 9/17/2020), Disp: 30 tablet, Rfl: 0    EDARBI 80 MG tablet, , Disp: , Rfl:     glucose blood test strip, Check blood sugar QID, Disp: , Rfl:     hydrochlorothiazide (HYDRODIURIL) 25 mg tablet, , Disp: , Rfl:     TRAMADOL HCL PO, Take 50 mg by mouth every 6 (six) hours as needed , Disp: , Rfl:     [place] Sleepiness Scale  Sitting and reading: Slight chance of dozing  Watching TV: Slight chance of dozing  Sitting, inactive in a public place (e g  a theatre or a meeting):  Would never doze  As a passenger in a car for an hour without a break: Slight chance of dozing  Lying down to rest in the afternoon when circumstances permit: High chance of dozing  Sitting and talking to someone: Would never doze  Sitting quietly after a lunch without alcohol: Would never doze  In a car, while stopped for a few minutes in traffic: Slight chance of dozing  Total score: 7              Vitals:    09/17/20 0900   BP: 132/70   Weight: 83 9 kg (185 lb)   Height: 5' 4" (1 626 m) Body mass index is 31 76 kg/m²  Neck Circumference: 16       EPWORTH SLEEPINESS SCORE  Total score: 7      Past History Since Last Sleep Center Visit:   She has lost 120 lbs since having bariatric gastric sleeve surgery in October 2019  She reports that shortly after surgery, she stopped using the CPAP equipment due to symptoms of GERD she was struggling with immediately after surgery  She recently restarted use of the equipment  Since restarting PAP therapy, she has been sleeping much more soundly and feeling much more rested  She reports that she is sleeping very well with her current nasal pillow mask with equipment set at a range of 7cm to 12cm  At times, she notices that the pressure is so strong that it is causing her to open her mouth during sleep and wake her up  The patient reports that she cleans the equipment appropriately with mild soap and water  She has not changed supplies recently because she has not been using it  The review of systems and following portions of the patient's history were reviewed and updated as appropriate: allergies, current medications, past family history, past medical history, past social history, past surgical history, and problem list         OBJECTIVE    PAP Pressure: Nasal pillows - APAP with a lower limit of 7cm and upper limit of 12cm  Pressure changed to 5-10cm  DME Provider:  [facility]    Physical Exam:     General Appearance:   Alert, cooperative, no distress, appears stated age, obese     Head:   Normocephalic, without obvious abnormality, atraumatic     Eyes:   PERRL, conjunctiva/corneas clear, EOM's intact          Nose:  Nares normal, septum midline, no drainage or sinus tenderness           Throat:  Lips, teeth and gums normal; tongue normal size and  shape and midline, mucosa moist with redundancy and low-lying soft palatal tissue, uvula barely visualized, tonsils not visualized, Mallampati score 4      Neck:  Supple, symmetrical, trachea midline, no adenopathy; Thyroid: No enlargement, tenderness or nodules; no carotid bruit or JVD     Lungs:      Clear to auscultation bilaterally, respirations unlabored     Heart:   Regular rate and rhythm, S1 and S2 normal, no murmur, rub or gallop       Extremities:  Extremities normal, atraumatic, no cyanosis or edema       Skin:  Skin color, texture, turgor normal, no rashes or lesions       Neurologic:  No focal deficits noted       ASSESSMENT / PLAN    1  Obstructive sleep apnea treated with continuous positive airway pressure (CPAP)  PAP DME Pressure Change    PAP DME Resupply/Reorder   2  Obesity, Class I, BMI 30-34 9             Counseling / Coordination of Care  Total clinic time spent today 30 minutes  Greater than 50% of total time was spent with the patient and / or family counseling and / or coordination of care  A description of the counseling / coordination of care:     Impressions, Diagnostic results, Prognosis, Instructions for management, Risks and benefits of treatment, Patient and family education, Risk factor reductions and Importance of compliance with treatment    Today I reviewed the patient's compliance data  she has been able to use the equipment 80% of all days recorded  Average usage was 4 or more hours 66 7% of all days recorded  The estimated AHI is 10 0 abnormal breathing events per hour  A detailed report was requested due to elevated AHI - average indices  CA 2 5, OA 3 1, Hypopnea index 4 6  Pressure change has been ordered to decrease pressure to 5-10cm  It was recommended that she begin to use a chin strap when wearing her CPAP mask, which will help her to keep her mouth closed and reduce hypopneas and APAP increasing pressure  Response to treatment has been very good  She was encouraged to use her equipment more regularly  Supplies have been ordered for the next year     We discussed the use of the Dream  adal, which will allow the patient to view usage, mask fit and AHI, with a goal of less than 5  She will continue using this equipment at the settings noted above for the next 12 months  At that timeshe will then return for a routine follow-up evaluation  I have asked the patient to contact the [address] if she encounters any difficulties prior to that time  The following instructions have been given to the patient today:    Patient Instructions   1  Continue use of CPAP equipment nightly - call if AHI is consistently higher than 5  2  Continue to clean your equipment, as discussed  3  Contact the [address] with any questions or concerns prior to your next visit, as needed  4  Schedule visit for follow-up in 1 year      Nursing Support:  When: Monday through Friday 7A-5PM except holidays  Where: Our direct line is [phone]  If you are having a true emergency please call 911  In the event that the line is busy or it is after hours please leave a voice message and we will return your call  Please speak clearly, leaving your full name, birth date, best number to reach you and the reason for your call  Medication refills: We will need the name of the medication, the dosage, the ordering provider, whether you get a 30 or 90 day refill, and the pharmacy name and address  Medications will be ordered by the provider only  Nurses cannot call in prescriptions  Please allow 7 days for medication refills  Physician requested updates: If your provider requested that you call with an update after starting medication, please be ready to provide us the medication and dosage, what time you take your medication, the time you attempt to fall asleep, time you fall asleep, when you wake up, and what time you get out of bed  Sleep Study Results: We will contact you with sleep study results and/or next steps after the physician has reviewed your testing          JOSE ALEJANDRO Mcdonald  [facility]

## 2020-09-18 ENCOUNTER — TELEPHONE (OUTPATIENT)
Dept: SLEEP CENTER | Facility: CLINIC | Age: 55
End: 2020-09-18

## 2020-09-19 ENCOUNTER — APPOINTMENT (OUTPATIENT)
Dept: LAB | Facility: CLINIC | Age: 55
End: 2020-09-19
Payer: COMMERCIAL

## 2020-09-19 DIAGNOSIS — K91.2 POSTSURGICAL MALABSORPTION: Chronic | ICD-10-CM

## 2020-09-19 DIAGNOSIS — Z98.890 STATUS POST DIGESTIVE SYSTEM SURGERY: ICD-10-CM

## 2020-09-19 DIAGNOSIS — I10 ESSENTIAL HYPERTENSION: ICD-10-CM

## 2020-09-19 DIAGNOSIS — E11.9 DIABETES MELLITUS (HCC): ICD-10-CM

## 2020-09-19 DIAGNOSIS — E11.8 TYPE 2 DIABETES MELLITUS WITH COMPLICATION (HCC): ICD-10-CM

## 2020-09-19 DIAGNOSIS — K21.9 GERD (GASTROESOPHAGEAL REFLUX DISEASE): ICD-10-CM

## 2020-09-19 LAB
25(OH)D3 SERPL-MCNC: 50.8 NG/ML (ref 30–100)
ERYTHROCYTE [DISTWIDTH] IN BLOOD BY AUTOMATED COUNT: 13.7 % (ref 11.6–15.1)
EST. AVERAGE GLUCOSE BLD GHB EST-MCNC: 117 MG/DL
FERRITIN SERPL-MCNC: 52 NG/ML (ref 8–388)
FOLATE SERPL-MCNC: 7.5 NG/ML (ref 3.1–17.5)
HBA1C MFR BLD: 5.7 %
HCT VFR BLD AUTO: 44.2 % (ref 34.8–46.1)
HGB BLD-MCNC: 14.3 G/DL (ref 11.5–15.4)
IRON SATN MFR SERPL: 17 %
IRON SERPL-MCNC: 55 UG/DL (ref 50–170)
MCH RBC QN AUTO: 28.1 PG (ref 26.8–34.3)
MCHC RBC AUTO-ENTMCNC: 32.4 G/DL (ref 31.4–37.4)
MCV RBC AUTO: 87 FL (ref 82–98)
PLATELET # BLD AUTO: 304 THOUSANDS/UL (ref 149–390)
PMV BLD AUTO: 9.3 FL (ref 8.9–12.7)
PTH-INTACT SERPL-MCNC: 43.6 PG/ML (ref 18.4–80.1)
RBC # BLD AUTO: 5.09 MILLION/UL (ref 3.81–5.12)
TIBC SERPL-MCNC: 317 UG/DL (ref 250–450)
VIT B12 SERPL-MCNC: 971 PG/ML (ref 100–900)
WBC # BLD AUTO: 6.52 THOUSAND/UL (ref 4.31–10.16)

## 2020-09-19 PROCEDURE — 82746 ASSAY OF FOLIC ACID SERUM: CPT

## 2020-09-19 PROCEDURE — 83540 ASSAY OF IRON: CPT

## 2020-09-19 PROCEDURE — 83036 HEMOGLOBIN GLYCOSYLATED A1C: CPT

## 2020-09-19 PROCEDURE — 82525 ASSAY OF COPPER: CPT

## 2020-09-19 PROCEDURE — 36415 COLL VENOUS BLD VENIPUNCTURE: CPT

## 2020-09-19 PROCEDURE — 82306 VITAMIN D 25 HYDROXY: CPT

## 2020-09-19 PROCEDURE — 82728 ASSAY OF FERRITIN: CPT

## 2020-09-19 PROCEDURE — 84425 ASSAY OF VITAMIN B-1: CPT

## 2020-09-19 PROCEDURE — 85027 COMPLETE CBC AUTOMATED: CPT

## 2020-09-19 PROCEDURE — 84590 ASSAY OF VITAMIN A: CPT

## 2020-09-19 PROCEDURE — 83970 ASSAY OF PARATHORMONE: CPT

## 2020-09-19 PROCEDURE — 83550 IRON BINDING TEST: CPT

## 2020-09-19 PROCEDURE — 84630 ASSAY OF ZINC: CPT

## 2020-09-19 PROCEDURE — 82607 VITAMIN B-12: CPT

## 2020-09-23 LAB
VIT A SERPL-MCNC: 56.1 UG/DL (ref 20.1–62)
VIT B1 BLD-SCNC: 126.7 NMOL/L (ref 66.5–200)
ZINC SERPL-MCNC: 100 UG/DL (ref 56–134)

## 2020-09-24 LAB — COPPER SERPL-MCNC: 151 UG/DL (ref 72–166)

## 2020-11-05 ENCOUNTER — OFFICE VISIT (OUTPATIENT)
Dept: BARIATRICS | Facility: CLINIC | Age: 55
End: 2020-11-05
Payer: COMMERCIAL

## 2020-11-05 VITALS
DIASTOLIC BLOOD PRESSURE: 80 MMHG | WEIGHT: 191.5 LBS | BODY MASS INDEX: 32.69 KG/M2 | SYSTOLIC BLOOD PRESSURE: 140 MMHG | HEART RATE: 56 BPM | HEIGHT: 64 IN | TEMPERATURE: 98.7 F

## 2020-11-05 DIAGNOSIS — G47.33 OBSTRUCTIVE SLEEP APNEA TREATED WITH CONTINUOUS POSITIVE AIRWAY PRESSURE (CPAP): ICD-10-CM

## 2020-11-05 DIAGNOSIS — E11.9 DIABETES MELLITUS (HCC): ICD-10-CM

## 2020-11-05 DIAGNOSIS — K21.9 GASTROESOPHAGEAL REFLUX DISEASE, UNSPECIFIED WHETHER ESOPHAGITIS PRESENT: ICD-10-CM

## 2020-11-05 DIAGNOSIS — Z98.84 BARIATRIC SURGERY STATUS: ICD-10-CM

## 2020-11-05 DIAGNOSIS — Z99.89 OBSTRUCTIVE SLEEP APNEA TREATED WITH CONTINUOUS POSITIVE AIRWAY PRESSURE (CPAP): ICD-10-CM

## 2020-11-05 DIAGNOSIS — I10 ESSENTIAL HYPERTENSION: ICD-10-CM

## 2020-11-05 DIAGNOSIS — K91.2 POSTSURGICAL MALABSORPTION: Chronic | ICD-10-CM

## 2020-11-05 DIAGNOSIS — Z98.890 STATUS POST DIGESTIVE SYSTEM SURGERY: ICD-10-CM

## 2020-11-05 DIAGNOSIS — Z48.815 ENCOUNTER FOR SURGICAL AFTERCARE FOLLOWING SURGERY OF DIGESTIVE SYSTEM: ICD-10-CM

## 2020-11-05 DIAGNOSIS — E66.9 OBESITY, CLASS I, BMI 30-34.9: Primary | ICD-10-CM

## 2020-11-05 DIAGNOSIS — R12 HEART BURN: ICD-10-CM

## 2020-11-05 PROCEDURE — 99214 OFFICE O/P EST MOD 30 MIN: CPT | Performed by: PHYSICIAN ASSISTANT

## 2020-11-05 RX ORDER — OMEPRAZOLE 20 MG/1
20 CAPSULE, DELAYED RELEASE ORAL 2 TIMES DAILY
Qty: 180 CAPSULE | Refills: 2 | Status: SHIPPED | OUTPATIENT
Start: 2020-11-05 | End: 2021-10-15 | Stop reason: SDUPTHER

## 2020-11-19 ENCOUNTER — OFFICE VISIT (OUTPATIENT)
Dept: BARIATRICS | Facility: CLINIC | Age: 55
End: 2020-11-19

## 2020-11-19 VITALS — BODY MASS INDEX: 33.02 KG/M2 | WEIGHT: 193.4 LBS | HEIGHT: 64 IN

## 2020-11-19 DIAGNOSIS — Z98.84 BARIATRIC SURGERY STATUS: Primary | ICD-10-CM

## 2020-11-19 PROCEDURE — RECHECK

## 2020-12-08 ENCOUNTER — TELEPHONE (OUTPATIENT)
Dept: SLEEP CENTER | Facility: CLINIC | Age: 55
End: 2020-12-08

## 2021-01-14 NOTE — PROGRESS NOTES
Bariatric Follow Up Nutrition Note    Type of surgery  Vertical sleeve gastrectomy  Surgery Date: 2019  15 months   post-op  Surgeon: Dr Syed Zelaya  50 y o   female     Ht 5' 4" (1 626 m)   Wt 89 5 kg (197 lb 4 8 oz)   BMI 33 87 kg/m²     Wt Readings from Last 3 Encounters:   20 87 7 kg (193 lb 6 4 oz)   20 86 9 kg (191 lb 8 oz)   20 83 9 kg (185 lb)       [place]  [place] Equation:  1622  Estimated calories for weight loss 950-1450 ( 1-2# per wk wt loss - sedentary )  Estimated protein needs 66-80 (1 0-1 2 gms/kg IBW )   Estimated fluid needs 7706-1109 (30-35 ml/kg IBW )      Weight on Day of Weight Loss Surgery: 252 5#  Weight in (lb) to have BMI = 25: 146 4#  Pre-Op Excess Wt: 106 1#  Post-Op Wt Loss: 67 5#/ 60% EBWL in 1 year year(s)    Review of History and Medications   Past Medical History:   Diagnosis Date    Acute bronchitis     Anxiety     Arthritis     Asthma     Bariatric surgery status     Continuous positive airway pressure dependence     CPAP (continuous positive airway pressure) dependence     Depression     Diabetes mellitus     GERD (gastroesophageal reflux disease)     Hyperlipidemia     Hypertension     Morbid obesity     Postsurgical malabsorption     Sleep apnea     USES C PAP     Past Surgical History:   Procedure Laterality Date    BREAST SURGERY      bilat lumpectomy     SECTION      twice    CHOLECYSTECTOMY      EGD      HYSTERECTOMY      KNEE ARTHROSCOPY Bilateral     FL LAP, CLARE RESTRICT PROC, LONGITUDINAL GASTRECTOMY N/A 10/21/2019    Procedure: LAPAROSCOPIC SLEEVE GASTRECTOMY AND INTRAOPERATIVE EGD;  Surgeon: Daya Dominique MD;  Location: [facility] OR;  Service: Bariatrics    WISDOM TOOTH EXTRACTION       Social History     Socioeconomic History    Marital status: /Civil Union     Spouse name: Not on file    Number of children: Not on file    Years of education: Not on file    Highest education level: Not on file   Occupational History    Not on file   Social Needs    Financial resource strain: Not on file    Food insecurity     Worry: Not on file     Inability: Not on file    Transportation needs     Medical: Not on file     Non-medical: Not on file   Tobacco Use    Smoking status: Never Smoker    Smokeless tobacco: Never Used   Substance and Sexual Activity    Alcohol use: Not Currently     Frequency: Monthly or less     Comment: occasional    Drug use: No    Sexual activity: Yes     Partners: Male   Lifestyle    Physical activity     Days per week: Not on file     Minutes per session: Not on file    Stress: Not on file   Relationships    Social connections     Talks on phone: Not on file     Gets together: Not on file     Attends Adventism service: Not on file     Active member of club or organization: Not on file     Attends meetings of clubs or organizations: Not on file     Relationship status: Not on file    Intimate partner violence     Fear of current or ex partner: Not on file     Emotionally abused: Not on file     Physically abused: Not on file     Forced sexual activity: Not on file   Other Topics Concern    Not on file   Social History Narrative    Not on file       Current Outpatient Medications:     acetaminophen (TYLENOL) 325 mg tablet, Take 3 tablets (975 mg total) by mouth every 8 (eight) hours, Disp: 30 tablet, Rfl: 0    albuterol (PROVENTIL HFA,VENTOLIN HFA) 90 mcg/act inhaler, Inhale 2 puffs every 4 (four) hours as needed , Disp: , Rfl:     ALPRAZolam (XANAX) 0 25 mg tablet, Take 0 25 mg by mouth daily at bedtime as needed , Disp: , Rfl:     amLODIPine (NORVASC) 10 mg tablet, Take 10 mg by mouth daily, Disp: , Rfl:     bisoprolol (ZEBETA) 10 MG tablet, Take 10 mg by mouth 2 (two) times a day, Disp: , Rfl:     chlorthalidone 25 mg tablet, , Disp: , Rfl:     diclofenac sodium (Voltaren) 1 %, Apply 2 g topically 3 (three) times a day, Disp: , Rfl:     glucose blood test strip, Check blood sugar QID, Disp: , Rfl:     lamoTRIgine (LaMICtal) 200 MG tablet, Take 200 mg by mouth daily , Disp: , Rfl:     losartan (COZAAR) 100 MG tablet, , Disp: , Rfl:     metaxalone (SKELAXIN) 800 mg tablet, Take 1 tablet (800 mg total) by mouth 3 (three) times a day as needed for muscle spasms, Disp: 21 tablet, Rfl: 0    omeprazole (PriLOSEC) 20 mg delayed release capsule, Take 1 capsule (20 mg total) by mouth 2 (two) times a day, Disp: 180 capsule, Rfl: 2    Sodium Hyaluronate (EUFLEXXA) 20 MG/2ML SOSY, knee injection every 6 months, Disp: , Rfl:     TRAMADOL HCL PO, Take 50 mg by mouth every 6 (six) hours as needed , Disp: , Rfl:     venlafaxine (EFFEXOR-XR) 37 5 mg 24 hr capsule, , Disp: , Rfl:     venlafaxine (EFFEXOR-XR) 75 mg 24 hr capsule, Take 75 mg by mouth daily , Disp: , Rfl:     Food Intake and Lifestyle Assessment   Food Intake Assessment completed via usual diet recall  Breakfast: protein bar - 8am or protein muffin, omelot  Snack: greek yogurt - 10am   Lunch: homemade pizza on tortilla 12 pm - usually a salad  Snack: pretzels, sugar free klondike bars - 3  Dinner: salad, chicken or shrimp or steak, protein and vegetabale - 6 or 7  Snack: klondike sugar free, pretzels  Beverage intake: powdered iced tea with crystal light 30 oz, diet green tea 16 x2, water 16 x2, coffee 2-3 cups coffee (1/2 and 1/2)  Diet texture/stage: regular  Protein supplement: yes, bar  Estimated protein intake per day: 65-75  Estimated fluid intake per day: 110 oz  Meals eaten away from home: once a week  Typical meal pattern: 3 meals per day and 3 snacks per day  Eating Behaviors: Frequent snacking/ grazing stress eating and eating while working  Food allergies or intolerances: no  Cultural or Jain considerations: no    Physical Assessment  Nutrition Related Findings  Return of Hunger    Physical Activity  Types of exercise: go to gym strength (once a week), use eliptical at home (3-4 days a week) - 20-40 minutes, walking puppy  Current physical limitations: knees    Psychosocial Assessment   Support systems: friends and daughter  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    Nutrition Diagnosis  Diagnosis: Overweight / Obesity (NC-3 3)  Related to: Excessive energy intake and grazing/mindless eating  As Evidenced by: BMI >25 and client interview     Interventions and Teaching   Patient educated on post-op nutrition guidelines  Patient educated and handouts provided  Adequate hydration  Weight loss plateaus/ possibility of weight regain  Exercise  Nutrition considerations after surgery  Protein supplements  Dietary and lifestyle changes  Possible problems with poor eating habits  Intuitive eating  Techniques for self monitoring and keeping daily food journal    Education provided to: patient    Barriers to learning: No barriers identified    Readiness to change: monitoring and relapsing    Comprehension: verbalizes understanding     Expected Compliance: excellent    Evaluation/Monitoring   Eating pattern as discussed Body weight Physical activity  Patient has increased stress  Restructuring at her job and so now she has double employees to manage  Finding it difficult to stop working and take time for self care  As a result she is not planning meals and tends to graze more during the day  Really struggling with food cravings in which medication may help in addition with other stress reduction techniques such as deep breathing and exercise     Patient met with LCSW today who also discussed importance of stress reduction, work life balance and importance of food logging     Goals  Exercise 30 minutes 5 times per week and Eliminate mindless snacking   Stop grazing - eliminate all pretzels and snacks   Continue elliptical training 3-4 days per week for 20-40 minute  Return to the gym - set a time to stop working and go to the gym for strength training and aerobic activity  Practice deep breathing with calm or insight timer to help reduce stress  Food log 1200 calories and 75 grams of protein   F/U with PA-C for medication and then schedule f/u with RD/SW     Time Spent:   30 minutes

## 2021-01-15 ENCOUNTER — OFFICE VISIT (OUTPATIENT)
Dept: BARIATRICS | Facility: CLINIC | Age: 56
End: 2021-01-15

## 2021-01-15 VITALS — WEIGHT: 197.3 LBS | HEIGHT: 64 IN | BODY MASS INDEX: 33.68 KG/M2

## 2021-01-15 DIAGNOSIS — Z98.84 BARIATRIC SURGERY STATUS: Primary | ICD-10-CM

## 2021-01-15 DIAGNOSIS — E66.9 OBESITY, CLASS I, BMI 30-34.9: ICD-10-CM

## 2021-01-15 PROCEDURE — RECHECK

## 2021-01-15 NOTE — PATIENT INSTRUCTIONS
Goals  Exercise 30 minutes 5 times per week and Eliminate mindless snacking   Stop grazing - eliminate all pretzels and snack   Continue elliptical training 3-4 days per week for 20-40 minutes each day and walking puppy each day  Return to the gym - set a time to stop working and go to the gym for strength training and aerobic activity  Practice deep breathing with calm or insight timer to help reduce stress  Food log 1200 calories and 75 grams of protein   F/U with PA-C for medication and then schedule f/u with RD/SW

## 2021-01-15 NOTE — PROGRESS NOTES
Behavioral Health Follow Up Note  [address] presented for a follow up for weight loss support  10/21/2019: bariatric surgery/sleeve  Struggling with head hunger  Feels she is not getting emotional satisfaction with food and may graze until she feels satisfied  Working long hours at home [employer]) and not giving herself time  Not exercising or going to the gym  Not logging her food  Stated she will start and bring the log next visit  Has a trip planned for end of February to go to [place] (girls trip)  Motivation due to wearing a bathing suit  Has an elliptical in the home and not using  Decided that when she thinks of food she will use for 10 minutes for a distraction  Has a puppy that does get walks, but more by her daughter  Frustrated with her weight

## 2021-01-18 ENCOUNTER — OFFICE VISIT (OUTPATIENT)
Dept: BARIATRICS | Facility: CLINIC | Age: 56
End: 2021-01-18
Payer: COMMERCIAL

## 2021-01-18 ENCOUNTER — HOSPITAL ENCOUNTER (OUTPATIENT)
Dept: NON INVASIVE DIAGNOSTICS | Facility: HOSPITAL | Age: 56
Discharge: HOME | End: 2021-01-18
Payer: COMMERCIAL

## 2021-01-18 VITALS
DIASTOLIC BLOOD PRESSURE: 72 MMHG | BODY MASS INDEX: 34.15 KG/M2 | SYSTOLIC BLOOD PRESSURE: 126 MMHG | TEMPERATURE: 98.7 F | WEIGHT: 200 LBS | RESPIRATION RATE: 20 BRPM | HEART RATE: 88 BPM | HEIGHT: 64 IN

## 2021-01-18 DIAGNOSIS — Z76.89 ENCOUNTER PRIOR TO INITIATION OF MEDICATION: ICD-10-CM

## 2021-01-18 DIAGNOSIS — I10 ESSENTIAL HYPERTENSION: ICD-10-CM

## 2021-01-18 DIAGNOSIS — Z98.84 BARIATRIC SURGERY STATUS: ICD-10-CM

## 2021-01-18 DIAGNOSIS — Z48.815 ENCOUNTER FOR SURGICAL AFTERCARE FOLLOWING SURGERY OF DIGESTIVE SYSTEM: ICD-10-CM

## 2021-01-18 DIAGNOSIS — E66.9 OBESITY, CLASS I, BMI 30-34.9: Primary | ICD-10-CM

## 2021-01-18 DIAGNOSIS — F32.A DEPRESSION, UNSPECIFIED DEPRESSION TYPE: ICD-10-CM

## 2021-01-18 DIAGNOSIS — E66.9 OBESITY, CLASS I, BMI 30-34.9: ICD-10-CM

## 2021-01-18 LAB
ATRIAL RATE: 46 BPM
P AXIS: 56 DEGREES
PR INTERVAL: 142 MS
QRS AXIS: -23 DEGREES
QRSD INTERVAL: 98 MS
QT INTERVAL: 482 MS
QTC INTERVAL: 421 MS
T WAVE AXIS: 20 DEGREES
VENTRICULAR RATE: 46 BPM

## 2021-01-18 PROCEDURE — 93005 ELECTROCARDIOGRAM TRACING: CPT

## 2021-01-18 PROCEDURE — 93010 ELECTROCARDIOGRAM REPORT: CPT | Performed by: INTERNAL MEDICINE

## 2021-01-18 PROCEDURE — 99214 OFFICE O/P EST MOD 30 MIN: CPT | Performed by: PHYSICIAN ASSISTANT

## 2021-01-18 NOTE — PATIENT INSTRUCTIONS
We need to check a baseline EKG prior to considering start of phentermine  Once this is back-if ok-will start on 1/2 tablet daily before breakfast  While on phentermine check her resting blood pressure and pulse at least 3 times per week for example you may do this Monday morning Wednesday afternoon or Friday night  Your blood pressure should not be 140/90 or higher angle holes should be between 60 and 100 beats per min at notify the provider either are consistently elevated  Phentermine has potential side effects of increased heart rate, increased blood pressure, palpitations, headache, dizziness, insomnia,, altered mood, abdominal upset and dry mouth  Notify the provider with change in mood  Proceed to the emergency room with any suicidal ideation or homicidal ideation  Phentermine should be stopped prior to surgery  Notify the provider with any changes in vision  Follow-up in 1 months  We kindly ask that you arrive 15 minutes before  your scheduled appointment time with your provider to allow for our staff to room you and check your vital signs and update your chart  We thank you for your patience at your visit  Your appointment time is reserved only for you  If you are unable to make your scheduled visit, we would request that you call to cancel and reschedule it at your earliest convenience  Follow diet as discussed  Start food logging  Goal of 3073-7807 calories and 70-80 grams of protein per day  Start regular exercising  Goal is 30 minutes of cardio most days and then 2-days of weight resistance/exercise   Follow-30/60 minute rule and drink at least 64 ounces of fluid per day        Follow vitamin  and mineral recommendations as reviewed with you  Bariatric vitamins are highly recommended  Vitamins are important for a life-time to avoid low levels which can lead to other medical problems    Exercise as tolerated  Call our office if you have any problems with abdominal pain especially associated with fever, chills, nausea, vomiting or any other concerns  All  Post-bariatric surgery patients should be aware that very small quantities of any alcohol  can cause impairment and it is very possible not to feel the effect  The effect can be in the system for several hours  It is also a stomach irritant  It is advised to AVOID alcohol, Nonsteroidal antiinflammatory drugs (NSAIDS) and nicotine of all forms   Any of these can cause stomach irritation/pain  Recommendation : Most, if not all post-gastric surgery patients would benefit from having a regular counselor for at least 2 years post-op to help with need for multiple life-style changes  If you want to do this and need help finding a regular counselor you can also make a follow-up with our  to help you find one      You could try OTC devrom-for deoderizing-check with pharmacist to make sure there are no medication interactions

## 2021-01-18 NOTE — ASSESSMENT & PLAN NOTE
-s/p Vertical Sleeve Gastrectomy with Dr Tomer Xiong on 10/21/2019  Overall doing Fair  Initial: 270 7 lb  Current:200 lb  Goal of 5-10% Weight loss  Up a net 8 1/2 lb from November visit-had stopped topiramate between visits as it was causing her to be more irritable/tearful  She was on phentermine in the past (while on current same medications and notes she tolerated this in the past  EWL:52%  Vance:185 lb  Current BMI is Body mass index is 34 33 kg/m²      Tolerating a regular diet-yes

## 2021-01-18 NOTE — ASSESSMENT & PLAN NOTE
BP and pulse controlled in the office/on medication/followed by PCP    Advised if we consider phentermine she will need to monitor bp and pulse at home while on medication/written instructions provided in AVS    Will check baseline EKG prior to starting phentermine and will start with 1/2 dose if EKG is ok

## 2021-01-18 NOTE — ASSESSMENT & PLAN NOTE
Advised if we start phentermine she should let us know if mood changes and advised if any SI or HI she should seek immediate medical attention  Will await EKG first

## 2021-01-18 NOTE — ASSESSMENT & PLAN NOTE
Patient here in follow-up to assess weight  Has met with RD and   Tearful today as she is snacking more frequently doesn't feel she can manage diet for weight loss  Tried topiramate but it made her too emotional  She has been on phentermine in the past and did well on that-was on lamictal and effexor then and notes she tolerated it well  Advised on side effects of phentermine and that we need baseline EKG first before starting this  She denies hx CAD, PAD, glaucoma, palpitations, or arrthmia    Advised if we start the medication we would start on lower dose-advised on need to monitor mood, BP/pulse and for side effects  Written instructions provided on monitoring BP/pulse in her AVS     She wants to try  Ordering EKG now  Advised on watching calories and protein per RD/around 1200 calories and 75 grams of protein as starting place  Advised on importance of food logging and exercise  She appears receptive to same  A 24 hour diet recall was obtained and advised that diet and life-style changes are important for long-term success    Greater than 50% of the 25 minute visit was spent on education as above

## 2021-01-18 NOTE — PROGRESS NOTES
Assessment/Plan:        Obesity, Class I, BMI 30-34 9  Patient here in follow-up to assess weight  Has met with RD and   Tearful today as she is snacking more frequently doesn't feel she can manage diet for weight loss  Tried topiramate but it made her too emotional  She has been on phentermine in the past and did well on that-was on lamictal and effexor then and notes she tolerated it well  Advised on side effects of phentermine and that we need baseline EKG first before starting this  She denies hx CAD, PAD, glaucoma, palpitations, or arrthmia    Advised if we start the medication we would start on lower dose-advised on need to monitor mood, BP/pulse and for side effects  Written instructions provided on monitoring BP/pulse in her AVS     She wants to try  Ordering EKG now  Advised on watching calories and protein per RD/around 1200 calories and 75 grams of protein as starting place  Advised on importance of food logging and exercise  She appears receptive to same  A 24 hour diet recall was obtained and advised that diet and life-style changes are important for long-term success  Greater than 50% of the 25 minute visit was spent on education as above    Essential hypertension  BP and pulse controlled in the office/on medication/followed by PCP    Advised if we consider phentermine she will need to monitor bp and pulse at home while on medication/written instructions provided in AVS    Will check baseline EKG prior to starting phentermine and will start with 1/2 dose if EKG is ok    Encounter for surgical aftercare following surgery of digestive system  -s/p Vertical Sleeve Gastrectomy with Dr Riya Zhou on 10/21/2019  Overall doing Fair      Initial: 270 7 lb  Current:200 lb  Goal of 5-10% Weight loss  Up a net 8 1/2 lb from November visit-had stopped topiramate between visits as it was causing her to be more irritable/tearful  She was on phentermine in the past (while on current same medications and notes she tolerated this in the past  EWL:52%  Vance:185 lb  Current BMI is Body mass index is 34 33 kg/m²  Tolerating a regular diet-yes      Depression  Advised if we start phentermine she should let us know if mood changes and advised if any SI or HI she should seek immediate medical attention  Will await EKG first       Diagnoses and all orders for this visit:    Obesity, Class I, BMI 30-34 9  -     ECG 12 lead; Future    Encounter prior to initiation of medication  -     ECG 12 lead; Future    Encounter for surgical aftercare following surgery of digestive system    Essential hypertension    Depression, unspecified depression type    Bariatric surgery status  -     ECG 12 lead; Future          Subjective:      Patient ID: Long Preciado is a 50 y o  female  She is here in follow-up to help with weight loss  She had stopped taking topiramate as she was getting more irritable/emotional on the medication  Met with RD/ between visits to help with diet and life-style changes  She is up a net 9 1/2 lb from visit in November  Reports snacking more and doesn't feel she can control diet/having cravings  Staying active but not regularly exercising      The following portions of the patient's history were reviewed and updated as appropriate: allergies, current medications, past family history, past medical history, past social history, past surgical history and problem list     Review of Systems   Constitutional: Positive for unexpected weight change (weight gain)  Gastrointestinal: Negative for abdominal pain  Psychiatric/Behavioral: Negative for suicidal ideas  Objective:      /72   Pulse 88   Temp 98 7 °F (37 1 °C)   Resp 20   Ht 5' 4" (1 626 m)   Wt 90 7 kg (200 lb)   BMI 34 33 kg/m²          Physical Exam  Constitutional:       Appearance: She is obese  She is not ill-appearing        Comments: Tearful when discussing weight   Cardiovascular:      Rate and Rhythm: Normal rate and regular rhythm  Pulmonary:      Effort: Pulmonary effort is normal    Abdominal:      Comments: Obese abdomen   Neurological:      Mental Status: She is alert and oriented to person, place, and time     Psychiatric:      Comments: Tearful during part of visit; otherwise mood appropriate to situation

## 2021-01-25 ENCOUNTER — TELEPHONE (OUTPATIENT)
Dept: BARIATRICS | Facility: CLINIC | Age: 56
End: 2021-01-25

## 2021-01-25 NOTE — TELEPHONE ENCOUNTER
Pt  Called she had an OV last week and was sent for an EKG before medication could be prescribed  Pt  Had EKD done last week and is wondering what the next step is, results are in her chart

## 2021-01-26 DIAGNOSIS — E66.9 OBESITY, CLASS I, BMI 30-34.9: Primary | ICD-10-CM

## 2021-01-26 RX ORDER — PHENTERMINE HYDROCHLORIDE 37.5 MG/1
37.5 TABLET ORAL DAILY
Qty: 15 TABLET | Refills: 1 | Status: CANCELLED | OUTPATIENT
Start: 2021-01-26 | End: 2021-02-25

## 2021-01-26 RX ORDER — PHENTERMINE HYDROCHLORIDE 37.5 MG/1
37.5 TABLET ORAL DAILY
Qty: 15 TABLET | Refills: 1 | Status: CANCELLED | OUTPATIENT
Start: 2021-01-26 | End: 2021-02-10

## 2021-01-27 DIAGNOSIS — E66.9 OBESITY, CLASS II, BMI 35-39.9: Primary | ICD-10-CM

## 2021-01-27 RX ORDER — PHENTERMINE HYDROCHLORIDE 37.5 MG/1
18.75 TABLET ORAL
Qty: 15 TABLET | Refills: 1 | Status: SHIPPED | OUTPATIENT
Start: 2021-01-27 | End: 2021-02-26

## 2021-02-11 NOTE — PROGRESS NOTES
Bariatric Follow Up Nutrition Note    Type of surgery  Vertical sleeve gastrectomy  Surgery Date: 2019  15 months   post-op  Surgeon: Dr Harrington Areas  50 y o   female     Ht 5' 4" (1 626 m)   Wt 89 5 kg (197 lb 4 8 oz)   BMI 33 87 kg/m²    Patient maintained weight since last RD appointment within 1 pounds  Had small uptick with PA-C appointment due to increased sodium intake     Wt Readings from Last 3 Encounters:   21 90 7 kg (200 lb)   01/15/21 89 5 kg (197 lb 4 8 oz)   20 87 7 kg (193 lb 6 4 oz)       [place] [facility]  [place] Equation:  1622  Estimated calories for weight loss 950-1450 ( 1-2# per wk wt loss - sedentary )  Estimated protein needs 66-80 (1 0-1 2 gms/kg IBW )   Estimated fluid needs 2842-0029 (30-35 ml/kg IBW )      Weight on Day of Weight Loss Surgery: 252 5#  Weight in (lb) to have BMI = 25: 146 4#  Pre-Op Excess Wt: 106 1#  Post-Op Wt Loss: 67 5#/ 60% EBWL in 1 year year(s)    Review of History and Medications   Past Medical History:   Diagnosis Date    Acute bronchitis     Anxiety     Arthritis     Asthma     Bariatric surgery status     Continuous positive airway pressure dependence     CPAP (continuous positive airway pressure) dependence     Depression     Diabetes mellitus     GERD (gastroesophageal reflux disease)     Hyperlipidemia     Hypertension     Morbid obesity     Postsurgical malabsorption     Sleep apnea     USES C PAP     Past Surgical History:   Procedure Laterality Date    BREAST SURGERY      bilat lumpectomy     SECTION      twice    CHOLECYSTECTOMY      EGD      HYSTERECTOMY      KNEE ARTHROSCOPY Bilateral     GA LAP, CLARE RESTRICT PROC, LONGITUDINAL GASTRECTOMY N/A 10/21/2019    Procedure: LAPAROSCOPIC SLEEVE GASTRECTOMY AND INTRAOPERATIVE EGD;  Surgeon: Xiomara Hoffmann MD;  Location: [facility];  Service: Bariatrics    WISDOM TOOTH EXTRACTION       Social History     Socioeconomic History    Marital status: /Civil Union     Spouse name: Not on file    Number of children: Not on file    Years of education: Not on file    Highest education level: Not on file   Occupational History    Not on file   Social Needs    Financial resource strain: Not on file    Food insecurity     Worry: Not on file     Inability: Not on file    Transportation needs     Medical: Not on file     Non-medical: Not on file   Tobacco Use    Smoking status: Never Smoker    Smokeless tobacco: Never Used   Substance and Sexual Activity    Alcohol use: Not Currently     Frequency: Monthly or less     Comment: occasional    Drug use: No    Sexual activity: Yes     Partners: Male   Lifestyle    Physical activity     Days per week: Not on file     Minutes per session: Not on file    Stress: Not on file   Relationships    Social connections     Talks on phone: Not on file     Gets together: Not on file     Attends [employer] service: Not on file     Active member of club or organization: Not on file     Attends meetings of clubs or organizations: Not on file     Relationship status: Not on file    Intimate partner violence     Fear of current or ex partner: Not on file     Emotionally abused: Not on file     Physically abused: Not on file     Forced sexual activity: Not on file   Other Topics Concern    Not on file   Social History Narrative    Not on file       Current Outpatient Medications:     acetaminophen (TYLENOL) 325 mg tablet, Take 3 tablets (975 mg total) by mouth every 8 (eight) hours, Disp: 30 tablet, Rfl: 0    albuterol (PROVENTIL HFA,VENTOLIN HFA) 90 mcg/act inhaler, Inhale 2 puffs every 4 (four) hours as needed , Disp: , Rfl:     ALPRAZolam (XANAX) 0 25 mg tablet, Take 0 25 mg by mouth daily at bedtime as needed , Disp: , Rfl:     amLODIPine (NORVASC) 10 mg tablet, Take 10 mg by mouth daily, Disp: , Rfl:     bisoprolol (ZEBETA) 10 MG tablet, Take 10 mg by mouth 2 (two) times a day, Disp: , Rfl:     chlorthalidone 25 mg tablet, , Disp: , Rfl:     diclofenac sodium (Voltaren) 1 %, Apply 2 g topically 3 (three) times a day, Disp: , Rfl:     glucose blood test strip, Check blood sugar QID, Disp: , Rfl:     lamoTRIgine (LaMICtal) 200 MG tablet, Take 200 mg by mouth daily , Disp: , Rfl:     losartan (COZAAR) 100 MG tablet, , Disp: , Rfl:     omeprazole (PriLOSEC) 20 mg delayed release capsule, Take 1 capsule (20 mg total) by mouth 2 (two) times a day, Disp: 180 capsule, Rfl: 2    phentermine (ADIPEX-P) 37 5 MG tablet, Take 0 5 tablets (18 75 mg total) by mouth daily before breakfast, Disp: 15 tablet, Rfl: 1    Sodium Hyaluronate (EUFLEXXA) 20 MG/2ML SOSY, knee injection every 6 months, Disp: , Rfl:     TRAMADOL HCL PO, Take 50 mg by mouth every 6 (six) hours as needed , Disp: , Rfl:     venlafaxine (EFFEXOR-XR) 37 5 mg 24 hr capsule, , Disp: , Rfl:     venlafaxine (EFFEXOR-XR) 75 mg 24 hr capsule, Take 75 mg by mouth daily , Disp: , Rfl:     Food Intake and Lifestyle Assessment   Food Intake Assessment completed via usual diet recall  Patient has started keeping food log in [facility]   Per [facility] 1417 kcal, 59 Carb, 84 Fat, 109 Protein and 4 gram fiber   Breakfast: Crack an egg with 2 eggs   Snack: 0  Lunch: 12 pm - usually a salad with protein or chili   Snack: 0  Dinner: schicken or shrimp or steak, protein and vegetabale - 6 or 7  Snack: protein bar or sugar free pudding   Beverage intake: powdered iced tea with crystal light 30 oz, diet green tea 16 x2, water 16 x2, coffee 2-3 cups coffee (1/2 and 1/2)- has decreased coffee intake   Diet texture/stage: regular  Protein supplement: yes, one to two bars per day   Estimated protein intake per day: 65-75  Estimated fluid intake per day: 110 oz  Meals eaten away from home: once a week  Typical meal pattern: 3 meals per day and 1-2 snacks per day  Eating Behaviors: Frequent snacking/ grazing stress eating and eating while working  [employer] allergies or intolerances: no  Cultural or Episcopal considerations: no    Physical Assessment  Nutrition Related Findings  Return of Hunger- reports improvement in hunger and satiety with initiation of phenteramine     Physical Activity  Types of exercise:, use eliptical at home (6-7 days a week) - 20-60 minutes per day in small increments of 5 to 10 minutes at a time , walking puppy for 10 minutes   Current physical limitations: knees    Psychosocial Assessment   Support systems: friends and daughter  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    Nutrition Diagnosis( continued )   Diagnosis: Overweight / Obesity (NC-3 3)  Related to: Excessive energy intake and grazing/mindless eating  As Evidenced by: BMI >25 and client interview     Interventions and Teaching   Patient educated on post-op nutrition guidelines  Patient educated and handouts provided  Adequate hydration  Weight loss plateaus/ possibility of weight regain  Exercise  Nutrition considerations after surgery  Protein supplements  Dietary and lifestyle changes  Possible problems with poor eating habits  Intuitive eating  Techniques for self monitoring and keeping daily food journal  Reinforced good behaviors patient has implemented- increased activity, deep breathing, food logging f/u with PA and start of medication to suppress appetite     Education provided to: patient    Barriers to learning: No barriers identified    Readiness to change: monitoring and relapsing    Comprehension: verbalizes and demonstarted  understanding     Expected Compliance: excellent    Evaluation/Monitoring   Eating pattern as discussed Body weight Physical activity  Patient has started to prioritize self care  She is taking time to use ellipitcal at home for 10 minutes intervals    Use white board calendar and documents minutes of activity per day and her weight which she finds motivating to see what she is a accomplishing  She is keeping food log in [employer] and has decreased her grazing with help of medication  Would like increase in medication to further help with cravings and satiety  Patient met with LCSW today who also discussed importance of stress reduction, work life balance and counseling   Gave up pretzels and decreased grazing  She is able to decrease her intake of sweets and eat a better option in stead such as a protein bar  Happy with her progress thus far  Patient has 2 trips planned , one in March and one in [place] to [place]    Discussed maintaining activity and limiting food intake while on vacation     Goals  Exercise 30 minutes 5 times per week and Eliminate mindless snacking   Continue elliptical training 5-7 days per week for 20-60 minute broken up in 10-15 increments   Continue to use whiteboard to document exercise - 5 minutes or  More every day   Practice deep breathing with calm or insight timer to help reduce stress  Food log 1200 calories and 75 grams of protein   F/U with PA-C for medication adjustment to further help with appetite    F/U in one month with RD and LCSW     Time Spent:   30 minutes

## 2021-02-12 ENCOUNTER — OFFICE VISIT (OUTPATIENT)
Dept: BARIATRICS | Facility: CLINIC | Age: 56
End: 2021-02-12

## 2021-02-12 VITALS — BODY MASS INDEX: 33.68 KG/M2 | HEIGHT: 64 IN | WEIGHT: 197.3 LBS

## 2021-02-12 DIAGNOSIS — E66.9 OBESITY, CLASS I, BMI 30-34.9: Primary | ICD-10-CM

## 2021-02-12 PROCEDURE — RECHECK

## 2021-02-12 NOTE — PROGRESS NOTES
Behavioral Health Follow Up Note:    Started medication for appetite last week  tarted at a low dose and feels it could increase  Does not have any side effects and still has mild cravings  Feels she has been able to control more and more mindful  Feels the compulsion to eat but choosing better options  Provided resources for therapist specializing in eating disorders  Started exercising on her elliptical   several times a day in little spurts  totaling 20 -60 mins  per day  Noticing a change in her body structure  trying to not focus on a number  Started journaling her food intake  Protein around 75-90

## 2021-02-26 ENCOUNTER — OFFICE VISIT (OUTPATIENT)
Dept: BARIATRICS | Facility: CLINIC | Age: 56
End: 2021-02-26
Payer: COMMERCIAL

## 2021-02-26 VITALS
HEIGHT: 64 IN | DIASTOLIC BLOOD PRESSURE: 70 MMHG | HEART RATE: 75 BPM | BODY MASS INDEX: 33.63 KG/M2 | TEMPERATURE: 97.2 F | WEIGHT: 197 LBS | SYSTOLIC BLOOD PRESSURE: 120 MMHG

## 2021-02-26 DIAGNOSIS — Z48.815 ENCOUNTER FOR SURGICAL AFTERCARE FOLLOWING SURGERY OF DIGESTIVE SYSTEM: ICD-10-CM

## 2021-02-26 DIAGNOSIS — Z98.84 BARIATRIC SURGERY STATUS: ICD-10-CM

## 2021-02-26 DIAGNOSIS — E66.9 OBESITY, CLASS I, BMI 30-34.9: Primary | ICD-10-CM

## 2021-02-26 PROCEDURE — 99213 OFFICE O/P EST LOW 20 MIN: CPT | Performed by: PHYSICIAN ASSISTANT

## 2021-02-26 RX ORDER — PHENTERMINE HYDROCHLORIDE 37.5 MG/1
37.5 TABLET ORAL
Qty: 30 TABLET | Refills: 1 | Status: SHIPPED | OUTPATIENT
Start: 2021-02-26 | End: 2021-05-26 | Stop reason: ALTCHOICE

## 2021-02-26 NOTE — ASSESSMENT & PLAN NOTE
She is down a net 3 lb from seeing me last-on 1/2 dose phentermine  She notes some improvement with 1/2 dose phentermine   She is working with our staff and finding this helpful    Will increase to full tablet of phentermine  She denies anxiety, palpitations   Mood has been good

## 2021-02-26 NOTE — PROGRESS NOTES
Assessment/Plan:    Obesity, Class I, BMI 30-34 9  She is down a net 3 lb from seeing me last-on 1/2 dose phentermine  She notes some improvement with 1/2 dose phentermine   She is working with our staff and finding this helpful    Will increase to full tablet of phentermine  She denies anxiety, palpitations  Mood has been good        Encounter for surgical aftercare following surgery of digestive system  -s/p Vertical Sleeve Gastrectomy with Dr Riya Zhou on 10/21/2019  Here in follow-up on 1/2 dose phentermine BP and pulse has been ok-she had some bradycardia on pulses/normal in the office today  Baseline EKG was normal    Initial:270 7 lb  Initial with medication: 200 lb-goal of 5-10 % weight loss  Current:197 lb  EWL:55%  Vance:185 lb  Current BMI is Body mass index is 33 81 kg/m²  Tolerating a regular diet-yes    She is trying to watch a healthy diet and has increased her exercise-she plans to continue to work with RD/         Diagnoses and all orders for this visit:    Obesity, Class I, BMI 30-34 9  -     phentermine (ADIPEX-P) 37 5 MG tablet; Take 1 tablet (37 5 mg total) by mouth daily before breakfast    Encounter for surgical aftercare following surgery of digestive system  -     phentermine (ADIPEX-P) 37 5 MG tablet; Take 1 tablet (37 5 mg total) by mouth daily before breakfast    Bariatric surgery status          Subjective:      Patient ID: Júnior Roberson is a 50 y o  female  She is here in follow-up to assess diet, weight on 1/2 tablet of phentermine  She is tolerating a regular diet  She denies chest pain, palpitations or mood issues on the medication  Has lost a couple pounds   Is working with RD/ for healthy eating habits  Tracking bp and pulse-bp normal and pulse on the bradycardic side/pre-rx EKG was acceptable      The following portions of the patient's history were reviewed and updated as appropriate: allergies, current medications, past family history, past medical history, past social history, past surgical history and problem list     Review of Systems   Constitutional: Negative for unexpected weight change (planned weight loss)  Cardiovascular: Negative for chest pain and palpitations  Gastrointestinal: Negative for abdominal pain  Psychiatric/Behavioral: Negative for suicidal ideas  Objective:      /70 (BP Location: Left arm, Patient Position: Sitting)   Pulse 75   Temp (!) 97 2 °F (36 2 °C)   Ht 5' 4" (1 626 m)   Wt 89 4 kg (197 lb)   BMI 33 81 kg/m²          Physical Exam  Constitutional:       Appearance: She is obese  She is not ill-appearing  Pulmonary:      Effort: Pulmonary effort is normal    Abdominal:      Comments: Obese abdomen   Neurological:      Mental Status: She is alert and oriented to person, place, and time     Psychiatric:         Mood and Affect: Mood normal

## 2021-02-26 NOTE — ASSESSMENT & PLAN NOTE
-s/p Vertical Sleeve Gastrectomy with Dr Arianna Cortes on 10/21/2019  Here in follow-up on 1/2 dose phentermine BP and pulse has been ok-she had some bradycardia on pulses/normal in the office today  Baseline EKG was normal    Initial:270 7 lb  Initial with medication: 200 lb-goal of 5-10 % weight loss  Current:197 lb  EWL:55%  Vance:185 lb  Current BMI is Body mass index is 33 81 kg/m²      Tolerating a regular diet-yes    She is trying to watch a healthy diet and has increased her exercise-she plans to continue to work with RD/

## 2021-02-26 NOTE — PATIENT INSTRUCTIONS
Continue to watch healthy diet  She is going to vacation this week-advised to stay on 1/2 tablet phentermine and when she comes back can increase to once daily  She can call with any concerns      Follow-up in 4-6 weeks

## 2021-03-04 ENCOUNTER — TELEPHONE (OUTPATIENT)
Dept: BARIATRICS | Facility: CLINIC | Age: 56
End: 2021-03-04

## 2021-03-04 NOTE — TELEPHONE ENCOUNTER
Left voicemail requesting call back to reschedule appointment with antoinette 4/9    Provider will be out

## 2021-03-10 DIAGNOSIS — Z23 ENCOUNTER FOR IMMUNIZATION: ICD-10-CM

## 2021-04-12 ENCOUNTER — OFFICE VISIT (OUTPATIENT)
Dept: BARIATRICS | Facility: CLINIC | Age: 56
End: 2021-04-12
Payer: COMMERCIAL

## 2021-04-12 VITALS
RESPIRATION RATE: 20 BRPM | BODY MASS INDEX: 34.74 KG/M2 | WEIGHT: 203.5 LBS | SYSTOLIC BLOOD PRESSURE: 122 MMHG | DIASTOLIC BLOOD PRESSURE: 70 MMHG | HEART RATE: 62 BPM | TEMPERATURE: 97.8 F | HEIGHT: 64 IN

## 2021-04-12 DIAGNOSIS — E66.9 OBESITY, CLASS I, BMI 30-34.9: Primary | ICD-10-CM

## 2021-04-12 DIAGNOSIS — Z98.84 BARIATRIC SURGERY STATUS: ICD-10-CM

## 2021-04-12 DIAGNOSIS — R73.03 PREDIABETES: ICD-10-CM

## 2021-04-12 PROCEDURE — 99214 OFFICE O/P EST MOD 30 MIN: CPT | Performed by: PHYSICIAN ASSISTANT

## 2021-04-12 NOTE — PROGRESS NOTES
Assessment/Plan:     Diagnoses and all orders for this visit:    Obesity, Class I, BMI 30-34 9  -     metFORMIN (GLUCOPHAGE) 500 mg tablet; Take 1 tablet (500 mg total) by mouth 2 (two) times a day with meals  -     Basic metabolic panel; Future    Prediabetes  -     metFORMIN (GLUCOPHAGE) 500 mg tablet; Take 1 tablet (500 mg total) by mouth 2 (two) times a day with meals  -     Basic metabolic panel; Future    Bariatric surgery status  -     metFORMIN (GLUCOPHAGE) 500 mg tablet; Take 1 tablet (500 mg total) by mouth 2 (two) times a day with meals  -     Basic metabolic panel; Future        s/p Vertical Sleeve Gastrectomy with Dr Darius Mcguire on 10/21/2019  Here for 6 week follow up on phentermine, has been taking 37 5 mg daily  No negative side effects mentioned today  Does not reports much improvement and has gain about 6 lbs since her last visit with Apurva Traore in February  States she just started going back to the gym but still having some trouble with cravings  She is not food logging or tracking calories but planning to get back  On track with this  She also attributes weight gain to emotional/stress eating and is trying to get in with a new therapist to address these concerns  Explained to patient certain medication can have side effect of weight gain, in her case effexor and bisprolol  Patient also on lamictal daily  Her last A1c is 5 7 which shows prediabetes as well  We discussed trying metformin instead to help counteract weight gain side effect of some of her other medication and as well help with her prediabetes  She has been off phentermine for the last week as she never got it refilled since she was waiting for this visit  Will try metformin for now, follow up in 6 weeks after also incorporating diet/lifestyle changes as we discussed today, if no improvement can discuss tapering back on the phentermine  Of note, has tried topamax in the past but had negative side effects   Has GERD (mostly controlled on medication) but would caution with Saxenda  Subjective:      Patient ID: Maria Client is a 50 y o  female  HPI  Follow up on phentermine     The following portions of the patient's history were reviewed and updated as appropriate: allergies, current medications, past family history, past medical history, past social history, past surgical history and problem list     Review of Systems   Constitutional: Negative  Respiratory: Negative  Cardiovascular: Negative  Gastrointestinal: Negative  Neurological: Negative  Psychiatric/Behavioral: Negative  Objective:      /70   Pulse 62   Temp 97 8 °F (36 6 °C)   Resp 20   Ht 5' 4" (1 626 m)   Wt 92 3 kg (203 lb 8 oz)   BMI 34 93 kg/m²          Physical Exam  Vitals signs and nursing note reviewed  Constitutional:       Appearance: Normal appearance  She is obese  HENT:      Head: Normocephalic and atraumatic  Eyes:      Extraocular Movements: Extraocular movements intact  Pupils: Pupils are equal, round, and reactive to light  Neck:      Musculoskeletal: Normal range of motion  Cardiovascular:      Rate and Rhythm: Normal rate  Pulmonary:      Effort: Pulmonary effort is normal    Musculoskeletal: Normal range of motion  Skin:     General: Skin is warm and dry  Neurological:      General: No focal deficit present  Mental Status: She is alert and oriented to person, place, and time     Psychiatric:         Mood and Affect: Mood normal

## 2021-05-10 NOTE — PATIENT INSTRUCTIONS
Patient to call if she does not hear about labs and scheduling vlcd start within a week  Would like to do healthy core after VLCD  List of counselors given to patient today  Asked patient to schedule an appt for counseling now so they can follow her throughout this journey  right neck

## 2021-05-26 ENCOUNTER — OFFICE VISIT (OUTPATIENT)
Dept: BARIATRICS | Facility: CLINIC | Age: 56
End: 2021-05-26
Payer: COMMERCIAL

## 2021-05-26 VITALS
TEMPERATURE: 98.6 F | DIASTOLIC BLOOD PRESSURE: 86 MMHG | WEIGHT: 207 LBS | BODY MASS INDEX: 35.34 KG/M2 | RESPIRATION RATE: 20 BRPM | HEART RATE: 71 BPM | HEIGHT: 64 IN | SYSTOLIC BLOOD PRESSURE: 148 MMHG

## 2021-05-26 DIAGNOSIS — F32.A DEPRESSION, UNSPECIFIED DEPRESSION TYPE: ICD-10-CM

## 2021-05-26 DIAGNOSIS — I10 ESSENTIAL HYPERTENSION: ICD-10-CM

## 2021-05-26 DIAGNOSIS — Z48.815 ENCOUNTER FOR SURGICAL AFTERCARE FOLLOWING SURGERY OF DIGESTIVE SYSTEM: Primary | ICD-10-CM

## 2021-05-26 DIAGNOSIS — Z98.84 BARIATRIC SURGERY STATUS: ICD-10-CM

## 2021-05-26 DIAGNOSIS — K91.2 POSTSURGICAL MALABSORPTION: ICD-10-CM

## 2021-05-26 DIAGNOSIS — E66.9 OBESITY, CLASS II, BMI 35-39.9: ICD-10-CM

## 2021-05-26 PROCEDURE — 99214 OFFICE O/P EST MOD 30 MIN: CPT | Performed by: PHYSICIAN ASSISTANT

## 2021-05-26 NOTE — PROGRESS NOTES
Assessment/Plan:       Diagnoses and all orders for this visit:    Encounter for surgical aftercare following surgery of digestive system    Bariatric surgery status    Postsurgical malabsorption    Obesity, Class II, BMI 35-39 9    Depression, unspecified depression type    Essential hypertension              s/p Vertical Radha Donovan 10/21/2019  Has been following up regarding her weight regain, was previously trialed on phentermine which was not very effective with regards to her weight loss  At last visit was started on metformin  She is not food logging or tracking calories  Has gained 3 lbs since last visit  attributes weight gain to emotional/stress eating and is trying to get in with a new therapist to address these concerns however has been unable to  Was tearful during her visit today and states her depression is much worse and she feels it is affected her ability to get anything done, even to clean her house or take her pills  As a result, did not take her metformin consistently       Explained to patient we will need to get to the root cause of her weight gain which at this time appears to be her worsening depression  Will schedule pt with RD and MARGARITA to help address diet and behavioral issues and she will continue to try and work on finding a therapist  Will hold off on metformin for now as she will also be contacting her psychiatrist to adjust her medications  I will see pt for follow up in 2 months       Subjective:      Patient ID: Christian Lunsford is a 50 y o  female  The following portions of the patient's history were reviewed and updated as appropriate: allergies, current medications, past family history, past medical history, past social history, past surgical history and problem list     Review of Systems   Constitutional: Negative  Respiratory: Negative  Gastrointestinal: Negative  Neurological: Negative      Psychiatric/Behavioral: Positive for dysphoric mood  Negative for suicidal ideas  Objective:      /86   Pulse 71   Temp 98 6 °F (37 °C)   Resp 20   Ht 5' 4" (1 626 m)   Wt 93 9 kg (207 lb)   BMI 35 53 kg/m²          Physical Exam  Vitals signs and nursing note reviewed  Constitutional:       Appearance: Normal appearance  She is obese  Eyes:      Extraocular Movements: Extraocular movements intact  Pupils: Pupils are equal, round, and reactive to light  Neck:      Musculoskeletal: Normal range of motion  Cardiovascular:      Rate and Rhythm: Normal rate  Pulmonary:      Effort: Pulmonary effort is normal    Skin:     General: Skin is warm and dry  Neurological:      General: No focal deficit present  Mental Status: She is alert and oriented to person, place, and time     Psychiatric:      Comments: Tearful

## 2021-07-01 NOTE — PROGRESS NOTES
Bariatric Follow Up Nutrition Note    Type of surgery  Vertical sleeve gastrectomy  Surgery Date: 2019  20 months   post-op  Surgeon: Dr Piotr Harvey  50 y o   female     Ht 5' 4" (1 626 m)   Wt 95 6 kg (210 lb 12 8 oz)   BMI 36 18 kg/m²    Patient gained 13 5# since last RD appointment in February     Wt Readings from Last 3 Encounters:   21 93 9 kg (207 lb)   21 92 3 kg (203 lb 8 oz)   21 89 4 kg (197 lb)       [facility] Equation:  1622  Estimated calories for weight loss 950-1450 ( 1-2# per wk wt loss - sedentary )  Estimated protein needs 66-80 (1 0-1 2 gms/kg IBW )   Estimated fluid needs 4350-1406 (30-35 ml/kg IBW )      Weight on Day of Weight Loss Surgery: 252 5#  Weight in (lb) to have BMI = 25: 146 4#  Pre-Op Excess Wt: 106 1#  Post-Op Wt Loss: 67 5#/ 60% EBWL in 1 year year(s)    Review of History and Medications   Past Medical History:   Diagnosis Date    Acute bronchitis     Anxiety     Arthritis     Asthma     Bariatric surgery status     Continuous positive airway pressure dependence     COVID-19 vaccine series completed 2021    CPAP (continuous positive airway pressure) dependence     Depression     Diabetes mellitus     GERD (gastroesophageal reflux disease)     Hyperlipidemia     Hypertension     Morbid obesity     Postsurgical malabsorption     Sleep apnea     USES C PAP     Past Surgical History:   Procedure Laterality Date    BREAST SURGERY      bilat lumpectomy     SECTION      twice    CHOLECYSTECTOMY      EGD      HYSTERECTOMY      KNEE ARTHROSCOPY Bilateral     MS LAP, CLARE RESTRICT PROC, LONGITUDINAL GASTRECTOMY N/A 10/21/2019    Procedure: LAPAROSCOPIC SLEEVE GASTRECTOMY AND INTRAOPERATIVE EGD;  Surgeon: Luca Fung MD;  Location: [facility];  Service: Bariatrics    WISDOM TOOTH EXTRACTION       Social History     Socioeconomic History    Marital status: /Civil [employer]     Spouse name: Not on file    Number of children: Not on file    Years of education: Not on file    Highest education level: Not on file   Occupational History    Not on file   Tobacco Use    Smoking status: Never Smoker    Smokeless tobacco: Never Used   Vaping Use    Vaping Use: Never used   Substance and Sexual Activity    Alcohol use: Not Currently     Comment: occasional    Drug use: No    Sexual activity: Yes     Partners: Male   Other Topics Concern    Not on file   Social History Narrative    Not on file     Social Determinants of Health     Financial Resource Strain:     Difficulty of Paying Living Expenses:    Food Insecurity:     Worried About Running Out of Food in the Last Year:     [address] in the Last Year:    Transportation Needs:     Lack of Transportation (Medical):      Lack of Transportation (Non-Medical):    Physical Activity:     Days of Exercise per Week:     Minutes of Exercise per Session:    Stress:     Feeling of Stress :    Social Connections:     Frequency of Communication with Friends and Family:     Frequency of Social Gatherings with Friends and Family:     Attends Jehovah's witness Services:     Active Member of Clubs or Organizations:     Attends Club or Organization Meetings:     Marital Status:    Intimate Partner Violence:     Fear of Current or Ex-Partner:     Emotionally Abused:     Physically Abused:     Sexually Abused:        Current Outpatient Medications:     acetaminophen (TYLENOL) 325 mg tablet, Take 3 tablets (975 mg total) by mouth every 8 (eight) hours, Disp: 30 tablet, Rfl: 0    albuterol (PROVENTIL HFA,VENTOLIN HFA) 90 mcg/act inhaler, Inhale 2 puffs every 4 (four) hours as needed , Disp: , Rfl:     ALPRAZolam (XANAX) 0 25 mg tablet, Take 0 25 mg by mouth daily at bedtime as needed , Disp: , Rfl:     amLODIPine (NORVASC) 10 mg tablet, Take 10 mg by mouth daily, Disp: , Rfl:     bisoprolol (ZEBETA) 10 MG tablet, Take 10 mg by mouth 2 (two) times a day, Disp: , Rfl:     chlorthalidone 25 mg tablet, , Disp: , Rfl:     diclofenac sodium (Voltaren) 1 %, Apply 2 g topically 3 (three) times a day, Disp: , Rfl:     glucose blood test strip, Check blood sugar QID, Disp: , Rfl:     lamoTRIgine (LaMICtal) 200 MG tablet, Take 200 mg by mouth daily , Disp: , Rfl:     losartan (COZAAR) 100 MG tablet, , Disp: , Rfl:     metFORMIN (GLUCOPHAGE) 500 mg tablet, Take 1 tablet (500 mg total) by mouth 2 (two) times a day with meals, Disp: 60 tablet, Rfl: 1    omeprazole (PriLOSEC) 20 mg delayed release capsule, Take 1 capsule (20 mg total) by mouth 2 (two) times a day (Patient taking differently: Take 20 mg by mouth daily ), Disp: 180 capsule, Rfl: 2    Sodium Hyaluronate (EUFLEXXA) 20 MG/2ML SOSY, knee injection every 6 months, Disp: , Rfl:     TRAMADOL HCL PO, Take 50 mg by mouth every 6 (six) hours as needed , Disp: , Rfl:     venlafaxine (EFFEXOR-XR) 37 5 mg 24 hr capsule, , Disp: , Rfl:     venlafaxine (EFFEXOR-XR) 75 mg 24 hr capsule, Take 75 mg by mouth daily , Disp: , Rfl:     Food Intake and Lifestyle Assessment   Food Intake Assessment completed via usual diet recall- remains similar but improving with less grazing     Breakfast: protein bar - 8am or protein muffin, omelet or hot chocolate( high protein low calories )    Snack: greek yogurt - 10am   Lunch: homemade pizza on tortilla 12 pm - usually a salad  Snack: pretzels, sugar free klondike bars - 3  Dinner: salad, chicken or shrimp or steak, protein and vegetabale - 6 or 7  Snack: klondike sugar free, pretzels  Beverage intake: powdered iced tea with crystal light 30 oz, diet green tea 16 x2, water 16 x2, coffee 2-3 cups coffee (1/2 and 1/2)  Diet texture/stage: regular  Protein supplement: yes, bar  Estimated protein intake per day: 65-75  Estimated fluid intake per day: 110 oz  Meals eaten away from home: once a week  Typical meal pattern: 3 meals per day and 3 snacks per day  Eating Behaviors: Frequent snacking/ grazing stress eating and eating while working  Food allergies or intolerances: no  Cultural or Anabaptism considerations: no    Physical Assessment  Nutrition Related Findings  Return of Hunger    Physical Activity  Types of exercise: stopped going to the gym due to increased work hours - plans to return with her daughter and friend several days per week Has started walking the dog again   Current physical limitations: knees    Psychosocial Assessment   Support systems: friends and daughter  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  Was working over 55 hours per week, then had an emotional "breakdown" and took one week off from work  Had medication adjusted and spoke with her boss -  Had reduced her hours back to 40-45 hours per week with a one hour lunch ( was previously not taking lunch )     Nutrition Diagnosis- continued   Diagnosis: Overweight / Obesity (NC-3 3)  Related to: Excessive energy intake and grazing/mindless eating  As Evidenced by: BMI >25 and client interview     Interventions and Teaching   Patient educated on post-op nutrition guidelines  Patient educated and handouts provided  Adequate hydration  Weight loss plateaus/ possibility of weight regain  Exercise  Nutrition considerations after surgery  Protein supplements  Dietary and lifestyle changes  Possible problems with poor eating habits  Intuitive eating  Techniques for self monitoring and keeping daily food journal    Education provided to: patient    Barriers to learning: No barriers identified    Readiness to change: monitoring and relapsing    Comprehension: verbalizes understanding     Expected Compliance: excellent    Evaluation/Monitoring   Eating pattern as discussed Body weight Physical activity Patient has returned to focusing on herself, now that she has reduced her work hours to 40-45 hours per week    She takes her lunch break and will drink a smoothie and sit out back in her pool while drinking it and relax which has reduced her stress level and helped her reduce grazing  She has set a hard stop to end working at 6 pm to allow more time for self care, and meal planning  She plans to return to previous good habits - will leave healthy foods and vitamin/mineral on counter - ie protein powder, vitamin/minerals , produce  She will keep her own shelf in the fridge  This way she will only eat from "her " foods  Reviewed her shopping list and added some more easy healthy options  Patient has a dry erase board that she will use to document her weight and exercise minutes to help keep her accountable  She also likes to tan before going to the gym - so tanning is her reward , and she feels it helps her be more consistent  Agreeable to using baritastic to keep food log       Goals  Exercise 30 minutes 5 times per week and Eliminate mindless snacking   Food log 3623-4604 calories 75-90 grams of protein   Return to gym with friend and daughter 3 days per week   Continue to walk dog  Continue to take hour lunch for stress reduction and healthy eating        Time Spent:   30 minutes

## 2021-07-02 ENCOUNTER — OFFICE VISIT (OUTPATIENT)
Dept: BARIATRICS | Facility: CLINIC | Age: 56
End: 2021-07-02

## 2021-07-02 VITALS — BODY MASS INDEX: 35.99 KG/M2 | WEIGHT: 210.8 LBS | HEIGHT: 64 IN

## 2021-07-02 DIAGNOSIS — R63.5 ABNORMAL WEIGHT GAIN: Primary | ICD-10-CM

## 2021-07-02 PROCEDURE — RECHECK

## 2021-07-02 NOTE — PROGRESS NOTES
Behavioral Health Follow Up Note:      Vertical sleeve gastrectomy  Surgery Date: October 21 2019  15 months   post-op  Surgeon: Dr Christian Green today for additional post op support with weight loss  Has been following provider with medication, but her Depression interfered with her routines and she was not taking as prescribed therefore not working  Returned to her [facility] 77 provider and medications were changed and adjusted  Has been taking new levels for one month plus and feels better  Still seeking a therapist for ongoing therapy  The mental health field is not taking new patients or her insurance does not cover  Will return to  next month for continued support    [address]

## 2021-08-04 ENCOUNTER — OFFICE VISIT (OUTPATIENT)
Dept: BARIATRICS | Facility: CLINIC | Age: 56
End: 2021-08-04

## 2021-08-04 ENCOUNTER — OFFICE VISIT (OUTPATIENT)
Dept: BARIATRICS | Facility: CLINIC | Age: 56
End: 2021-08-04
Payer: COMMERCIAL

## 2021-08-04 VITALS
SYSTOLIC BLOOD PRESSURE: 118 MMHG | WEIGHT: 216.5 LBS | DIASTOLIC BLOOD PRESSURE: 78 MMHG | HEART RATE: 56 BPM | HEIGHT: 64 IN | BODY MASS INDEX: 36.96 KG/M2 | TEMPERATURE: 97.7 F

## 2021-08-04 DIAGNOSIS — K91.2 POSTSURGICAL MALABSORPTION: Primary | ICD-10-CM

## 2021-08-04 DIAGNOSIS — E66.9 OBESITY, CLASS I, BMI 30-34.9: ICD-10-CM

## 2021-08-04 DIAGNOSIS — R73.03 PREDIABETES: ICD-10-CM

## 2021-08-04 DIAGNOSIS — Z98.84 BARIATRIC SURGERY STATUS: Primary | ICD-10-CM

## 2021-08-04 DIAGNOSIS — Z98.84 BARIATRIC SURGERY STATUS: ICD-10-CM

## 2021-08-04 PROCEDURE — 99214 OFFICE O/P EST MOD 30 MIN: CPT | Performed by: PHYSICIAN ASSISTANT

## 2021-08-04 PROCEDURE — RECHECK

## 2021-08-04 RX ORDER — VENLAFAXINE HYDROCHLORIDE 150 MG/1
CAPSULE, EXTENDED RELEASE ORAL
COMMUNITY
Start: 2021-06-03

## 2021-08-04 NOTE — PATIENT INSTRUCTIONS
The more common side effects of metformin include:  · heartburn  · stomach pain  · nausea or vomiting  · bloating  · gas  · diarrhea  · constipation  · weight loss  · headache  · unpleasant metallic taste in mouth

## 2021-08-04 NOTE — PROGRESS NOTES
Assessment/Plan:     Diagnoses and all orders for this visit:    Postsurgical malabsorption  -     Zinc; Future  -     Vitamin D 25 hydroxy; Future  -     Vitamin B12; Future  -     Vitamin B1, whole blood; Future  -     Vitamin A; Future  -     PTH, intact; Future  -     Comprehensive metabolic panel; Future  -     CBC and Platelet; Future  -     Ferritin; Future  -     Folate; Future  -     Iron Saturation %; Future    Obesity, Class I, BMI 30-34 9  -     metFORMIN (GLUCOPHAGE) 500 mg tablet; Take 1 tablet (500 mg total) by mouth 2 (two) times a day with meals  -     Zinc; Future  -     Vitamin D 25 hydroxy; Future  -     Vitamin B12; Future  -     Vitamin B1, whole blood; Future  -     Vitamin A; Future  -     PTH, intact; Future  -     Comprehensive metabolic panel; Future  -     CBC and Platelet; Future  -     Ferritin; Future  -     Folate; Future  -     Iron Saturation %; Future    Prediabetes  -     metFORMIN (GLUCOPHAGE) 500 mg tablet; Take 1 tablet (500 mg total) by mouth 2 (two) times a day with meals  -     Zinc; Future  -     Vitamin D 25 hydroxy; Future  -     Vitamin B12; Future  -     Vitamin B1, whole blood; Future  -     Vitamin A; Future  -     PTH, intact; Future  -     Comprehensive metabolic panel; Future  -     CBC and Platelet; Future  -     Ferritin; Future  -     Folate; Future  -     Iron Saturation %; Future    Bariatric surgery status  -     metFORMIN (GLUCOPHAGE) 500 mg tablet; Take 1 tablet (500 mg total) by mouth 2 (two) times a day with meals  -     Zinc; Future  -     Vitamin D 25 hydroxy; Future  -     Vitamin B12; Future  -     Vitamin B1, whole blood; Future  -     Vitamin A; Future  -     PTH, intact; Future  -     Comprehensive metabolic panel; Future  -     CBC and Platelet; Future  -     Ferritin; Future  -     Folate; Future  -     Iron Saturation %;  Future    Other orders  -     venlafaxine (EFFEXOR-XR) 150 mg 24 hr capsule          s/p Vertical Horris Block   Estefania on 10/21/2019  Has been following up regarding her weight regain, was previously trialed on phentermine which was not very effective with regards to her weight loss  Also tried topamax in the past but had bad side effects to this medication  Since her last visit, has gained 8 lbs but overall her mental health is much improved and she is feeling better  Her psychiatrist adjust her medications (increased effexor) and she also started following with a new therapist which has helped her immensely  She is seeing out RD and SW to help make adjustments to her diet and overall lifestyle  She is  Working on adjusting her diet to make and choose healthier choices everyday  She is also working to incorporate more exercise  At last visit decided to hold off on metformin in effort to first make lifestyle changes but now would like to retry this medication  Will see patient back in 2 months for med follow up/annual  Will order her blood work to have done before this visit so her kidney function is also checked while on metformin  Subjective:      Patient ID: Shireen Mullins is a 50 y o  female      HPI follow up postop weight gain     The following portions of the patient's history were reviewed and updated as appropriate: allergies, current medications, past family history, past medical history, past social history, past surgical history and problem list     Review of Systems       Objective:      /78 (BP Location: Right arm, Patient Position: Sitting)   Pulse 56   Temp 97 7 °F (36 5 °C) (Tympanic)   Ht 5' 4" (1 626 m)   Wt 98 2 kg (216 lb 8 oz)   BMI 37 16 kg/m²          Physical Exam

## 2021-09-17 ENCOUNTER — OFFICE VISIT (OUTPATIENT)
Dept: SLEEP CENTER | Facility: CLINIC | Age: 56
End: 2021-09-17
Payer: COMMERCIAL

## 2021-09-17 VITALS
WEIGHT: 222.2 LBS | HEIGHT: 64 IN | SYSTOLIC BLOOD PRESSURE: 116 MMHG | DIASTOLIC BLOOD PRESSURE: 72 MMHG | BODY MASS INDEX: 37.94 KG/M2

## 2021-09-17 DIAGNOSIS — G47.33 OBSTRUCTIVE SLEEP APNEA TREATED WITH CONTINUOUS POSITIVE AIRWAY PRESSURE (CPAP): Primary | ICD-10-CM

## 2021-09-17 DIAGNOSIS — Z99.89 OBSTRUCTIVE SLEEP APNEA TREATED WITH CONTINUOUS POSITIVE AIRWAY PRESSURE (CPAP): Primary | ICD-10-CM

## 2021-09-17 PROCEDURE — 99214 OFFICE O/P EST MOD 30 MIN: CPT | Performed by: NURSE PRACTITIONER

## 2021-09-17 NOTE — PATIENT INSTRUCTIONS
1   Continue use of CPAP equipment nightly  2  Continue to clean your equipment, as discussed  3  Contact the Sleep [address] [place] [address] with any questions or concerns prior to your next visit, as needed  4  Schedule visit for follow-up in 1 year    If you use melatonin 5-10mg take it 9-10 hours before your wake up time  Continuous Positive Airway Pressure (CPAP) therapy was prescribed to you as a medical necessity and there are risks of discontinuing  use of the device, some of which may be long term  Symptoms you experienced before using CPAP may return such as snoring, apneas, excessive daytime sleepiness, hypertension, cardiac arrhythmias, risk of stroke, congestive heart-failure, exacerbation of COPD and potential respiratory failure  Ultimately, it is a personal decision for you to make if you continue use of an affected device or discontinue until a replacement is provided  Unfortunately, [employer] has not yet provided us with information about available devices  Registering your device will allow you to receive up to date information regarding the recall  You can visit their website at [email or web address]  [email or web address]-update to register your device and to learn more about how [employer] plans to replace your device  OR  You can call them to register your device and ask any questions at:  [phone]    It is advised that you do not use any type of ozone  for your PAP equipment  Nursing Support:  When: Monday through Friday 7A-5PM except holidays  Where: Our direct line is [phone]  If you are having a true emergency please call 911  In the event that the line is busy or it is after hours please leave a voice message and we will return your call  Please speak clearly, leaving your full name, birth date, best number to reach you and the reason for your call  Medication refills:  We will need the name of the medication, the dosage, the ordering provider, whether you get a 30 or 90 day refill, and the pharmacy name and address  Medications will be ordered by the provider only  Nurses cannot call in prescriptions  Please allow 7 days for medication refills  Physician requested updates: If your provider requested that you call with an update after starting medication, please be ready to provide us the medication and dosage, what time you take your medication, the time you attempt to fall asleep, time you fall asleep, when you wake up, and what time you get out of bed  Sleep Study Results: We will contact you with sleep study results and/or next steps after the physician has reviewed your testing

## 2021-09-17 NOTE — PROGRESS NOTES
Progress Note - [address] [facility] 50 y o  female   :1965, MRN: [id number]  2021          Follow Up Evaluation / Problem:  Severe Obstructive Sleep Apnea  S/P gastric sleeve surgery  Obesity      Thank you for the opportunity of participating in the evaluation and care of this patient in the Sleep Clinic at [facility]  HPI: Osiris Middleton is a 50y o  year old female  The patient presents for follow up of severe obstructive sleep apnea  She completed an overnight diagnostic polysomnogram in 2018, which identified severe obstructive sleep apnea  She had symptoms of excessive daytime sleepiness, frequent night time awakenings and loud snoring  She began the use of AutoPAP in 2018  She initially had some difficulty tolerating a low pressure of 5cm  Pressure was increased to 7cm to 14cm  She then began to have difficulty with removing the mask and turning off the machine in her sleep  Pressure range was changed to 7-12cm  She is here to review annual compliance and effectiveness of treatment      Review of Systems      Genitourinary none   Cardiology ankle/leg swelling   Gastrointestinal frequent heartburn/acid reflux   Neurology numbness/tingling of an extremity, forgetfulness, poor concentration or confusion,  and difficulty with memory   Constitutional fatigue and weight change   Integumentary none   Psychiatry depression   Musculoskeletal joint pain   Pulmonary none   ENT none   Endocrine none   Hematological none       Current Outpatient Medications:     acetaminophen (TYLENOL) 325 mg tablet, Take 3 tablets (975 mg total) by mouth every 8 (eight) hours, Disp: 30 tablet, Rfl: 0    amLODIPine (NORVASC) 10 mg tablet, Take 10 mg by mouth daily, Disp: , Rfl:     bisoprolol (ZEBETA) 10 MG tablet, Take 10 mg by mouth 2 (two) times a day, Disp: , Rfl:     diclofenac sodium (Voltaren) 1 %, Apply 2 g topically 3 (three) times a day, Disp: , Rfl:     lamoTRIgine (LaMICtal) 200 MG tablet, Take 200 mg by mouth daily , Disp: , Rfl:     losartan (COZAAR) 100 MG tablet, , Disp: , Rfl:     metFORMIN (GLUCOPHAGE) 500 mg tablet, Take 1 tablet (500 mg total) by mouth 2 (two) times a day with meals, Disp: 60 tablet, Rfl: 1    omeprazole (PriLOSEC) 20 mg delayed release capsule, Take 1 capsule (20 mg total) by mouth 2 (two) times a day (Patient taking differently: Take 20 mg by mouth daily ), Disp: 180 capsule, Rfl: 2    Sodium Hyaluronate (EUFLEXXA) 20 MG/2ML SOSY, knee injection every 6 months, Disp: , Rfl:     venlafaxine (EFFEXOR-XR) 150 mg 24 hr capsule, , Disp: , Rfl:     albuterol (PROVENTIL HFA,VENTOLIN HFA) 90 mcg/act inhaler, Inhale 2 puffs every 4 (four) hours as needed  (Patient not taking: Reported on 9/17/2021), Disp: , Rfl:     ALPRAZolam (XANAX) 0 25 mg tablet, Take 0 25 mg by mouth daily at bedtime as needed  (Patient not taking: Reported on 9/17/2021), Disp: , Rfl:     chlorthalidone 25 mg tablet, , Disp: , Rfl:     glucose blood test strip, Check blood sugar QID (Patient not taking: Reported on 9/17/2021), Disp: , Rfl:     TRAMADOL HCL PO, Take 50 mg by mouth every 6 (six) hours as needed  (Patient not taking: Reported on 9/17/2021), Disp: , Rfl:     venlafaxine (EFFEXOR-XR) 37 5 mg 24 hr capsule, , Disp: , Rfl:     venlafaxine (EFFEXOR-XR) 75 mg 24 hr capsule, Take 75 mg by mouth daily  (Patient not taking: Reported on 8/4/2021), Disp: , Rfl:     [place] Sleepiness Scale  Sitting and reading: Would never doze  Watching TV: Slight chance of dozing  Sitting, inactive in a public place (e g  a theatre or a meeting): Slight chance of dozing  As a passenger in a car for an hour without a break: Slight chance of dozing  Lying down to rest in the afternoon when circumstances permit: High chance of dozing  Sitting and talking to someone: Would never doze  Sitting quietly after a lunch without alcohol: Would never doze  In a car, while stopped for a few minutes in traffic: Would never doze  Total score: 6              Vitals:    09/17/21 0950   BP: 116/72   Weight: 101 kg (222 lb 3 2 oz)   Height: 5' 4" (1 626 m)       Body mass index is 38 14 kg/m²  EPWORTH SLEEPINESS SCORE  Total score: 6      Past History Since Last Sleep Center Visit:   She initially lost 120 lbs after having bariatric surgery in October 2019  She has regained weight and began using topamax and phentermine  She reports that this combination of medication caused her to have erratic sleep  She didn't realize it until she stopped using the medication  She has been working on getting her sleep schedule back to normal  She is also woken from sleep by pain and paresthesias in her wrists from carpal tunnel  She was not using the CPAP equipment for a period of time, but has resumed use  The current settings are comfortable for her, as well as the nasal pillow mask  She feels sleep quality is much better when using CPAP equipment  The patient's CPAP equipment has been recalled  The patient has never used an ozone  to clean the equipment  The patient has not had any symptoms consistent with those associated with the recall  The review of systems and following portions of the patient's history were reviewed and updated as appropriate: allergies, current medications, past family history, past medical history, past social history, past surgical history, and problem list         OBJECTIVE  Set up date:  12/27/18  PAP Pressure: APAP 5-10cm  Mask type:  Nasal pillows  DME Provider:  [facility] [employer]    Physical Exam:     General Appearance:   Alert, cooperative, no distress, appears stated age, obese     Head:   Normocephalic, without obvious abnormality, atraumatic     Eyes:   PERRL, conjunctiva/corneas clear, EOM's intact          Nose:  Nares normal, septum slightly deviated, no drainage or sinus tenderness           Throat:  Lips, teeth and gums normal; tongue normal size and  shape and midline, mucosa moist with redundancy and low-lying soft palatal tissue, uvula only partially visualized, tonsils not visualized, Mallampati score 4      Neck:  Supple, symmetrical, trachea midline, no adenopathy; Thyroid: No enlargement, tenderness or nodules; no carotid bruit or JVD     Lungs:      Clear to auscultation bilaterally, respirations unlabored     Heart:   Regular rate and rhythm, S1 and S2 normal, no murmur, rub or gallop       Extremities:  Extremities normal, atraumatic, no cyanosis or edema       Skin:  Skin color, texture, turgor normal, no rashes or lesions       Neurologic:  No focal deficits noted       ASSESSMENT / PLAN    1  Obstructive sleep apnea treated with continuous positive airway pressure (CPAP)  PAP DME Pressure Change    PAP DME Resupply/Reorder           Counseling / Coordination of Care  Total clinic time spent today 30 minutes  Greater than 50% of total time was spent with the patient and / or family counseling and / or coordination of care  A description of the counseling / coordination of care:     Impressions, Diagnostic results, Prognosis, Instructions for management, Risks and benefits of treatment, Patient and family education, Risk factor reductions and Importance of compliance with treatment    Today I reviewed the patient's compliance data  she has been able to use the equipment 50% of all days recorded  Average usage was 4 or more hours 40% of all days recorded  The estimated AHI is 9 7 abnormal breathing events per hour  A detailed report was requested due to elevated AHI - average indices  CA 1 6, OA 3 4  It was again recommended that she begin to use a chin strap when wearing her CPAP mask, which will help her to keep her mouth closed and reduce hypopneas and APAP increasing pressure  Response to treatment has been very good    She was encouraged to use her equipment more regularly  A prescription for supplies has been provided for the next year  We discussed the recent recall of the patient's PAP equipment  The risks and benefits for continued use vs  Discontinuation of PAP therapy were discussed  It is ultimately the patient's decision whether or not to continue PAP therapy at this time  The patient was advised to register their equipment on the [employer], which will give them further direction in the future replacement of the equipment  Since the ozone cleaning devices have been suspected as a causative agent in the recall, the patient has been advised to avoid using any ozone cleaning device and clean the equipment using mild soap and water  She plans to continue using this equipment at the settings noted above for the next 12 months  At that timeshe will then return for a routine follow-up evaluation  I have asked the patient to contact the [address] if she encounters any difficulties prior to that time  The following instructions have been given to the patient today:    Patient Instructions   1  Continue use of CPAP equipment nightly  2  Continue to clean your equipment, as discussed  3  Contact the Sleep [address] [place] [address] with any questions or concerns prior to your next visit, as needed  4  Schedule visit for follow-up in 1 year    If you use melatonin 5-10mg take it 9-10 hours before your wake up time  Continuous Positive Airway Pressure (CPAP) therapy was prescribed to you as a medical necessity and there are risks of discontinuing  use of the device, some of which may be long term  Symptoms you experienced before using CPAP may return such as snoring, apneas, excessive daytime sleepiness, hypertension, cardiac arrhythmias, risk of stroke, congestive heart-failure, exacerbation of COPD and potential respiratory failure   Ultimately, it is a personal decision for you to make if you continue use of an affected device or discontinue until a replacement is provided  Unfortunately, [employer] has not yet provided us with information about available devices  Registering your device will allow you to receive up to date information regarding the recall  You can visit their website at www  [email or web address] to register your device and to learn more about how [employer] plans to replace your device  OR  You can call them to register your device and ask any questions at:  [phone]    It is advised that you do not use any type of ozone  for your PAP equipment  Nursing Support:  When: Monday through Friday 7A-5PM except holidays  Where: Our direct line is [phone]  If you are having a true emergency please call 911  In the event that the line is busy or it is after hours please leave a voice message and we will return your call  Please speak clearly, leaving your full name, birth date, best number to reach you and the reason for your call  Medication refills: We will need the name of the medication, the dosage, the ordering provider, whether you get a 30 or 90 day refill, and the pharmacy name and address  Medications will be ordered by the provider only  Nurses cannot call in prescriptions  Please allow 7 days for medication refills  Physician requested updates: If your provider requested that you call with an update after starting medication, please be ready to provide us the medication and dosage, what time you take your medication, the time you attempt to fall asleep, time you fall asleep, when you wake up, and what time you get out of bed  Sleep Study Results: We will contact you with sleep study results and/or next steps after the physician has reviewed your testing          Keshawn Ray, [address] [place] [address]

## 2021-09-20 ENCOUNTER — TELEPHONE (OUTPATIENT)
Dept: SLEEP CENTER | Facility: CLINIC | Age: 56
End: 2021-09-20

## 2021-09-23 ENCOUNTER — TELEPHONE (OUTPATIENT)
Dept: SLEEP CENTER | Facility: CLINIC | Age: 56
End: 2021-09-23

## 2021-09-23 DIAGNOSIS — R73.03 PREDIABETES: ICD-10-CM

## 2021-09-23 DIAGNOSIS — Z98.84 BARIATRIC SURGERY STATUS: ICD-10-CM

## 2021-09-23 DIAGNOSIS — E66.9 OBESITY, CLASS I, BMI 30-34.9: ICD-10-CM

## 2021-10-15 ENCOUNTER — OFFICE VISIT (OUTPATIENT)
Dept: BARIATRICS | Facility: CLINIC | Age: 56
End: 2021-10-15
Payer: COMMERCIAL

## 2021-10-15 VITALS
SYSTOLIC BLOOD PRESSURE: 132 MMHG | WEIGHT: 225 LBS | TEMPERATURE: 98.3 F | HEART RATE: 51 BPM | BODY MASS INDEX: 38.41 KG/M2 | DIASTOLIC BLOOD PRESSURE: 70 MMHG | HEIGHT: 64 IN

## 2021-10-15 DIAGNOSIS — K91.2 POSTSURGICAL MALABSORPTION: ICD-10-CM

## 2021-10-15 DIAGNOSIS — R73.09 ELEVATED GLUCOSE: ICD-10-CM

## 2021-10-15 DIAGNOSIS — I10 ESSENTIAL HYPERTENSION: ICD-10-CM

## 2021-10-15 DIAGNOSIS — Z48.815 ENCOUNTER FOR SURGICAL AFTERCARE FOLLOWING SURGERY OF DIGESTIVE SYSTEM: Primary | ICD-10-CM

## 2021-10-15 DIAGNOSIS — Z98.890 STATUS POST DIGESTIVE SYSTEM SURGERY: ICD-10-CM

## 2021-10-15 DIAGNOSIS — Z98.84 BARIATRIC SURGERY STATUS: ICD-10-CM

## 2021-10-15 DIAGNOSIS — F31.9 BIPOLAR AFFECTIVE DISORDER, REMISSION STATUS UNSPECIFIED (HCC): ICD-10-CM

## 2021-10-15 DIAGNOSIS — K21.9 GASTROESOPHAGEAL REFLUX DISEASE, UNSPECIFIED WHETHER ESOPHAGITIS PRESENT: ICD-10-CM

## 2021-10-15 DIAGNOSIS — R12 HEART BURN: ICD-10-CM

## 2021-10-15 PROCEDURE — 99214 OFFICE O/P EST MOD 30 MIN: CPT | Performed by: PHYSICIAN ASSISTANT

## 2021-10-15 RX ORDER — OMEPRAZOLE 20 MG/1
20 CAPSULE, DELAYED RELEASE ORAL 2 TIMES DAILY
Qty: 180 CAPSULE | Refills: 2 | Status: SHIPPED | OUTPATIENT
Start: 2021-10-15

## 2021-11-19 DIAGNOSIS — E66.9 OBESITY, CLASS I, BMI 30-34.9: ICD-10-CM

## 2021-11-19 DIAGNOSIS — R73.03 PREDIABETES: ICD-10-CM

## 2021-11-19 DIAGNOSIS — Z98.84 BARIATRIC SURGERY STATUS: ICD-10-CM

## 2021-12-17 ENCOUNTER — NURSE TRIAGE (OUTPATIENT)
Dept: OTHER | Facility: OTHER | Age: 56
End: 2021-12-17

## 2021-12-17 DIAGNOSIS — Z20.822 SUSPECTED SEVERE ACUTE RESPIRATORY SYNDROME CORONAVIRUS 2 (SARS-COV-2) INFECTION: Primary | ICD-10-CM

## 2021-12-17 PROCEDURE — 87636 SARSCOV2 & INF A&B AMP PRB: CPT | Performed by: FAMILY MEDICINE

## 2021-12-19 LAB
FLUAV RNA RESP QL NAA+PROBE: NEGATIVE
FLUBV RNA RESP QL NAA+PROBE: NEGATIVE
SARS-COV-2 RNA RESP QL NAA+PROBE: NEGATIVE

## 2021-12-22 ENCOUNTER — TELEPHONE (OUTPATIENT)
Dept: BARIATRICS | Facility: CLINIC | Age: 56
End: 2021-12-22

## 2021-12-22 DIAGNOSIS — R12 HEART BURN: ICD-10-CM

## 2021-12-22 DIAGNOSIS — Z98.890 STATUS POST DIGESTIVE SYSTEM SURGERY: ICD-10-CM

## 2022-01-19 ENCOUNTER — NURSE TRIAGE (OUTPATIENT)
Dept: OTHER | Facility: OTHER | Age: 57
End: 2022-01-19

## 2022-01-19 DIAGNOSIS — Z20.822 ENCOUNTER FOR SCREENING LABORATORY TESTING FOR COVID-19 VIRUS: Primary | ICD-10-CM

## 2022-01-19 PROCEDURE — U0003 INFECTIOUS AGENT DETECTION BY NUCLEIC ACID (DNA OR RNA); SEVERE ACUTE RESPIRATORY SYNDROME CORONAVIRUS 2 (SARS-COV-2) (CORONAVIRUS DISEASE [COVID-19]), AMPLIFIED PROBE TECHNIQUE, MAKING USE OF HIGH THROUGHPUT TECHNOLOGIES AS DESCRIBED BY CMS-2020-01-R: HCPCS | Performed by: FAMILY MEDICINE

## 2022-01-19 PROCEDURE — U0005 INFEC AGEN DETEC AMPLI PROBE: HCPCS | Performed by: FAMILY MEDICINE

## 2022-01-19 NOTE — TELEPHONE ENCOUNTER
Update insurance coverage    Patient is requesting a covid test and does not have a [facility]'s PCP  Reports having an out of network PCP  Order placed  Patient informed of [address] [facility] testing site and was advised of hours of operation, address, to wear a mask, and to stay in the car  Patient verbalized understanding  Patient already has a My Chart account to check for results  Advised patient to begin checking in 2-3 days after testing is completed  Reason for Disposition   [1] COVID-19 infection suspected by caller or triager AND [2] mild symptoms (cough, fever, or others) AND [3] has not gotten tested yet    Answer Assessment - Initial Assessment Questions  Were you within 6 feet or less, for up to 15 minutes or more with a person that has a confirmed COVID-19 test? Yes, patient's son    What was the date of your exposure? 1/14/22 tested positive    Are you experiencing any symptoms attributed to the virus?  (Assess for SOB, cough, fever, difficulty breathing) barky cough, body aches, headache, fever (101 7 ear), runny nose and post nasal drip, fatigue, loss of appetite    HIGH RISK: Do you have any history heart or lung conditions, weakened immune system, diabetes, Asthma, CHF, HIV, COPD, Chemo, renal failure, sickle cell, etc? Asthma with albuterol PRN; mild shortness of breath     PREGNANCY: Are you pregnant or did you recently give birth?  Denies    VACCINE: "Have you gotten the COVID-19 vaccine?" If Yes ask: "Which one, how many shots, when did you get it?" Yes, Moderna x 2 shots    Protocols used: CORONAVIRUS (COVID-19) DIAGNOSED OR SUSPECTED-ADULT-OH

## 2022-01-19 NOTE — TELEPHONE ENCOUNTER
Regarding: Covid Symptomaitc Body Aches  ----- Message from [facility] sent at 1/19/2022 11:01 AM EST -----  "My son tested positive for covid  Sunday I started not feeling well  I have a barky cough, body aches, headache and fever 101 7 (ear)  "

## 2022-06-15 ENCOUNTER — OFFICE VISIT (OUTPATIENT)
Dept: BARIATRICS | Facility: CLINIC | Age: 57
End: 2022-06-15
Payer: COMMERCIAL

## 2022-06-15 VITALS
BODY MASS INDEX: 39.18 KG/M2 | DIASTOLIC BLOOD PRESSURE: 78 MMHG | HEIGHT: 64 IN | TEMPERATURE: 97.2 F | SYSTOLIC BLOOD PRESSURE: 120 MMHG | HEART RATE: 57 BPM | WEIGHT: 229.5 LBS

## 2022-06-15 DIAGNOSIS — K91.2 POSTSURGICAL MALABSORPTION: ICD-10-CM

## 2022-06-15 DIAGNOSIS — Z98.84 BARIATRIC SURGERY STATUS: ICD-10-CM

## 2022-06-15 DIAGNOSIS — E66.9 OBESITY, CLASS II, BMI 35-39.9: ICD-10-CM

## 2022-06-15 DIAGNOSIS — Z48.815 ENCOUNTER FOR SURGICAL AFTERCARE FOLLOWING SURGERY OF DIGESTIVE SYSTEM: Primary | ICD-10-CM

## 2022-06-15 DIAGNOSIS — I10 ESSENTIAL HYPERTENSION: ICD-10-CM

## 2022-06-15 PROCEDURE — 99214 OFFICE O/P EST MOD 30 MIN: CPT | Performed by: PHYSICIAN ASSISTANT

## 2022-06-15 RX ORDER — BUPROPION HYDROCHLORIDE 300 MG/1
TABLET ORAL
COMMUNITY
Start: 2022-05-11

## 2022-06-15 RX ORDER — TRIAMCINOLONE ACETONIDE 40 MG/ML
2 INJECTION, SUSPENSION INTRA-ARTICULAR; INTRAMUSCULAR
COMMUNITY

## 2022-06-15 NOTE — PROGRESS NOTES
Assessment/Plan:     Patient ID: Ramon Norman is a 49 y o  female  Bariatric Surgery Status       s/p Vertical [facility] Rm 10/21/2019  Here for annual  Has been following up regarding her weight regain, was previously trialed on phentermine which was not very effective with regards to her weight loss  Also tried topamax in the past but had bad side effects to this medication  Previously trialed metformin as well  She reports some weight gain since her last visit which she attributes to her depression  She feels lack of motivation to control her diet or exercise and would rather stay in bed most days  She just started on wellbutrin 300 mg per her pcp  She also follows routinely with her therapist  She is interested in RD/SW follow up, and then return to [facility] provider to discuss medication  She still has some reflux but overall improved since last visit after she cut out certain drinks (crystal lite, iced tea)  She is taking PPI only once daily  She is due for screening EGD this year, will schedule  Last EGD was in 2020 and showed:    IMPRESSION:  LA grade A esophagitis  Gastritis  Moderate amount of bile refluxing into the body of the stomach  Multiple benign-looking gastric polyps       HTN  - continue antihypertensives     · Continued/Maintain healthy weight loss with good nutrition intakes  · Adequate hydration with at least 64oz  fluid intake  · Follow diet as discussed  · Follow vitamin and mineral recommendations as reviewed with you  · Exercise as tolerated  · Colonoscopy referral made: has cologuard test at home to complete   · Mammogram: utd     · Follow-up with dietician and  and with M provider; annual in 1 year  We kindly ask that your arrive 15 minutes before your scheduled appointment time with your provider to allow our staff to room you, get your vital signs and update your chart  · Get lab work done   Please call the office if you need a script  It is recommended to check with your insurance BEFORE getting labs done to make sure they are covered by your policy  · Call our office if you have any problems with abdominal pain especially associated with fever, chills, nausea, vomiting or any other concerns  · All  Post-bariatric surgery patients should be aware that very small quantities of any alcohol can cause impairment and it is very possible not to feel the effect  The effect can be in the system for several hours  It is also a stomach irritant  · It is advised to AVOID alcohol, Nonsteroidal antiinflammatory drugs (NSAIDS) and nicotine of all forms   Any of these can cause stomach irritation/pain  · Discussed the effects of alcohol on a bariatric patient and the increased impairment risk  · Keep up the good work! Postsurgical Malabsorption   -At risk for malabsorption of vitamins/minerals secondary to malabsorption and restriction of intake from bariatric surgery  -Currently taking adequate postop bariatric surgery vitamin supplementation  -Last set of bariatric labs completed 10/2021 and WNL   -Next set of bariatric labs ordered for approximately 6 months  -Patient received education about the importance of adhering to a lifelong supplementation regimen to avoid vitamin/mineral deficiencies      Diagnoses and all orders for this visit:    Encounter for surgical aftercare following surgery of digestive system  -     Zinc; Future  -     Vitamin D 25 hydroxy; Future  -     Vitamin B12; Future  -     Vitamin A; Future  -     Vitamin B1, whole blood; Future  -     PTH, intact; Future  -     CBC and Platelet; Future  -     Comprehensive metabolic panel; Future  -     Ferritin; Future  -     Folate; Future  -     Iron Saturation %; Future    Bariatric surgery status  -     Zinc; Future  -     Vitamin D 25 hydroxy; Future  -     Vitamin B12; Future  -     Vitamin A; Future  -     Vitamin B1, whole blood;  Future  -     PTH, intact; Future  -     CBC and Platelet; Future  -     Comprehensive metabolic panel; Future  -     Ferritin; Future  -     Folate; Future  -     Iron Saturation %; Future    Postsurgical malabsorption  -     Zinc; Future  -     Vitamin D 25 hydroxy; Future  -     Vitamin B12; Future  -     Vitamin A; Future  -     Vitamin B1, whole blood; Future  -     PTH, intact; Future  -     CBC and Platelet; Future  -     Comprehensive metabolic panel; Future  -     Ferritin; Future  -     Folate; Future  -     Iron Saturation %; Future    Obesity, Class II, BMI 35-39 9  -     Zinc; Future  -     Vitamin D 25 hydroxy; Future  -     Vitamin B12; Future  -     Vitamin A; Future  -     Vitamin B1, whole blood; Future  -     PTH, intact; Future  -     CBC and Platelet; Future  -     Comprehensive metabolic panel; Future  -     Ferritin; Future  -     Folate; Future  -     Iron Saturation %; Future    Essential hypertension  -     Zinc; Future  -     Vitamin D 25 hydroxy; Future  -     Vitamin B12; Future  -     Vitamin A; Future  -     Vitamin B1, whole blood; Future  -     PTH, intact; Future  -     CBC and Platelet; Future  -     Comprehensive metabolic panel; Future  -     Ferritin; Future  -     Folate; Future  -     Iron Saturation %; Future    Other orders  -     buPROPion (WELLBUTRIN XL) 300 mg 24 hr tablet  -     triamcinolone acetonide (KENALOG-40) 40 mg/mL; 2 mL         Subjective:      Patient ID: Eliseo Bella is a 49 y o  female  [facility]  Here for annual     Current: 229  EWL: (Weight loss is ahead of schedule at this post surgical period )  Current BMI is Body mass index is 39 39 kg/m²      · Tolerating a regular diet-yes  · Eating at least 60 grams of protein per day-yes  · Following 30/60 minute rule with liquids- not always   · Drinking at least 64 ounces of fluid per day-yes  · Drinking carbonated beverages-no  · Sufficient exercise-no  · Using nicotine-no  · Using alcohol-no  · Supplements: francis advnatgae mvi + calcium chews    EWL is 26%    The following portions of the patient's history were reviewed and updated as appropriate: allergies, current medications, past family history, past medical history, past social history, past surgical history and problem list     Review of Systems   Constitutional: Negative  Eyes: Negative for visual disturbance  Respiratory: Negative  Cardiovascular: Negative  Gastrointestinal: Negative  Musculoskeletal: Positive for arthralgias (baseline knee pain (arthritis))  Skin: Negative  Psychiatric/Behavioral: Positive for dysphoric mood  Objective:    /78 (BP Location: Left arm, Patient Position: Sitting, Cuff Size: Standard)   Pulse 57   Temp (!) 97 2 °F (36 2 °C) (Tympanic)   Ht 5' 4" (1 626 m)   Wt 104 kg (229 lb 8 oz)   BMI 39 39 kg/m²      Physical Exam  Vitals and nursing note reviewed  Constitutional:       Appearance: Normal appearance  She is obese  HENT:      Head: Normocephalic and atraumatic  Eyes:      Extraocular Movements: Extraocular movements intact  Pupils: Pupils are equal, round, and reactive to light  Cardiovascular:      Rate and Rhythm: Normal rate and regular rhythm  Pulmonary:      Effort: Pulmonary effort is normal       Breath sounds: Normal breath sounds  Abdominal:      General: Bowel sounds are normal       Tenderness: There is no abdominal tenderness  Musculoskeletal:         General: Normal range of motion  Cervical back: Normal range of motion  Skin:     General: Skin is warm and dry  Neurological:      General: No focal deficit present  Mental Status: She is alert and oriented to person, place, and time     Psychiatric:         Mood and Affect: Mood normal

## 2022-06-15 NOTE — PATIENT INSTRUCTIONS
Follow-up with dietician and  and with [facility] provider; annual in 1 year  We kindly ask that your arrive 15 minutes before your scheduled appointment time with your provider to allow our staff to room you, get your vital signs and update your chart  Get lab work done  Please call the office if you need a script  It is recommended to check with your insurance BEFORE getting labs done to make sure they are covered by your policy  Call our office if you have any problems with abdominal pain especially associated with fever, chills, nausea, vomiting or any other concerns  All  Post-bariatric surgery patients should be aware that very small quantities of any alcohol can cause impairment and it is very possible not to feel the effect  The effect can be in the system for several hours  It is also a stomach irritant  It is advised to AVOID alcohol, Nonsteroidal antiinflammatory drugs (NSAIDS) and nicotine of all forms   Any of these can cause stomach irritation/pain  Discussed the effects of alcohol on a bariatric patient and the increased impairment risk  Keep up the good work!

## 2022-06-29 NOTE — PROGRESS NOTES
Lamar Rueda 10/21/2019  · Follow-up with dietician and  and with [facility] provider    Interested in returning to [facility] for possible medication to assist with weight loss  Getting back to a healthy lifestyle around food  In therapy with Teresa Martinez  Feels she is self sabotaging her weight loss efforts  Discussed breaking the behavior chain in order to have different outcomes  Suggested some tools to try to implement into her routine  Still working from home and feels she is snacking more at her desk  Identified that she allows herself to snack because it justifies giving herself a break  Some food triggers are boredom and loneliness  Suggested changing where she puts her food (plate cabinet over food pantry)     Scheduled for [facility] for possible medication option

## 2022-07-01 ENCOUNTER — OFFICE VISIT (OUTPATIENT)
Dept: BARIATRICS | Facility: CLINIC | Age: 57
End: 2022-07-01

## 2022-07-01 VITALS — WEIGHT: 226.6 LBS | BODY MASS INDEX: 38.9 KG/M2

## 2022-07-01 DIAGNOSIS — Z98.84 BARIATRIC SURGERY STATUS: Primary | ICD-10-CM

## 2022-07-01 PROCEDURE — RECHECK

## 2022-08-08 ENCOUNTER — OFFICE VISIT (OUTPATIENT)
Dept: BARIATRICS | Facility: CLINIC | Age: 57
End: 2022-08-08
Payer: COMMERCIAL

## 2022-08-08 VITALS
HEIGHT: 64 IN | HEART RATE: 68 BPM | WEIGHT: 228.2 LBS | SYSTOLIC BLOOD PRESSURE: 140 MMHG | TEMPERATURE: 97.6 F | BODY MASS INDEX: 38.96 KG/M2 | DIASTOLIC BLOOD PRESSURE: 78 MMHG | OXYGEN SATURATION: 97 %

## 2022-08-08 DIAGNOSIS — Z98.84 BARIATRIC SURGERY STATUS: ICD-10-CM

## 2022-08-08 DIAGNOSIS — E66.9 OBESITY, CLASS II, BMI 35-39.9: Primary | ICD-10-CM

## 2022-08-08 DIAGNOSIS — Z99.89 OBSTRUCTIVE SLEEP APNEA TREATED WITH CONTINUOUS POSITIVE AIRWAY PRESSURE (CPAP): ICD-10-CM

## 2022-08-08 DIAGNOSIS — G47.33 OBSTRUCTIVE SLEEP APNEA TREATED WITH CONTINUOUS POSITIVE AIRWAY PRESSURE (CPAP): ICD-10-CM

## 2022-08-08 DIAGNOSIS — E78.2 MIXED HYPERLIPIDEMIA: ICD-10-CM

## 2022-08-08 DIAGNOSIS — K91.2 POSTSURGICAL MALABSORPTION: ICD-10-CM

## 2022-08-08 DIAGNOSIS — F32.A DEPRESSION, UNSPECIFIED DEPRESSION TYPE: ICD-10-CM

## 2022-08-08 PROCEDURE — 99203 OFFICE O/P NEW LOW 30 MIN: CPT | Performed by: PHYSICIAN ASSISTANT

## 2022-08-08 NOTE — PROGRESS NOTES
- s/p Vertical Perfecto Pruitt 10/21/2019  was previously trialed on phentermine which was not very effective with regards to her weight loss  Also tried topamax in the past but had bad side effects to this medication  Previously trialed metformin as well  - Discussed options of HealthyCORE-Intensive Lifestyle Intervention Program, Very Low Calorie Diet-VLCD and Conservative Program and the role of weight loss medications   -Initial weight loss goal of 5-10% weight loss for improved health  She has overall maintained her weight since her annual visit in June   -Screening labs ordered   - she is scheduled for her screening EGD and follow up with Dr Sam Weldon this fall  - she is exercising a couple times a week - trying to get back into the gym  - she is working on adjusting her diet   - she started on wellbturin per PCP in May, overall doing well on 150 mg and states it has controlled her cravings  Also states her mood has improved  Plan to possibly increase dose moving forward  - 3126-2667 calories per day - sample menus given  Start food logging       Assessment/Plan:      Follow up in approximately 2-3 months   Goals:  Food log (ie ) www myfitnesspal com,sparkpeople  com,loseit com,calorieking  com,etc  baritastic  No sugary beverages  At least 64oz of water daily  Increase physical activity by 10 minutes daily  Gradually increase physical activity to a goal of 5 days per week for 30 minutes of MODERATE intensity PLUS 2 days per week of FULL BODY resistance training  5-10 servings of fruits and vegetables per day and 25-35 grams of dietary fiber per day, gradually increasing  5767-6749 calories     Diagnoses and all orders for this visit:    Obesity, Class II, BMI 35-39 9  -     TSH, 3rd generation with Free T4 reflex; Future    Bariatric surgery status  -     TSH, 3rd generation with Free T4 reflex;  Future    Postsurgical malabsorption  -     TSH, 3rd generation with Free T4 reflex; Future    Depression, unspecified depression type    Mixed hyperlipidemia    Obstructive sleep apnea treated with continuous positive airway pressure (CPAP)        Subjective:   Chief Complaint   Patient presents with    Consult     Post op wt gain, patient ha sleep apnea,      Patient ID: Kan Preston  is a 49 y o  female with excess weight/obesity here to pursue weight management  Past Medical History:   Diagnosis Date    Acute bronchitis     Anxiety     Arthritis     Asthma     Bariatric surgery status     Continuous positive airway pressure dependence     COVID-19 vaccine series completed 04/25/2021    CPAP (continuous positive airway pressure) dependence     Depression     Diabetes mellitus     GERD (gastroesophageal reflux disease)     Hyperlipidemia     Hypertension     Morbid obesity     Postsurgical malabsorption     Sleep apnea     USES C PAP     HPI:  s/p Vertical William Skeeters Dr Hawa Luna 10/21/2019  Has been following up regarding her weight regain      The following portions of the patient's history were reviewed and updated as appropriate: allergies, current medications, past family history, past medical history, past social history, past surgical history and problem list     Review of Systems   Constitutional: Negative  Respiratory: Negative  Cardiovascular: Negative  Gastrointestinal: Negative  Neurological: Negative  Psychiatric/Behavioral: Negative  Objective:    /78   Pulse 68   Temp 97 6 °F (36 4 °C)   Ht 5' 4" (1 626 m)   Wt 104 kg (228 lb 3 2 oz)   SpO2 97%   Breastfeeding No   BMI 39 17 kg/m²     Physical Exam  Vitals and nursing note reviewed  Constitutional:       Appearance: Normal appearance  She is obese  HENT:      Head: Normocephalic and atraumatic  Eyes:      Extraocular Movements: Extraocular movements intact  Pupils: Pupils are equal, round, and reactive to light     Cardiovascular:      Rate and Rhythm: Normal rate  Pulmonary:      Effort: Pulmonary effort is normal    Musculoskeletal:         General: Normal range of motion  Cervical back: Normal range of motion  Skin:     General: Skin is warm and dry  Neurological:      General: No focal deficit present  Mental Status: She is alert and oriented to person, place, and time     Psychiatric:         Mood and Affect: Mood normal

## 2022-08-08 NOTE — PATIENT INSTRUCTIONS
Follow up in approximately 2-3 months   Goals:  Food log (ie ) www myfitnesspal com,sparkpeople  com,loseit com,calorieking  com,etc  baritastic  No sugary beverages  At least 64oz of water daily  Increase physical activity by 10 minutes daily   Gradually increase physical activity to a goal of 5 days per week for 30 minutes of MODERATE intensity PLUS 2 days per week of FULL BODY resistance training  5-10 servings of fruits and vegetables per day and 25-35 grams of dietary fiber per day, gradually increasing  4925-1398 calories

## 2022-08-30 ENCOUNTER — PREP FOR PROCEDURE (OUTPATIENT)
Dept: BARIATRICS | Facility: CLINIC | Age: 57
End: 2022-08-30

## 2022-08-30 DIAGNOSIS — Z98.84 BARIATRIC SURGERY STATUS: Primary | ICD-10-CM

## 2022-10-07 ENCOUNTER — TELEPHONE (OUTPATIENT)
Dept: BARIATRICS | Facility: CLINIC | Age: 57
End: 2022-10-07

## 2022-10-11 ENCOUNTER — TELEPHONE (OUTPATIENT)
Dept: BARIATRICS | Facility: CLINIC | Age: 57
End: 2022-10-11

## 2022-10-17 RX ORDER — SODIUM CHLORIDE 9 MG/ML
125 INJECTION, SOLUTION INTRAVENOUS CONTINUOUS
Status: CANCELLED | OUTPATIENT
Start: 2022-10-17

## 2022-10-19 ENCOUNTER — HOSPITAL ENCOUNTER (OUTPATIENT)
Dept: GASTROENTEROLOGY | Facility: HOSPITAL | Age: 57
Setting detail: OUTPATIENT SURGERY
Discharge: HOME | End: 2022-10-19
Attending: SURGERY
Payer: COMMERCIAL

## 2022-10-19 ENCOUNTER — ANESTHESIA EVENT (OUTPATIENT)
Dept: GASTROENTEROLOGY | Facility: HOSPITAL | Age: 57
End: 2022-10-19

## 2022-10-19 ENCOUNTER — ANESTHESIA (OUTPATIENT)
Dept: GASTROENTEROLOGY | Facility: HOSPITAL | Age: 57
End: 2022-10-19

## 2022-10-19 VITALS
SYSTOLIC BLOOD PRESSURE: 140 MMHG | BODY MASS INDEX: 38.41 KG/M2 | HEIGHT: 64 IN | RESPIRATION RATE: 16 BRPM | OXYGEN SATURATION: 98 % | HEART RATE: 72 BPM | DIASTOLIC BLOOD PRESSURE: 70 MMHG | WEIGHT: 225 LBS | TEMPERATURE: 97.8 F

## 2022-10-19 DIAGNOSIS — Z98.84 BARIATRIC SURGERY STATUS: ICD-10-CM

## 2022-10-19 RX ORDER — PROPOFOL 10 MG/ML
INJECTION, EMULSION INTRAVENOUS AS NEEDED
Status: DISCONTINUED | OUTPATIENT
Start: 2022-10-19 | End: 2022-10-19

## 2022-10-19 RX ORDER — SODIUM CHLORIDE 9 MG/ML
125 INJECTION, SOLUTION INTRAVENOUS CONTINUOUS
Status: DISCONTINUED | OUTPATIENT
Start: 2022-10-19 | End: 2022-10-23 | Stop reason: HOSPADM

## 2022-10-19 RX ADMIN — SODIUM CHLORIDE 125 ML/HR: 0.9 INJECTION, SOLUTION INTRAVENOUS at 10:29

## 2022-10-19 RX ADMIN — PROPOFOL 150 MG: 10 INJECTION, EMULSION INTRAVENOUS at 10:51

## 2022-10-19 NOTE — ANESTHESIA PREPROCEDURE EVALUATION
Procedure:  EGD    Relevant Problems   ANESTHESIA (within normal limits)      CARDIO   (+) Essential hypertension   (+) Mixed hyperlipidemia      ENDO   (+) Type 2 diabetes mellitus with complication (HCC)      GI/HEPATIC   (+) Bariatric surgery status   (+) GERD (gastroesophageal reflux disease)      NEURO/PSYCH   (+) Depression      PULMONARY   (+) Extrinsic asthma   (+) Obstructive sleep apnea treated with continuous positive airway pressure (CPAP)   (+) Sleep apnea      Other   (+) Obesity (BMI 35 0-39 9 without comorbidity)        Physical Exam    Airway    Mallampati score: III  TM Distance: >3 FB  Neck ROM: full     Dental       Cardiovascular  Rhythm: regular, Rate: normal,     Pulmonary  Breath sounds clear to auscultation,     Other Findings        Anesthesia Plan  ASA Score- 3     Anesthesia Type- IV sedation with anesthesia with ASA Monitors  Additional Monitors:   Airway Plan:           Plan Factors-Exercise tolerance (METS): >4 METS  Chart reviewed  Patient summary reviewed  Patient is not a current smoker  Induction- intravenous  Postoperative Plan-     Informed Consent- Anesthetic plan and risks discussed with patient

## 2022-10-19 NOTE — ANESTHESIA POSTPROCEDURE EVALUATION
Post-Op Assessment Note    CV Status:  Stable    Pain management: adequate     Mental Status:  Alert and awake   Hydration Status:  Euvolemic   PONV Controlled:  Controlled   Airway Patency:  Patent      Post Op Vitals Reviewed: Yes      Staff: Anesthesiologist         No complications documented      BP      Temp      Pulse     Resp      SpO2      /70   Pulse 72   Temp 97 8 °F (36 6 °C) (Temporal)   Resp 16   Ht 5' 4" (1 626 m)   Wt 102 kg (225 lb)   SpO2 98%   BMI 38 62 kg/m²

## 2022-10-19 NOTE — H&P
This is a 49 y o  female status post a Vertical Sleeve Gastrectomy with me in 10/21/2019  was previously trialed on phentermine which was not very effective with regards to her weight loss  Also tried topamax in the past but had bad side effects to this medication  Previously trialed metformin as well  - Discussed options of HealthyCORE-Intensive Lifestyle Intervention Program, Very Low Calorie Diet-VLCD and Conservative Program and the role of weight loss medications   -Initial weight loss goal of 5-10% weight loss for improved health  She has overall maintained her weight since her annual visit in June   She has developed significant amount of reflux  She is scheduled for her screening EGD       Physical Exam    /72   Pulse 65   Temp 97 8 °F (36 6 °C) (Temporal)   Resp 17   Ht 5' 4" (1 626 m)   Wt 102 kg (225 lb)   SpO2 97%   BMI 38 62 kg/m²    AAOx3  RRR  CTA B  Abdomen obese  Benign  A/P:    This is a 49 y o  female status post a sleeve gastrectomy in 2019 and now significant amount of reflux, scheduled screening EGD  Will proceed with the EGD and biopsies        [facility]  10/19/22  10:46 AM

## 2022-10-19 NOTE — ADDENDUM NOTE
Addendum  created 10/19/22 1210 by Jojo Caro DO    Attestation recorded in [facility] [place], [place]lo 97 deleted, [place] 97 filed

## 2022-10-31 ENCOUNTER — OFFICE VISIT (OUTPATIENT)
Dept: SLEEP CENTER | Facility: CLINIC | Age: 57
End: 2022-10-31

## 2022-10-31 VITALS
RESPIRATION RATE: 18 BRPM | DIASTOLIC BLOOD PRESSURE: 88 MMHG | HEIGHT: 64 IN | TEMPERATURE: 98.2 F | WEIGHT: 234 LBS | BODY MASS INDEX: 39.95 KG/M2 | OXYGEN SATURATION: 99 % | SYSTOLIC BLOOD PRESSURE: 128 MMHG | HEART RATE: 65 BPM

## 2022-10-31 DIAGNOSIS — Z99.89 OBSTRUCTIVE SLEEP APNEA TREATED WITH CONTINUOUS POSITIVE AIRWAY PRESSURE (CPAP): Primary | ICD-10-CM

## 2022-10-31 DIAGNOSIS — G47.33 OBSTRUCTIVE SLEEP APNEA TREATED WITH CONTINUOUS POSITIVE AIRWAY PRESSURE (CPAP): Primary | ICD-10-CM

## 2022-10-31 NOTE — PROGRESS NOTES
Progress Note - [address] 56 y o  female   :1965, MRN: [id number]  10/31/2022        Follow Up Evaluation / Problem:  Severe Obstructive Sleep Apnea  S/P gastric sleeve surgery  Obesity      Thank you for the opportunity of participating in the evaluation and care of this patient in the Sleep Clinic at [facility]  HPI: Karina Du is a 56y o  year old female  The patient presents for follow up of severe obstructive sleep apnea  She completed an overnight diagnostic polysomnogram in 2018, which identified severe obstructive sleep apnea  She had symptoms of excessive daytime sleepiness, frequent night time awakenings and loud snoring  She began the use of AutoPAP in 2018  She initially had some difficulty tolerating a low pressure of 5cm  Pressure was increased to 7cm to 14cm  Further adjustments were made to the settings in follow up  She is here to review annual compliance and effectiveness of treatment      Review of Systems      Genitourinary none   Cardiology none   Gastrointestinal frequent heartburn/acid reflux   Neurology forgetfulness, poor concentration or confusion,  and difficulty with memory   Constitutional fatigue   Integumentary itching   Psychiatry anxiety and depression   Musculoskeletal joint pain   Pulmonary none   ENT none   Endocrine none   Hematological none       Current Outpatient Medications:   •  acetaminophen (TYLENOL) 325 mg tablet, Take 3 tablets (975 mg total) by mouth every 8 (eight) hours, Disp: 30 tablet, Rfl: 0  •  albuterol (PROVENTIL HFA,VENTOLIN HFA) 90 mcg/act inhaler, Inhale 2 puffs every 4 (four) hours as needed , Disp: , Rfl:   •  ALPRAZolam (XANAX) 0 25 mg tablet, Take 0 25 mg by mouth daily at bedtime as needed, Disp: , Rfl:   •  amLODIPine (NORVASC) 10 mg tablet, Take 10 mg by mouth daily, Disp: , Rfl:   •  buPROPion (WELLBUTRIN XL) 300 mg 24 hr tablet, , Disp: , Rfl:   •  chlorthalidone 25 mg tablet, , Disp: , Rfl:   •  lamoTRIgine (LaMICtal) 200 MG tablet, Take 200 mg by mouth daily , Disp: , Rfl:   •  losartan (COZAAR) 100 MG tablet, , Disp: , Rfl:   •  omeprazole (PriLOSEC) 20 mg delayed release capsule, Take 1 capsule (20 mg total) by mouth 2 (two) times a day, Disp: 180 capsule, Rfl: 2  •  Sodium Hyaluronate 20 MG/2ML SOSY, knee injection every 6 months, Disp: , Rfl:   •  venlafaxine (EFFEXOR-XR) 150 mg 24 hr capsule, , Disp: , Rfl:   •  venlafaxine (EFFEXOR-XR) 75 mg 24 hr capsule, Take 75 mg by mouth daily , Disp: , Rfl:   •  diclofenac sodium (VOLTAREN) 1 %, Apply 2 g topically 3 (three) times a day, Disp: , Rfl:   •  glucose blood test strip, Check blood sugar QID (Patient not taking: No sig reported), Disp: , Rfl:     [place] Sleepiness Scale  Sitting and reading: Slight chance of dozing  Watching TV: Moderate chance of dozing  Sitting, inactive in a public place (e g  a theatre or a meeting): Would never doze  As a passenger in a car for an hour without a break: Moderate chance of dozing  Lying down to rest in the afternoon when circumstances permit: High chance of dozing  Sitting and talking to someone: Would never doze  Sitting quietly after a lunch without alcohol: Slight chance of dozing  In a car, while stopped for a few minutes in traffic: Would never doze  Total score: 9              Vitals:    10/31/22 0833   BP: 128/88   Pulse: 65   Resp: 18   Temp: 98 2 °F (36 8 °C)   SpO2: 99%   Weight: 106 kg (234 lb)   Height: 5' 4" (1 626 m)       Body mass index is 40 17 kg/m²  EPWORTH SLEEPINESS SCORE  Total score: 9      Past History Since Last Sleep Center Visit:   She has been having significant GERD symptoms over the past several months  She has had evaluation by Dr Robb Curtis and has a follow up visit later this week S/P testing  She reports that she was found to have a hiatal hernia    She states that she has gotten an adjustable bed to help with the GERD symptoms  She has not been using the CPAP equipment over the past several months  She feels some of the reason is due to the GERD symptoms  She is uncomfortable when trying to go to sleep and the CPAP is "one more thing"  Since not using CPAP, she wakes up frequently during the night and does not feel rested when she wakes up  She feels the starting pressure could be stronger  She uses a nasal pillow mask, which she finds comfortable  She received the recall replacement CPAP - DreamStation 2  The review of systems and following portions of the patient's history were reviewed and updated as appropriate: allergies, current medications, past family history, past medical history, past social history, past surgical history, and problem list         OBJECTIVE  Set up date:  12/27/18 - received DreamStation 2 in 2022  PAP Pressure: APAP 5-10cm  Mask type:  Nasal pillows  DME Provider: [facility]    Physical Exam:     General Appearance:   Alert, cooperative, no distress, appears stated age, obese     Head:   Normocephalic, without obvious abnormality, atraumatic     Eyes:    Conjunctiva/corneas clear          Nose:  Nares normal, septum slightly deviated, no drainage or sinus tenderness           Throat:  Lips, teeth and gums normal; tongue normal size and midline, mucosa moist with redundancy and low-lying soft palatal tissue, uvula only partially visualized, tonsils not visualized, Mallampati score 4      Neck:  Supple, symmetrical, trachea midline, no adenopathy;  Thyroid: No enlargement, tenderness or nodules; no carotid bruit or JVD     Lungs:      Clear to auscultation bilaterally, respirations unlabored     Heart:   Regular rate and rhythm, S1 and S2 normal, no murmur, rub or gallop       Extremities:  Extremities normal, atraumatic, no cyanosis or edema       Skin:  Skin color, texture, turgor normal, no rashes or lesions       Neurologic:  No focal deficits noted       ASSESSMENT / PLAN    1  Obstructive sleep apnea treated with continuous positive airway pressure (CPAP)  PAP DME Resupply/Reorder    PAP DME Pressure Change           Counseling / Coordination of Care  Total clinic time spent today 30 minutes  Greater than 50% of total time was spent with the patient and / or family counseling and / or coordination of care  A description of the counseling / coordination of care:     Impressions, Diagnostic results, Prognosis, Instructions for management, Risks and benefits of treatment, Patient and family education, Risk factor reductions and Importance of compliance with treatment    Today I reviewed the patient's compliance data  she has been able to use the equipment 46 7% of all days recorded  Average usage was 4 or more hours 26 7% of all days recorded  We discussed the importance of increasing use of CPAP equipment  We discussed the complications associated with untreated ADAM  The estimated AHI is 9 6 abnormal breathing events per hour, including 2 2 OA and 1 5 CA  A pressure change has been ordered, which may further improve ADAM and comfort using the equipment  Response to treatment has been good  She was encouraged to use her equipment more regularly  A prescription for supplies has been provided for the next year  She plans to continue using this equipment at the settings noted above for the next 4 months  At that timeshe will then return for a routine follow-up evaluation  I have asked the patient to contact the Sleep [address] [place] [address] if she encounters any difficulties prior to that time  The following instructions have been given to the patient today:    Patient Instructions   1  Continue use of CPAP equipment nightly  2  Continue to clean your equipment, as discussed  3  Contact the Sleep [address] [place] [address] with any questions or concerns prior to your next visit, as needed  4    Schedule visit for follow-up in 4 months      Nursing Support:  When: Monday through Friday 7A-5PM except holidays  Where: Our direct line is [phone]  If you are having a true emergency please call 911  In the event that the line is busy or it is after hours please leave a voice message and we will return your call  Please speak clearly, leaving your full name, birth date, best number to reach you and the reason for your call  Medication refills: We will need the name of the medication, the dosage, the ordering provider, whether you get a 30 or 90 day refill, and the pharmacy name and address  Medications will be ordered by the provider only  Nurses cannot call in prescriptions  Please allow 7 days for medication refills  Physician requested updates: If your provider requested that you call with an update after starting medication, please be ready to provide us the medication and dosage, what time you take your medication, the time you attempt to fall asleep, time you fall asleep, when you wake up, and what time you get out of bed  Sleep Study Results: We will contact you with sleep study results and/or next steps after the physician has reviewed your testing          Kristi Rick, [facility] [facility]

## 2022-10-31 NOTE — PATIENT INSTRUCTIONS
1   Continue use of CPAP equipment nightly  2  Continue to clean your equipment, as discussed  3  Contact the Sleep [address] [place] [address] with any questions or concerns prior to your next visit, as needed  4  Schedule visit for follow-up in 4 months      Nursing Support:  When: Monday through Friday 7A-5PM except holidays  Where: Our direct line is [phone]  If you are having a true emergency please call 911  In the event that the line is busy or it is after hours please leave a voice message and we will return your call  Please speak clearly, leaving your full name, birth date, best number to reach you and the reason for your call  Medication refills: We will need the name of the medication, the dosage, the ordering provider, whether you get a 30 or 90 day refill, and the pharmacy name and address  Medications will be ordered by the provider only  Nurses cannot call in prescriptions  Please allow 7 days for medication refills  Physician requested updates: If your provider requested that you call with an update after starting medication, please be ready to provide us the medication and dosage, what time you take your medication, the time you attempt to fall asleep, time you fall asleep, when you wake up, and what time you get out of bed  Sleep Study Results: We will contact you with sleep study results and/or next steps after the physician has reviewed your testing

## 2022-11-01 ENCOUNTER — TELEPHONE (OUTPATIENT)
Dept: SLEEP CENTER | Facility: CLINIC | Age: 57
End: 2022-11-01

## 2022-11-03 ENCOUNTER — OFFICE VISIT (OUTPATIENT)
Dept: BARIATRICS | Facility: CLINIC | Age: 57
End: 2022-11-03

## 2022-11-03 VITALS
HEIGHT: 64 IN | DIASTOLIC BLOOD PRESSURE: 78 MMHG | HEART RATE: 83 BPM | BODY MASS INDEX: 40.37 KG/M2 | SYSTOLIC BLOOD PRESSURE: 128 MMHG | WEIGHT: 236.5 LBS | RESPIRATION RATE: 18 BRPM | TEMPERATURE: 96 F

## 2022-11-03 DIAGNOSIS — Z98.890 STATUS POST DIGESTIVE SYSTEM SURGERY: ICD-10-CM

## 2022-11-03 DIAGNOSIS — Z98.84 STATUS POST LAPAROSCOPIC SLEEVE GASTRECTOMY: ICD-10-CM

## 2022-11-03 DIAGNOSIS — R12 HEART BURN: ICD-10-CM

## 2022-11-03 DIAGNOSIS — E66.01 OBESITY, CLASS III, BMI 40-49.9 (MORBID OBESITY) (HCC): Primary | ICD-10-CM

## 2022-11-03 RX ORDER — OMEPRAZOLE 20 MG/1
20 CAPSULE, DELAYED RELEASE ORAL 2 TIMES DAILY
Qty: 180 CAPSULE | Refills: 2 | Status: SHIPPED | OUTPATIENT
Start: 2022-11-03

## 2022-11-03 NOTE — PROGRESS NOTES
OFFICE VISIT - BARIATRIC SURGERY  Rosita Yuen 56 y o  female MRN: [id number]  Unit/Bed#:  Encounter: [id number]      HPI:  Arlester Lundborg is a 56 y o  female status post lap sleeve gastrectomy on 10/21/19 by Dr Jolynn Martines   Comes to the office today with complaints of belching, heartburn and belly pain  She has already had her gallbladder out, but the pain is in a similar location, crampy and sharp on the RUQ abdominal wall  She admits to some recent weight regain due to maladaptive eating patterns  The weight regain has contributed to increased heartburn and belching symptoms  Subjective   Initial:270 7  Vance: 185   Today, their weight is 236 5, with a BMI of 40 6  The EWL is 20%  Has started maladaptive eating pattern to soothe the discomfort  She is going to the Gym 4x weekly      Tolerating diet: yes  Main symptoms: belching, heartburn  Worse with food: no  Nausea: no  Vomiting: no  Diarrhea: no  Duration of symptoms: about a year  Smoking: no      Review of Systems  Denies fevers, chills, n/v/d, chest pain, sob, headaches, blurred vision, dizziness    Historical Information   Past Medical History:   Diagnosis Date   • Acute bronchitis    • Anxiety    • Arthritis    • Asthma    • Bariatric surgery status    • Continuous positive airway pressure dependence    • COVID-19 vaccine series completed 2021   • CPAP (continuous positive airway pressure) dependence    • Depression    • Diabetes mellitus    • GERD (gastroesophageal reflux disease)    • Hyperlipidemia    • Hypertension    • Morbid obesity    • Postsurgical malabsorption    • Sleep apnea     USES C PAP     Past Surgical History:   Procedure Laterality Date   • BREAST SURGERY      bilat lumpectomy   •  SECTION      twice   • CHOLECYSTECTOMY     • EGD     • HYSTERECTOMY     • KNEE ARTHROSCOPY Bilateral    • DE LAP, CLARE RESTRICT PROC, LONGITUDINAL GASTRECTOMY N/A 10/21/2019    Procedure: LAPAROSCOPIC SLEEVE GASTRECTOMY AND INTRAOPERATIVE EGD; Surgeon: Georgette Tena MD;  Location: [facility] OR;  Service: Bariatrics   • WISDOM TOOTH EXTRACTION       Social History   Social History     Substance and Sexual Activity   Alcohol Use Not Currently    Comment: occasional     Social History     Substance and Sexual Activity   Drug Use No     Social History     Tobacco Use   Smoking Status Never Smoker   Smokeless Tobacco Never Used       Objective       Current Vitals:   Blood Pressure: 128/78 (11/03/22 0916)  Pulse: 83 (11/03/22 0916)  Temperature: (!) 96 °F (35 6 °C) (11/03/22 0916)  Temp Source: Tympanic (11/03/22 0916)  Respirations: 18 (11/03/22 0916)  Height: 5' 4" (162 6 cm) (11/03/22 0916)  Weight - Scale: 107 kg (236 lb 8 oz) (11/03/22 0916)    Invasive Devices  Report    None                 Physical Exam:  Awake, alert, oriented, no acute distress  Normal work of breathing  Regular rate  Abd soft, obese, non tender, non distended, scars well healed  Moves all extremities  Normal affect  No gross neuro deficits      Pathology, and Other Studies: I have personally reviewed pertinent reports  Assessment:    Valente Pan is a 56 y o  female status post lap sleeve gastrectomy on 10/21/19 with Dr Daren Kinney  Workup thus far reviewed and discussed with patient: EGD for abnormal findings  Workup includes:     UGI        EGD    DATE OF SERVICE:  10/19/22     PHYSICIAN(S):  Attending:   Georgette Tena MD      Fellow:   No Staff Documented         INDICATION:  Bariatric surgery status     POST-OP DIAGNOSIS:  See the impression below      PREPROCEDURE:  Informed consent was obtained for the procedure, including sedation  Risks of perforation, hemorrhage, adverse drug reaction and aspiration were discussed  The patient was placed in the left lateral decubitus position      Patient was explained about the risks and benefits of the procedure  Risks including but not limited to bleeding, infection, and perforation were explained in detail   Also explained about less than 100% sensitivity with the exam and other alternatives      DETAILS OF PROCEDURE:  Patient was taken to the procedure room where a time out was performed to confirm correct patient and correct procedure  The patient underwent monitored anesthesia care, which was administered by an anesthesia professional  The patient's blood pressure, heart rate, level of consciousness, respirations and oxygen were monitored throughout the procedure  The scope was advanced to the second part of the duodenum  Prior to the procedure, the patient's H  Pylori status was negative  The patient experienced no blood loss  The procedure was not difficult  The patient tolerated the procedure well  There were no apparent complications  This is a 49 y o  female status post a Vertical Sleeve Gastrectomy with me in 10/21/2019  was previously trialed on phentermine which was not very effective with regards to her weight loss  Also tried topamax in the past but had bad side effects to this medication  Previously trialed metformin as well    - Discussed options of [facility]-Intensive Lifestyle Intervention Program, Very Low Calorie Diet-VLCD and Conservative Program and the role of weight loss medications   -Initial weight loss goal of 5-10% weight loss for improved health  She has overall maintained her weight since her annual visit in [facility]  She has developed significant amount of reflux      She is scheduled for her screening EGD       ANESTHESIA INFORMATION:  ASA: III  Anesthesia Type: IV Sedation with Anesthesia     MEDICATIONS:  sodium chloride 0 9 % infusion 200 mL*               *From user-documented volume   (Totals for administrations occurring from 1048 to 1101 on 10/19/22)         FINDINGS:  · Mild, localized erythematous mucosa in the GE junction  Several erosions limited to the mucosal folds and no larger than 5 mm in extent    · Mild, generalized erythematous mucosa in the stomach; performed 6 cold forceps biopsies  · Small sliding hiatal hernia (type I hiatal hernia) without Socrates lesions present        SPECIMENS:  ID Type Source Tests Collected by Time Destination   1 : r/o h pylori Tissue Stomach TISSUE EXAM Thea Chou MD 10/19/2022 1100              IMPRESSION:  Esophagitis LA grade A  Gastritis of the sleeve pouch  Significant amount of bile refluxing to the middle third of the sleeve  Small hiatal hernia most likely sliding type      Pathology from EGD     Lab Results   Component Value Date    FINALDX  10/19/2022     A  Stomach, biopsy:  -Gastric antral mucosa with chronic inactive gastritis  -Gastric oxyntic mucosa with minimal chronic inactive inflammation  -Negative for Helicobacter pylori organisms on H&E stain and immunostain             PLAN:  We discussed surgical options focusing mainly on possible conversion of sleeve to bypass  She is somewhat interested, but thinks she would like to pursue medical weight loss for now and reassess in a few months      Continue BID PPI  Enroll in revision pathway Level 1  Continue medical weight loss  Follow up in 3 months              Rashaun Cooper MD  Bariatric Surgery Fellow  11/3/2022  9:54 AM

## 2022-11-08 ENCOUNTER — OFFICE VISIT (OUTPATIENT)
Dept: BARIATRICS | Facility: CLINIC | Age: 57
End: 2022-11-08

## 2022-11-08 VITALS
SYSTOLIC BLOOD PRESSURE: 124 MMHG | BODY MASS INDEX: 40.05 KG/M2 | RESPIRATION RATE: 18 BRPM | WEIGHT: 234.6 LBS | HEART RATE: 75 BPM | DIASTOLIC BLOOD PRESSURE: 80 MMHG | OXYGEN SATURATION: 96 % | HEIGHT: 64 IN

## 2022-11-08 DIAGNOSIS — E66.01 OBESITY, CLASS III, BMI 40-49.9 (MORBID OBESITY) (HCC): Primary | ICD-10-CM

## 2022-11-08 DIAGNOSIS — E11.8 TYPE 2 DIABETES MELLITUS WITH COMPLICATION (HCC): ICD-10-CM

## 2022-11-08 DIAGNOSIS — E11.9 DIABETES MELLITUS (HCC): ICD-10-CM

## 2022-11-08 DIAGNOSIS — K21.9 GASTROESOPHAGEAL REFLUX DISEASE, UNSPECIFIED WHETHER ESOPHAGITIS PRESENT: ICD-10-CM

## 2022-11-08 RX ORDER — SEMAGLUTIDE 1.34 MG/ML
0.25 INJECTION, SOLUTION SUBCUTANEOUS WEEKLY
Qty: 1.5 ML | Refills: 1 | Status: SHIPPED | OUTPATIENT
Start: 2022-11-08

## 2022-11-08 NOTE — ASSESSMENT & PLAN NOTE
Lab Results   Component Value Date    HGBA1C 7 0 (H) 10/22/2022   PCP recently prescribed metformin    Will start ozempic side effects and titration discussed

## 2022-11-08 NOTE — ASSESSMENT & PLAN NOTE
On omeprazole 20mg BID  She will be taking it more consistently     Discussed possible increase in GERD with ozempic

## 2022-11-08 NOTE — ASSESSMENT & PLAN NOTE
-Patient is pursuing Conservative Program  -Initial weight loss goal of 5-10% weight loss for improved health  -Screening labs 10/22/22    Initial:228 2  Current:234 6  Change:+6 4 lb   Goal:      Goals:  -recommend planning out an afternoon snack  -to continue with exercise as is  -discussed adding on vegetable to dinner     To start ozempic for diabetes and help with weight loss  Patient denies personal and family history of MCT and MEN2 tumors  Patient denies personal history of pancreatitis  Side effects discussed but not limited to diarrhea, bloating, constipation, GI upset, heartburn, increased heart rate, headache, low blood sugar, fatigue and dizziness  Titration and medication administration discussed

## 2022-11-08 NOTE — PROGRESS NOTES
Assessment/Plan:    Obesity, Class III, BMI 40-49 9 (morbid obesity) ([facility] )  -Patient is pursuing Conservative Program  -Initial weight loss goal of 5-10% weight loss for improved health  -Screening labs 10/22/22    Initial:228 2  Current:234 6  Change:+6 4 lb   Goal:      Goals:  -recommend planning out an afternoon snack  -to continue with exercise as is  -discussed adding on vegetable to dinner     To start ozempic for diabetes and help with weight loss  Patient denies personal and family history of MCT and MEN2 tumors  Patient denies personal history of pancreatitis  Side effects discussed but not limited to diarrhea, bloating, constipation, GI upset, heartburn, increased heart rate, headache, low blood sugar, fatigue and dizziness  Titration and medication administration discussed  Diabetes mellitus ([facility] )    Lab Results   Component Value Date    HGBA1C 7 0 (H) 10/22/2022   PCP recently prescribed metformin  Will start ozempic side effects and titration discussed    GERD (gastroesophageal reflux disease)  On omeprazole 20mg BID  She will be taking it more consistently   Discussed possible increase in GERD with ozempic        Follow up in approximately 6 weeks with Non-Surgical Physician/Advanced Practitioner       Diagnoses and all orders for this visit:    Obesity, Class III, BMI 40-49 9 (morbid obesity) ([facility])  -     Semaglutide,0 25 or 0 5MG/DOS, (Ozempic, 0 25 or 0 5 MG/DOSE,) 2 MG/1 5ML SOPN; Inject 0 25 mg under the skin once a week For a month and then inject 0 5mg once a week    Type 2 diabetes mellitus with complication (HCC)  -     Semaglutide,0 25 or 0 5MG/DOS, (Ozempic, 0 25 or 0 5 MG/DOSE,) 2 MG/1 5ML SOPN; Inject 0 25 mg under the skin once a week For a month and then inject 0 5mg once a week    Diabetes mellitus ([facility] )    Gastroesophageal reflux disease, unspecified whether esophagitis present          Subjective:   Chief Complaint   Patient presents with   • Follow-up        Patient ID: [employer] Royal Rae  is a 56 y o  female with excess weight/obesity here to pursue weight managment  Patient is pursuing Conservative Program      HPI Here for follow up of post op weight gain  s/p Vertical Vidal Heard 10/21/2019    11/2 she saw surgical WM and discussed conversion of sleeve to bypass but would like to hold off on this  She gained 50 lb back from surgery but is still 70 lb less from her highest  Problem area is grazing and snacking    She has been on metformin prior and her PCP just started her on it again  She is taking 500mg once a day  Also on topamax and phentermine in the past for weight loss but had side effects of mood changes  She was started on wellbutrin for mood control but it has not helped her weight  She does have acid reflux  She does take omeprazole 20mg BID but has not been consistent  Wt Readings from Last 10 Encounters:   11/08/22 106 kg (234 lb 9 6 oz)   11/03/22 107 kg (236 lb 8 oz)   10/31/22 106 kg (234 lb)   10/19/22 102 kg (225 lb)   08/08/22 104 kg (228 lb 3 2 oz)   07/01/22 103 kg (226 lb 9 6 oz)   06/15/22 104 kg (229 lb 8 oz)   10/15/21 102 kg (225 lb)   09/17/21 101 kg (222 lb 3 2 oz)   08/04/21 98 2 kg (216 lb 8 oz)       Food logging:has prior but not good at it  As it increases her stress  Increased appetite/cravings:sweets, usually in the evening  Fruit/Vegetable servings:  Exercise:3-4 times a week gym weights and does ellipitical at home  Hydration:water   Water w/protein or hot cocoa w/ protein mix   Coffee 3-4 cups a day w/small amount cream    Diet recall:  B: yogurt (greek chobani)or crack an egg  L: varies-leftover or salad or sandwich  D: protein, carb (small portion) and vegetable   S: pretzels and ice cream       Colonoscopy-has cologuard    The following portions of the patient's history were reviewed and updated as appropriate:   She  has a past medical history of Acute bronchitis, Anxiety, Arthritis, Asthma, Bariatric surgery status, Continuous positive airway pressure dependence, COVID-19 vaccine series completed (2021), CPAP (continuous positive airway pressure) dependence, Depression, Diabetes mellitus, GERD (gastroesophageal reflux disease), Hyperlipidemia, Hypertension, Morbid obesity, Postsurgical malabsorption, and Sleep apnea  She   Patient Active Problem List    Diagnosis Date Noted   • Status post laparoscopic sleeve gastrectomy 2022   • Obesity (BMI 35 0-39 9 without comorbidity) 2020   • Encounter for surgical aftercare following surgery of digestive system 2020   • GERD (gastroesophageal reflux disease) 2020   • Bariatric surgery status 2020   • Postsurgical malabsorption 2020   • Type 2 diabetes mellitus with complication ([facility] )    • Sleep apnea 2019   • Preprocedural cardiovascular examination 2019   • Knee pain 2018   • Obstructive sleep apnea treated with continuous positive airway pressure (CPAP)    • Diabetes mellitus ([facility] ) 2018   • Essential hypertension 2018   • Depression 2018   • Obesity, Class III, BMI 40-49 9 (morbid obesity) ([facility] ) 2018   • Diabetes mellitus, new onset ([facility] )    • Dysmetabolic syndrome X    • Mixed hyperlipidemia 01/10/2011   • Bipolar affective disorder ([facility] ) 2010   • Extrinsic asthma 2010     She  has a past surgical history that includes Cholecystectomy; Hysterectomy; Knee arthroscopy (Bilateral);  section; EGD; [place] tooth extraction; Breast surgery; and pr lap, karlo restrict proc, longitudinal gastrectomy (N/A, 10/21/2019)  Her family history includes Brain cancer in her mother; Breast cancer in her mother; Diabetes in her father; Heart disease in her father; Hypertension in her father and mother; Melanoma in her father; Prostate cancer in her father; Skin cancer in her father  She  reports that she has never smoked   She has never used smokeless tobacco  She reports previous alcohol use  She reports that she does not use drugs  Current Outpatient Medications   Medication Sig Dispense Refill   • amLODIPine (NORVASC) 10 mg tablet Take 10 mg by mouth daily     • buPROPion (WELLBUTRIN XL) 300 mg 24 hr tablet      • chlorthalidone 25 mg tablet      • diclofenac sodium (VOLTAREN) 1 % Apply 2 g topically 3 (three) times a day     • glucose blood test strip      • lamoTRIgine (LaMICtal) 200 MG tablet Take 200 mg by mouth daily      • losartan (COZAAR) 100 MG tablet      • omeprazole (PriLOSEC) 20 mg delayed release capsule Take 1 capsule (20 mg total) by mouth 2 (two) times a day 180 capsule 2   • Semaglutide,0 25 or 0 5MG/DOS, (Ozempic, 0 25 or 0 5 MG/DOSE,) 2 MG/1 5ML SOPN Inject 0 25 mg under the skin once a week For a month and then inject 0 5mg once a week 1 5 mL 1   • Sodium Hyaluronate 20 MG/2ML SOSY knee injection every 6 months     • venlafaxine (EFFEXOR-XR) 150 mg 24 hr capsule      • venlafaxine (EFFEXOR-XR) 75 mg 24 hr capsule Take 75 mg by mouth daily      • acetaminophen (TYLENOL) 325 mg tablet Take 3 tablets (975 mg total) by mouth every 8 (eight) hours (Patient not taking: Reported on 11/8/2022) 30 tablet 0   • albuterol (PROVENTIL HFA,VENTOLIN HFA) 90 mcg/act inhaler Inhale 2 puffs every 4 (four) hours as needed  (Patient not taking: Reported on 11/8/2022)     • ALPRAZolam (XANAX) 0 25 mg tablet Take 0 25 mg by mouth daily at bedtime as needed (Patient not taking: Reported on 11/8/2022)       No current facility-administered medications for this visit       Current Outpatient Medications on File Prior to Visit   Medication Sig   • amLODIPine (NORVASC) 10 mg tablet Take 10 mg by mouth daily   • buPROPion (WELLBUTRIN XL) 300 mg 24 hr tablet    • chlorthalidone 25 mg tablet    • diclofenac sodium (VOLTAREN) 1 % Apply 2 g topically 3 (three) times a day   • glucose blood test strip    • lamoTRIgine (LaMICtal) 200 MG tablet Take 200 mg by mouth daily    • losartan (COZAAR) 100 MG tablet    • omeprazole (PriLOSEC) 20 mg delayed release capsule Take 1 capsule (20 mg total) by mouth 2 (two) times a day   • Sodium Hyaluronate 20 MG/2ML SOSY knee injection every 6 months   • venlafaxine (EFFEXOR-XR) 150 mg 24 hr capsule    • venlafaxine (EFFEXOR-XR) 75 mg 24 hr capsule Take 75 mg by mouth daily    • acetaminophen (TYLENOL) 325 mg tablet Take 3 tablets (975 mg total) by mouth every 8 (eight) hours (Patient not taking: Reported on 11/8/2022)   • albuterol (PROVENTIL HFA,VENTOLIN HFA) 90 mcg/act inhaler Inhale 2 puffs every 4 (four) hours as needed  (Patient not taking: Reported on 11/8/2022)   • ALPRAZolam (XANAX) 0 25 mg tablet Take 0 25 mg by mouth daily at bedtime as needed (Patient not taking: Reported on 11/8/2022)     No current facility-administered medications on file prior to visit  She is allergic to amoxicillin, amoxicillin-pot clavulanate, oxycodone-acetaminophen, sulfa antibiotics, erythromycin, and wound dressings       Review of Systems   Constitutional: Negative for chills and fever  Respiratory: Positive for wheezing  Negative for shortness of breath  Cardiovascular: Negative for chest pain and palpitations  Gastrointestinal: Negative for abdominal pain, constipation, diarrhea and vomiting  +GERD-partially controlled   Genitourinary: Negative for difficulty urinating  Musculoskeletal: Negative for arthralgias and back pain  Skin: Negative for rash  Neurological: Negative for headaches  Psychiatric/Behavioral: Negative for dysphoric mood  The patient is not nervous/anxious  Objective:    /80   Pulse 75   Resp 18   Ht 5' 4" (1 626 m)   Wt 106 kg (234 lb 9 6 oz)   SpO2 96%   BMI 40 27 kg/m²      Physical Exam  Vitals and nursing note reviewed  Constitutional:       General: She is not in acute distress  Appearance: She is well-developed  She is obese  HENT:      Head: Normocephalic and atraumatic     Eyes: Conjunctiva/sclera: Conjunctivae normal    Neck:      Thyroid: No thyromegaly  Pulmonary:      Effort: Pulmonary effort is normal  No respiratory distress  Skin:     Findings: No rash (visible)  Neurological:      Mental Status: She is alert and oriented to person, place, and time     Psychiatric:         Mood and Affect: Mood normal          Behavior: Behavior normal

## 2022-11-15 ENCOUNTER — TELEPHONE (OUTPATIENT)
Dept: BARIATRICS | Facility: CLINIC | Age: 57
End: 2022-11-15

## 2022-12-21 ENCOUNTER — OFFICE VISIT (OUTPATIENT)
Dept: BARIATRICS | Facility: CLINIC | Age: 57
End: 2022-12-21

## 2022-12-21 VITALS
DIASTOLIC BLOOD PRESSURE: 60 MMHG | WEIGHT: 226.6 LBS | BODY MASS INDEX: 38.9 KG/M2 | HEART RATE: 73 BPM | SYSTOLIC BLOOD PRESSURE: 120 MMHG

## 2022-12-21 DIAGNOSIS — E66.01 OBESITY, CLASS III, BMI 40-49.9 (MORBID OBESITY) (HCC): ICD-10-CM

## 2022-12-21 DIAGNOSIS — E11.8 TYPE 2 DIABETES MELLITUS WITH COMPLICATION (HCC): ICD-10-CM

## 2022-12-21 DIAGNOSIS — E66.9 OBESITY (BMI 35.0-39.9 WITHOUT COMORBIDITY): Primary | ICD-10-CM

## 2022-12-21 RX ORDER — SEMAGLUTIDE 1.34 MG/ML
0.5 INJECTION, SOLUTION SUBCUTANEOUS WEEKLY
Qty: 4.5 ML | Refills: 0 | Status: SHIPPED | OUTPATIENT
Start: 2022-12-21 | End: 2022-12-28 | Stop reason: SDUPTHER

## 2022-12-21 NOTE — ASSESSMENT & PLAN NOTE
Lab Results   Component Value Date    HGBA1C 7 0 (H) 10/22/2022   PCP recently prescribed metformin    Will continue ozempic 0 5mg

## 2022-12-21 NOTE — ASSESSMENT & PLAN NOTE
-Patient is pursuing Conservative Program  -Initial weight loss goal of 5-10% weight loss for improved health  -Screening labs 10/22/22    Initial:228 2  Current:226 6  Change:-1 6 from initial(-8 lb from last OV)    Goals:  -recommend planning out an afternoon snack  -to restart exercise after improvement of asthma   -continue with fluid intake  -discussed avoiding skipping lunch and having even snack then       To continue ozempic for diabetes and help with weight loss  Start dose 234 6 lb on 11/8/22  Patient denies personal and family history of MCT and MEN2 tumors  Patient denies personal history of pancreatitis  Side effects discussed but not limited to diarrhea, bloating, constipation, GI upset, heartburn, increased heart rate, headache, low blood sugar, fatigue and dizziness  Titration and medication administration discussed

## 2022-12-21 NOTE — PROGRESS NOTES
Assessment/Plan:    Type 2 diabetes mellitus with complication [facility])    Lab Results   Component Value Date    HGBA1C 7 0 (H) 10/22/2022   PCP recently prescribed metformin  Will continue ozempic 0 5mg   Obesity (BMI 35 0-39 9 without comorbidity)  -Patient is pursuing Conservative Program  -Initial weight loss goal of 5-10% weight loss for improved health  -Screening labs 10/22/22    Initial:228 2  Current:226 6  Change:-1 6 from initial(-8 lb from last OV)    Goals:  -recommend planning out an afternoon snack  -to restart exercise after improvement of asthma   -continue with fluid intake  -discussed avoiding skipping lunch and having even snack then       To continue ozempic for diabetes and help with weight loss  Start dose 234 6 lb on 11/8/22  Patient denies personal and family history of MCT and MEN2 tumors  Patient denies personal history of pancreatitis  Side effects discussed but not limited to diarrhea, bloating, constipation, GI upset, heartburn, increased heart rate, headache, low blood sugar, fatigue and dizziness  Titration and medication administration discussed  Follow up in approximately 2 months with Non-Surgical Physician/Advanced Practitioner       Diagnoses and all orders for this visit:    Obesity (BMI 35 0-39 9 without comorbidity)    Obesity, Class III, BMI 40-49 9 (morbid obesity) ([facility] [address] )  -     Semaglutide,0 25 or 0 5MG/DOS, (Ozempic, 0 25 or 0 5 MG/DOSE,) 2 MG/1 5ML SOPN; Inject 0 5 mg under the skin once a week For a month and then inject 0 5mg once a week    Type 2 diabetes mellitus with complication (HCC)  -     Semaglutide,0 25 or 0 5MG/DOS, (Ozempic, 0 25 or 0 5 MG/DOSE,) 2 MG/1 5ML SOPN; Inject 0 5 mg under the skin once a week For a month and then inject 0 5mg once a week          Subjective:   Chief Complaint   Patient presents with   • Follow-up     6 week MWM follow up        Patient ID: Matt Zaman  is a 56 y o  female with excess weight/obesity here to pursue weight managment  Patient is pursuing Conservative Program      HPI Here for follow up of post op weight gain  s/p Vertical Reymundo Reusing Dr Gigi Reaves 10/21/2019   She is taking ozempic 0 5mg  She does feel it is helping her appetite and denies any side effects  She is happy it has been helping, especially since she  is on medrol dose pack right now for asthma  Wt Readings from Last 10 Encounters:   12/21/22 103 kg (226 lb 9 6 oz)   11/08/22 106 kg (234 lb 9 6 oz)   11/03/22 107 kg (236 lb 8 oz)   10/31/22 106 kg (234 lb)   10/19/22 102 kg (225 lb)   08/08/22 104 kg (228 lb 3 2 oz)   07/01/22 103 kg (226 lb 9 6 oz)   06/15/22 104 kg (229 lb 8 oz)   10/15/21 102 kg (225 lb)   09/17/21 101 kg (222 lb 3 2 oz)       Food logging:not currently  Increased appetite/cravings:no  Fruit/Vegetable servings:  Exercise:was going 3-4 times a week but has asthma exacerbation  Hydration:at least 60 oz water, water w/protein  coffee    Diet Recall:  B (9-10AM): protein shake now or later at night  L (sometimes skips):salad w/protein or leftover  S: fruit, pretzels, cracker or cheese  L: protein, vegetable, carb      The following portions of the patient's history were reviewed and updated as appropriate: She  has a past medical history of Acute bronchitis, Anxiety, Arthritis, Asthma, Bariatric surgery status, Continuous positive airway pressure dependence, COVID-19 vaccine series completed (04/25/2021), CPAP (continuous positive airway pressure) dependence, Depression, Diabetes mellitus, GERD (gastroesophageal reflux disease), Hyperlipidemia, Hypertension, Morbid obesity, Postsurgical malabsorption, and Sleep apnea    She   Patient Active Problem List    Diagnosis Date Noted   • Status post laparoscopic sleeve gastrectomy 11/03/2022   • Obesity (BMI 35 0-39 9 without comorbidity) 05/05/2020   • Encounter for surgical aftercare following surgery of digestive system 05/05/2020   • GERD (gastroesophageal reflux disease) 2020   • Bariatric surgery status 2020   • Postsurgical malabsorption 2020   • Type 2 diabetes mellitus with complication ([place] [address] )    • Sleep apnea 2019   • Preprocedural cardiovascular examination 2019   • Knee pain 2018   • Obstructive sleep apnea treated with continuous positive airway pressure (CPAP)    • Diabetes mellitus ([place] [address] ) 2018   • Essential hypertension 2018   • Depression 2018   • Obesity, Class III, BMI 40-49 9 (morbid obesity) ([place] [address] ) 2018   • Diabetes mellitus, new onset ([place] [address] )    • Dysmetabolic syndrome X    • Mixed hyperlipidemia 01/10/2011   • Bipolar affective disorder ([place] [address] ) 2010   • Extrinsic asthma 2010     She  has a past surgical history that includes Cholecystectomy; Hysterectomy; Knee arthroscopy (Bilateral);  section; EGD; [place] tooth extraction; Breast surgery; and pr laps gstrc rstrictiv px longitudinal gastrectomy (N/A, 10/21/2019)  Her family history includes Brain cancer in her mother; Breast cancer in her mother; Diabetes in her father; Heart disease in her father; Hypertension in her father and mother; Melanoma in her father; Prostate cancer in her father; Skin cancer in her father  She  reports that she has never smoked  She has never used smokeless tobacco  She reports that she does not currently use alcohol  She reports that she does not use drugs    Current Outpatient Medications   Medication Sig Dispense Refill   • acetaminophen (TYLENOL) 325 mg tablet Take 3 tablets (975 mg total) by mouth every 8 (eight) hours 30 tablet 0   • albuterol (PROVENTIL HFA,VENTOLIN HFA) 90 mcg/act inhaler Inhale 2 puffs every 4 (four) hours as needed     • ALPRAZolam (XANAX) 0 25 mg tablet Take 0 25 mg by mouth daily at bedtime as needed     • amLODIPine (NORVASC) 10 mg tablet Take 10 mg by mouth daily     • chlorthalidone 25 mg tablet      • diclofenac sodium (VOLTAREN) 1 % Apply 2 g topically 3 (three) times a day     • glucose blood test strip      • lamoTRIgine (LaMICtal) 200 MG tablet Take 200 mg by mouth daily      • losartan (COZAAR) 100 MG tablet      • omeprazole (PriLOSEC) 20 mg delayed release capsule Take 1 capsule (20 mg total) by mouth 2 (two) times a day 180 capsule 2   • Semaglutide,0 25 or 0 5MG/DOS, (Ozempic, 0 25 or 0 5 MG/DOSE,) 2 MG/1 5ML SOPN Inject 0 5 mg under the skin once a week For a month and then inject 0 5mg once a week 4 5 mL 0   • Sodium Hyaluronate 20 MG/2ML SOSY knee injection every 6 months     • venlafaxine (EFFEXOR-XR) 150 mg 24 hr capsule      • venlafaxine (EFFEXOR-XR) 75 mg 24 hr capsule Take 75 mg by mouth daily        No current facility-administered medications for this visit       Current Outpatient Medications on File Prior to Visit   Medication Sig   • acetaminophen (TYLENOL) 325 mg tablet Take 3 tablets (975 mg total) by mouth every 8 (eight) hours   • albuterol (PROVENTIL HFA,VENTOLIN HFA) 90 mcg/act inhaler Inhale 2 puffs every 4 (four) hours as needed   • ALPRAZolam (XANAX) 0 25 mg tablet Take 0 25 mg by mouth daily at bedtime as needed   • amLODIPine (NORVASC) 10 mg tablet Take 10 mg by mouth daily   • chlorthalidone 25 mg tablet    • diclofenac sodium (VOLTAREN) 1 % Apply 2 g topically 3 (three) times a day   • glucose blood test strip    • lamoTRIgine (LaMICtal) 200 MG tablet Take 200 mg by mouth daily    • losartan (COZAAR) 100 MG tablet    • omeprazole (PriLOSEC) 20 mg delayed release capsule Take 1 capsule (20 mg total) by mouth 2 (two) times a day   • Sodium Hyaluronate 20 MG/2ML SOSY knee injection every 6 months   • venlafaxine (EFFEXOR-XR) 150 mg 24 hr capsule    • venlafaxine (EFFEXOR-XR) 75 mg 24 hr capsule Take 75 mg by mouth daily    • [DISCONTINUED] Semaglutide,0 25 or 0 5MG/DOS, (Ozempic, 0 25 or 0 5 MG/DOSE,) 2 MG/1 5ML SOPN Inject 0 25 mg under the skin once a week For a month and then inject 0 5mg once a week   • [DISCONTINUED] buPROPion (WELLBUTRIN XL) 300 mg 24 hr tablet      No current facility-administered medications on file prior to visit  She is allergic to amoxicillin, amoxicillin-pot clavulanate, oxycodone-acetaminophen, sulfa antibiotics, erythromycin, and wound dressings       Review of Systems   Constitutional: Negative for fever  Respiratory: Negative for shortness of breath  Cardiovascular: Negative for chest pain and palpitations  Gastrointestinal: Negative for abdominal pain, constipation, diarrhea and vomiting  Genitourinary: Negative for difficulty urinating  Musculoskeletal: Negative for arthralgias and back pain  Skin: Negative for rash  Neurological: Negative for headaches  Psychiatric/Behavioral: Negative for dysphoric mood  The patient is not nervous/anxious  Objective:    /60   Pulse 73   Wt 103 kg (226 lb 9 6 oz)   BMI 38 90 kg/m²      Physical Exam  Vitals and nursing note reviewed  Constitutional:       General: She is not in acute distress  Appearance: She is well-developed  She is obese  HENT:      Head: Normocephalic and atraumatic  Eyes:      Conjunctiva/sclera: Conjunctivae normal    Neck:      Thyroid: No thyromegaly  Pulmonary:      Effort: Pulmonary effort is normal  No respiratory distress  Skin:     Findings: No rash (visible)  Neurological:      Mental Status: She is alert and oriented to person, place, and time     Psychiatric:         Mood and Affect: Mood normal          Behavior: Behavior normal

## 2022-12-24 ENCOUNTER — OFFICE VISIT (OUTPATIENT)
Dept: URGENT CARE | Facility: CLINIC | Age: 57
End: 2022-12-24

## 2022-12-24 VITALS
HEIGHT: 65 IN | RESPIRATION RATE: 18 BRPM | OXYGEN SATURATION: 96 % | DIASTOLIC BLOOD PRESSURE: 73 MMHG | SYSTOLIC BLOOD PRESSURE: 138 MMHG | WEIGHT: 223 LBS | BODY MASS INDEX: 37.15 KG/M2 | TEMPERATURE: 98.4 F | HEART RATE: 93 BPM

## 2022-12-24 DIAGNOSIS — R50.9 FEVER, UNSPECIFIED FEVER CAUSE: ICD-10-CM

## 2022-12-24 DIAGNOSIS — J40 BRONCHITIS: Primary | ICD-10-CM

## 2022-12-24 RX ORDER — BENZONATATE 200 MG/1
200 CAPSULE ORAL 3 TIMES DAILY PRN
Qty: 20 CAPSULE | Refills: 0 | Status: SHIPPED | OUTPATIENT
Start: 2022-12-24

## 2022-12-24 RX ORDER — DEXTROMETHORPHAN HYDROBROMIDE AND PROMETHAZINE HYDROCHLORIDE 15; 6.25 MG/5ML; MG/5ML
5 SOLUTION ORAL 4 TIMES DAILY PRN
Qty: 118 ML | Refills: 1 | Status: SHIPPED | OUTPATIENT
Start: 2022-12-24

## 2022-12-24 RX ORDER — OSELTAMIVIR PHOSPHATE 75 MG/1
75 CAPSULE ORAL EVERY 12 HOURS SCHEDULED
Qty: 10 CAPSULE | Refills: 0 | Status: SHIPPED | OUTPATIENT
Start: 2022-12-24 | End: 2022-12-29

## 2022-12-24 RX ORDER — PREDNISONE 10 MG/1
TABLET ORAL
Qty: 15 TABLET | Refills: 0 | Status: SHIPPED | OUTPATIENT
Start: 2022-12-24

## 2022-12-24 NOTE — PATIENT INSTRUCTIONS
Cough, body aches, headache and fever-suspect influenza  Will treat with Tamiflu 75 mg-1 tablet by mouth twice daily  For cough try Phenergan DM-5 mL every 4 hours as needed for cough  May make you drowsy  You can also use Tessalon Perles 1 tablet 3 times daily on as-needed basis  Steroid anti-inflammatory prednisone 10 mg -1 tablet by mouth up to 3 times a day for up to 5 days as needed for body aches, fatigue  Tylenol on as needed basis for fever  Follow-up if symptoms persist or worsen despite treatment  Continue wearing a mask for 10 days to prevent spread of virus

## 2022-12-24 NOTE — PROGRESS NOTES
[facility] [facility]        NAME: Theodore Israel is a 56 y o  female  : 1965    MRN: [id number]  DATE: 2022  TIME: 11:48 AM    Assessment and Plan   Bronchitis [J40]  1  Bronchitis  predniSONE 10 mg tablet    benzonatate (TESSALON) 200 MG capsule    Promethazine-DM (PHENERGAN-DM) 6 25-15 mg/5 mL oral syrup      2  Fever, unspecified fever cause  oseltamivir (TAMIFLU) 75 mg capsule            Patient Instructions   Cough, body aches, headache and fever-suspect influenza  Will treat with Tamiflu 75 mg-1 tablet by mouth twice daily  For cough try Phenergan DM-5 mL every 4 hours as needed for cough  May make you drowsy  You can also use Tessalon Perles 1 tablet 3 times daily on as-needed basis  Steroid anti-inflammatory prednisone 10 mg -1 tablet by mouth up to 3 times a day for up to 5 days as needed for body aches, fatigue  Tylenol on as needed basis for fever  Follow-up if symptoms persist or worsen despite treatment  Continue wearing a mask for 10 days to prevent spread of virus  We had opted against testing for influenza as a would potentially delay treatment  Patient had close contact to her  with influenza and has clinical symptoms suggestive of influenza  Patient is in agreement with this  Follow up with PCP in 3-5 days  Proceed to  ER if symptoms worsen  Chief Complaint     Chief Complaint   Patient presents with   • Cold Like Symptoms     Patient c/o fever, headache, cough, vomiting, and body aches that started yesterday  History of Present Illness       Patient presents for evaluation of cough, body aches, headache and fever which started yesterday  Patient also has post-tussive emesis  Her  recently had similar symptoms and was evaluated by virtual visit and diagnosed with the flu  He started on Tamiflu which improved his symptoms  Patient tried over-the-counter DayQuil with minimal improvement        Review of Systems   Review of Systems Constitutional: Positive for chills, fatigue and fever  HENT: Positive for congestion  Respiratory: Positive for cough and wheezing  Gastrointestinal: Positive for vomiting           Current Medications       Current Outpatient Medications:   •  acetaminophen (TYLENOL) 325 mg tablet, Take 3 tablets (975 mg total) by mouth every 8 (eight) hours, Disp: 30 tablet, Rfl: 0  •  ALPRAZolam (XANAX) 0 25 mg tablet, Take 0 25 mg by mouth daily at bedtime as needed, Disp: , Rfl:   •  amLODIPine (NORVASC) 10 mg tablet, Take 10 mg by mouth daily, Disp: , Rfl:   •  benzonatate (TESSALON) 200 MG capsule, Take 1 capsule (200 mg total) by mouth 3 (three) times a day as needed for cough, Disp: 20 capsule, Rfl: 0  •  chlorthalidone 25 mg tablet, , Disp: , Rfl:   •  glucose blood test strip, , Disp: , Rfl:   •  lamoTRIgine (LaMICtal) 200 MG tablet, Take 200 mg by mouth daily , Disp: , Rfl:   •  losartan (COZAAR) 100 MG tablet, , Disp: , Rfl:   •  omeprazole (PriLOSEC) 20 mg delayed release capsule, Take 1 capsule (20 mg total) by mouth 2 (two) times a day, Disp: 180 capsule, Rfl: 2  •  oseltamivir (TAMIFLU) 75 mg capsule, Take 1 capsule (75 mg total) by mouth every 12 (twelve) hours for 5 days, Disp: 10 capsule, Rfl: 0  •  predniSONE 10 mg tablet, 1 tab by mouth 3 times a day for 5 days, Disp: 15 tablet, Rfl: 0  •  Promethazine-DM (PHENERGAN-DM) 6 25-15 mg/5 mL oral syrup, Take 5 mL by mouth 4 (four) times a day as needed for cough, Disp: 118 mL, Rfl: 1  •  Semaglutide,0 25 or 0 5MG/DOS, (Ozempic, 0 25 or 0 5 MG/DOSE,) 2 MG/1 5ML SOPN, Inject 0 5 mg under the skin once a week For a month and then inject 0 5mg once a week, Disp: 4 5 mL, Rfl: 0  •  venlafaxine (EFFEXOR-XR) 150 mg 24 hr capsule, , Disp: , Rfl:   •  albuterol (PROVENTIL HFA,VENTOLIN HFA) 90 mcg/act inhaler, Inhale 2 puffs every 4 (four) hours as needed, Disp: , Rfl:   •  diclofenac sodium (VOLTAREN) 1 %, Apply 2 g topically 3 (three) times a day, Disp: , Rfl:   • Sodium Hyaluronate 20 MG/2ML SOSY, knee injection every 6 months (Patient not taking: Reported on 2022), Disp: , Rfl:   •  venlafaxine (EFFEXOR-XR) 75 mg 24 hr capsule, Take 75 mg by mouth daily  (Patient not taking: Reported on 2022), Disp: , Rfl:     Current Allergies     Allergies as of 2022 - Reviewed 2022   Allergen Reaction Noted   • Amoxicillin Hives 2009   • Amoxicillin-pot clavulanate Hives    • Oxycodone-acetaminophen Vomiting    • Sulfa antibiotics Hives    • Erythromycin GI Intolerance 2009   • Wound dressings Rash             The following portions of the patient's history were reviewed and updated as appropriate: allergies, current medications, past family history, past medical history, past social history, past surgical history and problem list      Past Medical History:   Diagnosis Date   • Acute bronchitis    • Anxiety    • Arthritis    • Asthma    • Bariatric surgery status    • Continuous positive airway pressure dependence    • COVID-19 vaccine series completed 2021   • CPAP (continuous positive airway pressure) dependence    • Depression    • Diabetes mellitus    • GERD (gastroesophageal reflux disease)    • Hyperlipidemia    • Hypertension    • Morbid obesity    • Postsurgical malabsorption    • Sleep apnea     USES C PAP       Past Surgical History:   Procedure Laterality Date   • BREAST SURGERY      bilat lumpectomy   •  SECTION      twice   • CHOLECYSTECTOMY     • EGD     • HYSTERECTOMY     • KNEE ARTHROSCOPY Bilateral    • ID LAPS GSTRC RSTRICTIV PX LONGITUDINAL GASTRECTOMY N/A 10/21/2019    Procedure: LAPAROSCOPIC SLEEVE GASTRECTOMY AND INTRAOPERATIVE EGD;  Surgeon: Angela Grove MD;  Location: [facility];  Service: Bariatrics   • WISDOM TOOTH EXTRACTION         Family History   Problem Relation Age of Onset   • Breast cancer Mother    • Hypertension Mother    • Brain cancer Mother    • Prostate cancer Father    • Melanoma Father    • Diabetes Father    • Hypertension Father    • Heart disease Father    • Skin cancer Father    • Thyroid disease Neg Hx    • Stroke Neg Hx          Medications have been verified  Objective   /73   Pulse 93   Temp 98 4 °F (36 9 °C) (Temporal)   Resp 18   Ht 5' 5" (1 651 m)   Wt 101 kg (223 lb)   SpO2 96%   BMI 37 11 kg/m²   No LMP recorded  Patient has had a hysterectomy  Physical Exam     Physical Exam  Constitutional:       General: She is not in acute distress  Appearance: She is ill-appearing  She is not toxic-appearing  HENT:      Nose: Congestion present  No rhinorrhea  Mouth/Throat:      Mouth: Mucous membranes are moist       Pharynx: Oropharynx is clear  Cardiovascular:      Rate and Rhythm: Normal rate and regular rhythm  Heart sounds: Normal heart sounds  Pulmonary:      Breath sounds: Wheezing (Mild) present  No rhonchi or rales  Comments: Deep moist nonproductive cough  Musculoskeletal:      Cervical back: No rigidity or tenderness  Lymphadenopathy:      Cervical: No cervical adenopathy  Skin:     General: Skin is warm and dry  Coloration: Skin is not pale  Findings: No erythema  Neurological:      Mental Status: She is alert

## 2022-12-28 DIAGNOSIS — E66.01 OBESITY, CLASS III, BMI 40-49.9 (MORBID OBESITY) (HCC): ICD-10-CM

## 2022-12-28 DIAGNOSIS — E11.8 TYPE 2 DIABETES MELLITUS WITH COMPLICATION (HCC): ICD-10-CM

## 2022-12-28 RX ORDER — SEMAGLUTIDE 1.34 MG/ML
0.5 INJECTION, SOLUTION SUBCUTANEOUS WEEKLY
Qty: 4.5 ML | Refills: 0 | Status: SHIPPED | OUTPATIENT
Start: 2022-12-28 | End: 2023-03-13 | Stop reason: SDUPTHER

## 2022-12-31 ENCOUNTER — OFFICE VISIT (OUTPATIENT)
Dept: URGENT CARE | Facility: CLINIC | Age: 57
End: 2022-12-31

## 2022-12-31 VITALS
HEART RATE: 69 BPM | WEIGHT: 223 LBS | BODY MASS INDEX: 37.15 KG/M2 | RESPIRATION RATE: 18 BRPM | DIASTOLIC BLOOD PRESSURE: 94 MMHG | SYSTOLIC BLOOD PRESSURE: 156 MMHG | HEIGHT: 65 IN | OXYGEN SATURATION: 98 % | TEMPERATURE: 97.8 F

## 2022-12-31 DIAGNOSIS — J20.9 ACUTE BRONCHITIS, UNSPECIFIED ORGANISM: Primary | ICD-10-CM

## 2022-12-31 RX ORDER — LEVALBUTEROL INHALATION SOLUTION 0.31 MG/3ML
0.31 SOLUTION RESPIRATORY (INHALATION) EVERY 6 HOURS PRN
Qty: 84 ML | Refills: 0 | Status: SHIPPED | OUTPATIENT
Start: 2022-12-31

## 2022-12-31 RX ORDER — AZITHROMYCIN 250 MG/1
TABLET, FILM COATED ORAL
Qty: 6 TABLET | Refills: 0 | Status: SHIPPED | OUTPATIENT
Start: 2022-12-31 | End: 2023-01-04

## 2022-12-31 RX ORDER — ALBUTEROL SULFATE 90 UG/1
2 AEROSOL, METERED RESPIRATORY (INHALATION) EVERY 6 HOURS PRN
Qty: 8.5 G | Refills: 0 | Status: SHIPPED | OUTPATIENT
Start: 2022-12-31

## 2022-12-31 NOTE — PROGRESS NOTES
[facility]        NAME: Matt Zaman is a 56 y o  female  : 1965    MRN: [id number]  DATE: 2022  TIME: 4:01 PM    Assessment and Plan   Acute bronchitis, unspecified organism [J20 9]  1  Acute bronchitis, unspecified organism  azithromycin (ZITHROMAX) 250 mg tablet    levalbuterol (XOPENEX) 0 31 mg/3 mL nebulizer solution    albuterol (ProAir HFA) 90 mcg/act inhaler        Given patient's failure to improve with steroids and cough medication with codeine, will trial azithromycin and lev albuterol for nebulization  Patient is agreeable to this plan  She is requesting albuterol refill at this time  Discussed with patient that she should caution using albuterol and levalbuterol together  Patient Instructions     Z-Darnell as ordered  Levalbuterol as needed for chest tightness  Follow up with PCP in 3-5 days  Proceed to  ER if symptoms worsen  Chief Complaint     Chief Complaint   Patient presents with   • Cough     Coughing, wheezing ,last week was diagnosed with the Flu  Pt has Asthma         History of Present Illness       Patient presenting for evaluation of cough, chest tightness, shortness of breath and wheezing  Patient states that her symptoms have been progressing over the past week  She states that she was recently ill with the flu 2 weeks ago  Patient states that she has been given cough medication with codeine by her primary care provider as well as 2 doses of oral steroids  She also states that she has been taking albuterol with minimal relief  States that she is having minimal production to her cough  She states at times her cough is so forceful that she is having vomiting episodes  Review of Systems   Review of Systems   Constitutional: Negative for chills and fever  HENT: Positive for congestion  Negative for sore throat  Respiratory: Positive for cough, chest tightness, shortness of breath and wheezing      Cardiovascular: Negative for chest pain    Gastrointestinal: Positive for diarrhea  Negative for nausea and vomiting  All other systems reviewed and are negative          Current Medications       Current Outpatient Medications:   •  acetaminophen (TYLENOL) 325 mg tablet, Take 3 tablets (975 mg total) by mouth every 8 (eight) hours, Disp: 30 tablet, Rfl: 0  •  albuterol (ProAir HFA) 90 mcg/act inhaler, Inhale 2 puffs every 6 (six) hours as needed for wheezing, Disp: 8 5 g, Rfl: 0  •  albuterol (PROVENTIL HFA,VENTOLIN HFA) 90 mcg/act inhaler, Inhale 2 puffs every 4 (four) hours as needed, Disp: , Rfl:   •  ALPRAZolam (XANAX) 0 25 mg tablet, Take 0 25 mg by mouth daily at bedtime as needed, Disp: , Rfl:   •  amLODIPine (NORVASC) 10 mg tablet, Take 10 mg by mouth daily, Disp: , Rfl:   •  azithromycin (ZITHROMAX) 250 mg tablet, Take 2 tablets today then 1 tablet daily x 4 days, Disp: 6 tablet, Rfl: 0  •  benzonatate (TESSALON) 200 MG capsule, Take 1 capsule (200 mg total) by mouth 3 (three) times a day as needed for cough, Disp: 20 capsule, Rfl: 0  •  chlorthalidone 25 mg tablet, , Disp: , Rfl:   •  diclofenac sodium (VOLTAREN) 1 %, Apply 2 g topically 3 (three) times a day, Disp: , Rfl:   •  glucose blood test strip, , Disp: , Rfl:   •  lamoTRIgine (LaMICtal) 200 MG tablet, Take 200 mg by mouth daily , Disp: , Rfl:   •  levalbuterol (XOPENEX) 0 31 mg/3 mL nebulizer solution, Take 3 mL (0 31 mg total) by nebulization every 6 (six) hours as needed for wheezing or shortness of breath, Disp: 84 mL, Rfl: 0  •  losartan (COZAAR) 100 MG tablet, , Disp: , Rfl:   •  omeprazole (PriLOSEC) 20 mg delayed release capsule, Take 1 capsule (20 mg total) by mouth 2 (two) times a day, Disp: 180 capsule, Rfl: 2  •  predniSONE 10 mg tablet, 1 tab by mouth 3 times a day for 5 days, Disp: 15 tablet, Rfl: 0  •  Promethazine-DM (PHENERGAN-DM) 6 25-15 mg/5 mL oral syrup, Take 5 mL by mouth 4 (four) times a day as needed for cough, Disp: 118 mL, Rfl: 1  •  Semaglutide,0 25 or 0 5MG/DOS, (Ozempic, 0 25 or 0 5 MG/DOSE,) 2 MG/1 5ML SOPN, Inject 0 5 mg under the skin once a week, Disp: 4 5 mL, Rfl: 0  •  venlafaxine (EFFEXOR-XR) 150 mg 24 hr capsule, , Disp: , Rfl:   •  Sodium Hyaluronate 20 MG/2ML SOSY, knee injection every 6 months (Patient not taking: Reported on 2022), Disp: , Rfl:   •  venlafaxine (EFFEXOR-XR) 75 mg 24 hr capsule, Take 75 mg by mouth daily  (Patient not taking: Reported on 2022), Disp: , Rfl:     Current Allergies     Allergies as of 2022 - Reviewed 2022   Allergen Reaction Noted   • Amoxicillin Hives 2009   • Amoxicillin-pot clavulanate Hives    • Oxycodone-acetaminophen Vomiting    • Sulfa antibiotics Hives    • Erythromycin GI Intolerance 2009   • Wound dressings Rash             The following portions of the patient's history were reviewed and updated as appropriate: allergies, current medications, past family history, past medical history, past social history, past surgical history and problem list      Past Medical History:   Diagnosis Date   • Acute bronchitis    • Anxiety    • Arthritis    • Asthma    • Bariatric surgery status    • Continuous positive airway pressure dependence    • COVID-19 vaccine series completed 2021   • CPAP (continuous positive airway pressure) dependence    • Depression    • Diabetes mellitus    • GERD (gastroesophageal reflux disease)    • Hyperlipidemia    • Hypertension    • Morbid obesity    • Postsurgical malabsorption    • Sleep apnea     USES C PAP       Past Surgical History:   Procedure Laterality Date   • BREAST SURGERY      bilat lumpectomy   •  SECTION      twice   • CHOLECYSTECTOMY     • EGD     • HYSTERECTOMY     • KNEE ARTHROSCOPY Bilateral    • WA LAPS [facility] RSTRICTIV PX LONGITUDINAL GASTRECTOMY N/A 10/21/2019    Procedure: LAPAROSCOPIC SLEEVE GASTRECTOMY AND INTRAOPERATIVE EGD;  Surgeon: Trina Mcgee MD;  Location: [facility];  Service: Bariatrics   • WISDOM TOOTH EXTRACTION         Family History   Problem Relation Age of Onset   • Breast cancer Mother    • Hypertension Mother    • Brain cancer Mother    • Prostate cancer Father    • Melanoma Father    • Diabetes Father    • Hypertension Father    • Heart disease Father    • Skin cancer Father    • Thyroid disease Neg Hx    • Stroke Neg Hx          Medications have been verified  Objective   /94   Pulse 69   Temp 97 8 °F (36 6 °C)   Resp 18   Ht 5' 5" (1 651 m)   Wt 101 kg (223 lb)   SpO2 98%   BMI 37 11 kg/m²        Physical Exam     Physical Exam  Vitals reviewed  Constitutional:       General: She is not in acute distress  Appearance: Normal appearance  She is well-developed  She is not ill-appearing, toxic-appearing or diaphoretic  HENT:      Head: Normocephalic and atraumatic  Right Ear: Tympanic membrane normal       Left Ear: Tympanic membrane normal       Nose: Congestion present  No rhinorrhea  Mouth/Throat:      Pharynx: No posterior oropharyngeal erythema  Tonsils: No tonsillar exudate or tonsillar abscesses  Eyes:      General:         Right eye: No discharge  Left eye: No discharge  Cardiovascular:      Rate and Rhythm: Normal rate and regular rhythm  Pulses: Normal pulses  Heart sounds: Normal heart sounds  No murmur heard  No friction rub  No gallop  Pulmonary:      Effort: Pulmonary effort is normal  No respiratory distress  Breath sounds: No stridor  Examination of the right-upper field reveals wheezing  Examination of the left-upper field reveals wheezing  Examination of the right-middle field reveals wheezing  Examination of the left-middle field reveals wheezing  Examination of the right-lower field reveals wheezing  Examination of the left-lower field reveals wheezing  Wheezing present  No rhonchi or rales  Chest:      Chest wall: No tenderness     Abdominal:      General: Bowel sounds are normal       Palpations: Abdomen is soft       Tenderness: There is no abdominal tenderness  Musculoskeletal:         General: Normal range of motion  Skin:     General: Skin is warm and dry  Neurological:      Mental Status: She is alert     Psychiatric:         Mood and Affect: Mood normal          Behavior: Behavior normal

## 2023-02-22 ENCOUNTER — OFFICE VISIT (OUTPATIENT)
Dept: BARIATRICS | Facility: CLINIC | Age: 58
End: 2023-02-22

## 2023-02-22 VITALS
WEIGHT: 220 LBS | BODY MASS INDEX: 36.65 KG/M2 | SYSTOLIC BLOOD PRESSURE: 121 MMHG | HEIGHT: 65 IN | DIASTOLIC BLOOD PRESSURE: 85 MMHG | HEART RATE: 70 BPM

## 2023-02-22 DIAGNOSIS — F31.9 BIPOLAR AFFECTIVE DISORDER, REMISSION STATUS UNSPECIFIED (HCC): ICD-10-CM

## 2023-02-22 DIAGNOSIS — K21.9 GERD (GASTROESOPHAGEAL REFLUX DISEASE): ICD-10-CM

## 2023-02-22 DIAGNOSIS — K91.2 POSTSURGICAL MALABSORPTION: ICD-10-CM

## 2023-02-22 DIAGNOSIS — Z98.84 BARIATRIC SURGERY STATUS: ICD-10-CM

## 2023-02-22 DIAGNOSIS — E66.9 OBESITY, CLASS II, BMI 35-39.9: Primary | ICD-10-CM

## 2023-02-22 DIAGNOSIS — I10 ESSENTIAL HYPERTENSION: ICD-10-CM

## 2023-02-22 NOTE — PROGRESS NOTES
Assessment/Plan:    Follow up in approximately 2 months     Obesity (BMI 35 0-39 9 without comorbidity)  -Patient is pursuing Conservative Program  -Initial weight loss goal of 5-10% weight loss for improved health  -Screening labs 10/22/22  - pt doing very well with ozempic without side effects, has been on 0 5 mg for at least 6 weeks  Overall is helping with her weight loss but recently starting to feel a bit more cravings  Has one more pen left for the 0 5 mg dose  - overall pt is eating healthy and watching her food choices  had asthma flare and flu over the last few months so exercise was limited , but just started to get back to gym the last 2 weeks     Initial:228 2  Current: 220  Last visit:226 6       To continue ozempic for diabetes and help with weight loss  Start dose 234 6 lb on 11/8/22  Patient denies personal and family history of MCT and MEN2 tumors  Patient denies personal history of pancreatitis  Side effects discussed but not limited to diarrhea, bloating, constipation, GI upset, heartburn, increased heart rate, headache, low blood sugar, fatigue and dizziness  Titration and medication administration discussed  GERD  - pt continues with reflux sxs although states overall better with weight loss  Most bothersome sxs are belching and regurgitation at night however also improving    - Her most recent EGD was 10/2022 and she continues on PPI BID     Goals:  Food log (ie ) www myfitnesspal com,sparkpeople  com,loseit com,calorieking  com,etc  baritastic  No sugary beverages  At least 64oz of water daily  Increase physical activity by 10 minutes daily   Gradually increase physical activity to a goal of 5 days per week for 30 minutes of MODERATE intensity PLUS 2 days per week of FULL BODY resistance training  5-10 servings of fruits and vegetables per day and 25-35 grams of dietary fiber per day, gradually increasing       Diagnoses and all orders for this visit:    Obesity, Class II, BMI 35-39 9    Bariatric surgery status    Postsurgical malabsorption    Bipolar affective disorder, remission status unspecified (HCC)    GERD (gastroesophageal reflux disease)        Subjective:   Chief Complaint   Patient presents with   • Follow-up     MWM 2 mth post-op wt gain,     Patient ID: Quin Vial  is a 56 y o  female with excess weight/obesity here to pursue weight management  Patient is pursuing Conservative Program    HPI  The patient presents for MWM follow up  Food logging: no  Increased appetite/cravings: overall controlled  Fruit/Vegetable servings: daily  Exercise: had asthma flare and flu over the last few months so exercise was limited , but just started to get back to gym the last 2 weeks    The following portions of the patient's history were reviewed and updated as appropriate: allergies, current medications, past family history, past medical history, past social history, past surgical history and problem list     Review of Systems   Constitutional: Negative  Respiratory: Negative  Cardiovascular: Negative for chest pain and palpitations  Gastrointestinal: Negative for abdominal pain, blood in stool, constipation, diarrhea, nausea and vomiting  Neurological: Negative  Psychiatric/Behavioral: Negative  Objective:    /85   Pulse 70   Ht 5' 5" (1 651 m)   Wt 99 8 kg (220 lb)   BMI 36 61 kg/m²      Physical Exam  Vitals and nursing note reviewed  Constitutional:       Appearance: Normal appearance  She is obese  HENT:      Head: Normocephalic and atraumatic  Eyes:      Extraocular Movements: Extraocular movements intact  Pupils: Pupils are equal, round, and reactive to light  Cardiovascular:      Rate and Rhythm: Normal rate and regular rhythm  Pulmonary:      Effort: Pulmonary effort is normal       Breath sounds: Normal breath sounds  Musculoskeletal:         General: Normal range of motion  Cervical back: Normal range of motion     Skin: General: Skin is warm and dry  Neurological:      General: No focal deficit present  Mental Status: She is alert and oriented to person, place, and time  Psychiatric:         Mood and Affect: Mood normal      30 minute visit, >50% face-to-face time spent counseling patient on diet behavior and exercise modification for weight loss

## 2023-03-10 NOTE — PATIENT INSTRUCTIONS
1   Continue use of CPAP equipment nightly  2  Continue to clean your equipment, as discussed  3  Contact the Sleep [facility] with any questions or concerns prior to your next visit, as needed  4  Schedule visit for follow-up in 6 months  5    Since you are using the Dream  adal, call if you are seeing AHI consistently greater than 5, call for pressure change
Nursing

## 2023-03-27 ENCOUNTER — OFFICE VISIT (OUTPATIENT)
Dept: SLEEP CENTER | Facility: CLINIC | Age: 58
End: 2023-03-27

## 2023-03-27 VITALS
BODY MASS INDEX: 36.65 KG/M2 | HEIGHT: 65 IN | DIASTOLIC BLOOD PRESSURE: 82 MMHG | WEIGHT: 220 LBS | SYSTOLIC BLOOD PRESSURE: 144 MMHG | RESPIRATION RATE: 18 BRPM

## 2023-03-27 DIAGNOSIS — G47.33 OBSTRUCTIVE SLEEP APNEA TREATED WITH CONTINUOUS POSITIVE AIRWAY PRESSURE (CPAP): Primary | ICD-10-CM

## 2023-03-27 DIAGNOSIS — Z99.89 OBSTRUCTIVE SLEEP APNEA TREATED WITH CONTINUOUS POSITIVE AIRWAY PRESSURE (CPAP): Primary | ICD-10-CM

## 2023-03-27 NOTE — PROGRESS NOTES
Progress Note - [facility] 56 y o  female   :1965, MRN: [id number]  3/27/2023        Follow Up Evaluation / Problem:  Severe Obstructive Sleep Apnea  S/P gastric sleeve surgery  Obesity      Thank you for the opportunity of participating in the evaluation and care of this patient in the Sleep Clinic at [facility]  HPI: Dia Frank is a 56y o  year old female  The patient presents for follow up of severe obstructive sleep apnea  She completed an overnight diagnostic polysomnogram in 2018, which identified severe obstructive sleep apnea  She had symptoms of excessive daytime sleepiness, frequent night time awakenings and loud snoring  She began the use of AutoPAP in 2018  She initially had some difficulty tolerating a low pressure of 5cm  Pressure was increased to 7cm to 14cm  Further adjustments were made to the settings in follow up  She is here to review annual compliance and effectiveness of treatment      Review of Systems      Genitourinary need to urinate more than twice a night   Cardiology Frequent chest pain or angina,    Gastrointestinal frequent heartburn/acid reflux   Neurology forgetfulness and difficulty with memory   Constitutional fatigue and weight change   Integumentary itching   Psychiatry depression   Musculoskeletal joint pain   Pulmonary shortness of breath with activity, chest tightness, wheezing and frequent cough   ENT none   Endocrine none   Hematological none         Current Outpatient Medications:   •  albuterol (PROVENTIL HFA,VENTOLIN HFA) 90 mcg/act inhaler, Inhale 2 puffs every 4 (four) hours as needed, Disp: , Rfl:   •  ALPRAZolam (XANAX) 0 25 mg tablet, Take 0 25 mg by mouth daily at bedtime as needed, Disp: , Rfl:   •  amLODIPine (NORVASC) 10 mg tablet, Take 10 mg by mouth daily, Disp: , Rfl:   •  chlorthalidone 25 mg tablet, , Disp: , Rfl:   • diclofenac sodium (VOLTAREN) 1 %, Apply 2 g topically 3 (three) times a day, Disp: , Rfl:   •  glucose blood test strip, , Disp: , Rfl:   •  lamoTRIgine (LaMICtal) 200 MG tablet, Take 200 mg by mouth daily , Disp: , Rfl:   •  losartan (COZAAR) 100 MG tablet, , Disp: , Rfl:   •  omeprazole (PriLOSEC) 20 mg delayed release capsule, Take 1 capsule (20 mg total) by mouth 2 (two) times a day, Disp: 180 capsule, Rfl: 2  •  semaglutide, 0 25 or 0 5 mg/dose, (Ozempic, 0 25 or 0 5 MG/DOSE,) 2 mg/1 5 mL injection pen, Inject 0 38 mL (0 5 mg total) under the skin once a week, Disp: 4 5 mL, Rfl: 0  •  venlafaxine (EFFEXOR-XR) 150 mg 24 hr capsule, , Disp: , Rfl:   •  acetaminophen (TYLENOL) 325 mg tablet, Take 3 tablets (975 mg total) by mouth every 8 (eight) hours, Disp: 30 tablet, Rfl: 0  •  albuterol (ProAir HFA) 90 mcg/act inhaler, Inhale 2 puffs every 6 (six) hours as needed for wheezing, Disp: 8 5 g, Rfl: 0  •  benzonatate (TESSALON) 200 MG capsule, Take 1 capsule (200 mg total) by mouth 3 (three) times a day as needed for cough, Disp: 20 capsule, Rfl: 0  •  levalbuterol (XOPENEX) 0 31 mg/3 mL nebulizer solution, Take 3 mL (0 31 mg total) by nebulization every 6 (six) hours as needed for wheezing or shortness of breath, Disp: 84 mL, Rfl: 0  •  predniSONE 10 mg tablet, 1 tab by mouth 3 times a day for 5 days, Disp: 15 tablet, Rfl: 0  •  Promethazine-DM (PHENERGAN-DM) 6 25-15 mg/5 mL oral syrup, Take 5 mL by mouth 4 (four) times a day as needed for cough, Disp: 118 mL, Rfl: 1  •  Sodium Hyaluronate 20 MG/2ML SOSY, knee injection every 6 months (Patient not taking: Reported on 12/24/2022), Disp: , Rfl:   •  venlafaxine (EFFEXOR-XR) 75 mg 24 hr capsule, Take 75 mg by mouth daily  (Patient not taking: Reported on 12/24/2022), Disp: , Rfl:     [place] Sleepiness Scale  Sitting and reading:  Moderate chance of dozing  Watching TV: Slight chance of dozing  Sitting, inactive in a public place (e g  a theatre or a meeting): High "chance of dozing  As a passenger in a car for an hour without a break: Slight chance of dozing  Lying down to rest in the afternoon when circumstances permit: High chance of dozing  Sitting and talking to someone: Would never doze  Sitting quietly after a lunch without alcohol: Moderate chance of dozing  In a car, while stopped for a few minutes in traffic: Slight chance of dozing  Total score: 13              Vitals:    03/27/23 1403   BP: 144/82   Resp: 18   Weight: 99 8 kg (220 lb)   Height: 5' 5\" (1 651 m)       Body mass index is 36 61 kg/m²  EPWORTH SLEEPINESS SCORE  Total score: 13      Past History Since Last Sleep Center Visit:   She continues with GERD symptoms  She elevates the head of her adjustable bed  She had sleeve gastrectomy in 2019 and may need gastric bypass, due to symptoms  She has been using CPAP equipment, but can't tolerate it at times, due to GERD  She feels nasal pillows are comfortable but opens her mouth at times during sleep, which wakes her  The review of systems and following portions of the patient's history were reviewed and updated as appropriate: allergies, current medications, past family history, past medical history, past social history, past surgical history, and problem list         OBJECTIVE  Set up date:  12/27/18 - received DreamStation 2 in 2022  PAP Pressure: APAP 5-10cm  Mask type:  Nasal pillows  DME Provider: [facility]    Physical Exam:     General Appearance:   Alert, cooperative, no distress, appears stated age, obese     Lungs:    Heart:  Clear to auscultation bilaterally, respirations unlabored      Regular rate and rhythm, S1 and S2 normal, no murmur, rub or gallop              ASSESSMENT / PLAN    1  Obstructive sleep apnea treated with continuous positive airway pressure (CPAP)                Counseling / Coordination of Care  Total clinic time spent today 30 minutes   Greater than 50% of total time was spent with the " patient and / or family counseling and / or coordination of care  A description of the counseling / coordination of care:     Impressions, Diagnostic results, Prognosis, Instructions for management, Risks and benefits of treatment, Patient and family education, Risk factor reductions and Importance of compliance with treatment    Today I reviewed the patient's compliance data  she has been able to use the equipment 38 9% of all days recorded  Average usage was 4 or more hours 20% of all days recorded  We discussed the importance of increasing use of CPAP equipment  We discussed the complications associated with untreated ADAM   EDS is likely due to untreated ADAM  The estimated AHI is 8 9 abnormal breathing events per hour  A chin strap was recommended  A pressure change may be needed once she is able to increase usage and keep her mouth closed while using the nasal pillow mask with a chin strap  Response to treatment has been fair  She was encouraged to use her equipment more regularly  She plans to continue using this equipment at the settings noted above for the next 6 months  At that timeshe will then return for a routine follow-up evaluation  I have asked the patient to contact the [address] if she encounters any difficulties prior to that time  She was advised to avoid driving if feeling drowsy  The following instructions have been given to the patient today:    Patient Instructions   1  Continue use of CPAP equipment nightly - recommend adding a chin strap  2  Continue to clean your equipment, as discussed  3  Contact the [address] with any questions or concerns prior to your next visit, as needed  4  Schedule visit for follow-up in 6 months        Nursing Support:  When: Monday through Friday 7A-5PM except holidays  Where: Our direct line is [phone]  If you are having a true emergency please call 911    In the event that the line is busy or it is after hours please leave a voice message and we will return your call  Please speak clearly, leaving your full name, birth date, best number to reach you and the reason for your call  Medication refills: We will need the name of the medication, the dosage, the ordering provider, whether you get a 30 or 90 day refill, and the pharmacy name and address  Medications will be ordered by the provider only  Nurses cannot call in prescriptions  Please allow 7 days for medication refills  Physician requested updates: If your provider requested that you call with an update after starting medication, please be ready to provide us the medication and dosage, what time you take your medication, the time you attempt to fall asleep, time you fall asleep, when you wake up, and what time you get out of bed  Sleep Study Results: We will contact you with sleep study results and/or next steps after the physician has reviewed your testing          Dung Chew [address] [facility]

## 2023-03-27 NOTE — PATIENT INSTRUCTIONS
1   Continue use of CPAP equipment nightly - recommend adding a chin strap  2  Continue to clean your equipment, as discussed  3  Contact the Sleep [address] [place] [address] with any questions or concerns prior to your next visit, as needed  4  Schedule visit for follow-up in 6 months        Nursing Support:  When: Monday through Friday 7A-5PM except holidays  Where: Our direct line is [phone]  If you are having a true emergency please call 911  In the event that the line is busy or it is after hours please leave a voice message and we will return your call  Please speak clearly, leaving your full name, birth date, best number to reach you and the reason for your call  Medication refills: We will need the name of the medication, the dosage, the ordering provider, whether you get a 30 or 90 day refill, and the pharmacy name and address  Medications will be ordered by the provider only  Nurses cannot call in prescriptions  Please allow 7 days for medication refills  Physician requested updates: If your provider requested that you call with an update after starting medication, please be ready to provide us the medication and dosage, what time you take your medication, the time you attempt to fall asleep, time you fall asleep, when you wake up, and what time you get out of bed  Sleep Study Results: We will contact you with sleep study results and/or next steps after the physician has reviewed your testing  1900-  Assumed care. Notes, labs, meds, orders and plan of care reviewed. Pt resting quietly in bed. Denies pain or complaint. Family at bedside. Plan of care and related to family and all questions answered to their satisfaction. Assessment completed. 2330-  Report given to oncoming RN. SBAR, KARDEX, MAR and plan of care reviewed. Care transferred.

## 2023-05-04 ENCOUNTER — OFFICE VISIT (OUTPATIENT)
Dept: BARIATRICS | Facility: CLINIC | Age: 58
End: 2023-05-04

## 2023-05-04 VITALS
HEART RATE: 70 BPM | BODY MASS INDEX: 36.02 KG/M2 | RESPIRATION RATE: 16 BRPM | WEIGHT: 216.2 LBS | SYSTOLIC BLOOD PRESSURE: 110 MMHG | HEIGHT: 65 IN | DIASTOLIC BLOOD PRESSURE: 60 MMHG

## 2023-05-04 DIAGNOSIS — F31.9 BIPOLAR AFFECTIVE DISORDER, REMISSION STATUS UNSPECIFIED (HCC): ICD-10-CM

## 2023-05-04 DIAGNOSIS — G47.33 OBSTRUCTIVE SLEEP APNEA TREATED WITH CONTINUOUS POSITIVE AIRWAY PRESSURE (CPAP): ICD-10-CM

## 2023-05-04 DIAGNOSIS — E66.9 DIABETES MELLITUS TYPE 2 IN OBESE (HCC): ICD-10-CM

## 2023-05-04 DIAGNOSIS — K91.2 POSTSURGICAL MALABSORPTION: Chronic | ICD-10-CM

## 2023-05-04 DIAGNOSIS — K21.9 GASTROESOPHAGEAL REFLUX DISEASE, UNSPECIFIED WHETHER ESOPHAGITIS PRESENT: ICD-10-CM

## 2023-05-04 DIAGNOSIS — E66.9 OBESITY, CLASS II, BMI 35-39.9: Primary | ICD-10-CM

## 2023-05-04 DIAGNOSIS — I10 ESSENTIAL HYPERTENSION: ICD-10-CM

## 2023-05-04 DIAGNOSIS — E11.69 DIABETES MELLITUS TYPE 2 IN OBESE (HCC): ICD-10-CM

## 2023-05-04 DIAGNOSIS — E11.9 DIABETES MELLITUS (HCC): ICD-10-CM

## 2023-05-04 DIAGNOSIS — Z99.89 OBSTRUCTIVE SLEEP APNEA TREATED WITH CONTINUOUS POSITIVE AIRWAY PRESSURE (CPAP): ICD-10-CM

## 2023-05-04 DIAGNOSIS — Z98.84 STATUS POST LAPAROSCOPIC SLEEVE GASTRECTOMY: ICD-10-CM

## 2023-05-04 NOTE — PROGRESS NOTES
Assessment/Plan:     Obesity, Class II, BMI 35-39 9  - S/P lap sleeve gastrectomy on 10/21/19 by Dr Krista Mohr   -Patient is pursuing Conservative Program  -Initial weight loss goal of 5-10% weight loss for improved health  -Screening labs 10/22/22  - check A1C and CMP  Initial:228 2  Last visit: 220 lbs  Current: 216 1 lbs BMI 35 98  Change:-1 6 from initial(-8 lb from last OV)    Goals:  -Get protein each meal   - Follow 30/60 rule  - Gradually increase elliptical to goal of 5 days per week for 30 minutes  - keep up the great work with water intake  Increase Ozempic from 0 5 mg weekly to 1 mg weekly for diabetes and help with weight loss  Tolerating well and helping with appetite  Start dose 234 6 lb on 11/8/22  Patient denies personal and family history of MCT and MEN2 tumors  Patient denies personal history of pancreatitis  - S/P hysterectomy    Status post laparoscopic sleeve gastrectomy  S/P lap sleeve gastrectomy on 10/21/19 by Dr Krista Mohr  Postsurgical malabsorption  - Scheduled to see surgical adal June 2023  GERD (gastroesophageal reflux disease)  - Taking prilosec  May improve with weight loss and lifestyle modification  Continue management with prescribing provider  Obstructive sleep apnea treated with continuous positive airway pressure (CPAP)  - Continue regular use of CPAP  Diabetes mellitus ([facility] [address] )  - Taking Ozempic  May improve with weight loss and lifestyle modification  Continue management with prescribing provider  Lab Results   Component Value Date    HGBA1C 7 0 (H) 10/22/2022       Essential hypertension  - Taking norvasc and losartan  May improve with weight loss and lifestyle modification  Continue management with prescribing provider  Bipolar affective disorder ([facility] [address] )  - Taking effexor and xanax as needed  May improve with weight loss and lifestyle modification  Continue management with prescribing provider                Liliana Bergman was seen today for follow-up  Diagnoses and all orders for this visit:    Obesity, Class II, BMI 35-39 9  -     semaglutide, 1 mg/dose, (Ozempic, 1 MG/DOSE,) 4 mg/3 mL injection pen; Inject 0 75 mL (1 mg total) under the skin every 7 days  -     HEMOGLOBIN A1C W/ EAG ESTIMATION; Future  -     Comprehensive metabolic panel; Future    Diabetes mellitus type 2 in obese (HCC)  -     semaglutide, 1 mg/dose, (Ozempic, 1 MG/DOSE,) 4 mg/3 mL injection pen; Inject 0 75 mL (1 mg total) under the skin every 7 days  -     HEMOGLOBIN A1C W/ EAG ESTIMATION; Future  -     Comprehensive metabolic panel; Future    Status post laparoscopic sleeve gastrectomy    Postsurgical malabsorption    Gastroesophageal reflux disease, unspecified whether esophagitis present    Diabetes mellitus (HCC)    Obstructive sleep apnea treated with continuous positive airway pressure (CPAP)    Essential hypertension    Bipolar affective disorder, remission status unspecified ([facility] )          Follow up in approximately 3 months with Non-Surgical Physician/Advanced Practitioner  Subjective:   Chief Complaint   Patient presents with    Follow-up     MWM 2mth f/u; waist 43in       Patient ID: Esequiel Balderas  is a 56 y o  female with excess weight/obesity here to pursue weight management  Patient is pursuing Conservative Program    Most recent notes and records were reviewed  HPI    Wt Readings from Last 10 Encounters:   05/04/23 98 1 kg (216 lb 3 2 oz)   03/27/23 99 8 kg (220 lb)   02/22/23 99 8 kg (220 lb)   12/31/22 101 kg (223 lb)   12/24/22 101 kg (223 lb)   12/21/22 103 kg (226 lb 9 6 oz)   11/08/22 106 kg (234 lb 9 6 oz)   11/03/22 107 kg (236 lb 8 oz)   10/31/22 106 kg (234 lb)   10/19/22 102 kg (225 lb)     S/P lap sleeve gastrectomy on 10/21/19 by Dr Shelly Blake  Weight prior to surgery was 270 7 lbs and mary 185 lbs  Following with MWM for weight loss to help with GERD  Follows with my colleague Cesilia Lemon PA-C   Last saw Maxine Fong PA-C on 2/22/2023 in MWM follow-up  Taking Ozempic 0 5 mg weekly  No negative side effects  Helping with appetite, but she would like to increase dose further  Not food logging  Tries to follow the 30/60 rule  B- protein bar or shake or crack an egg cup  S- none  L 1-2 pm - salad with chicken and cheese or dejesus or steak   S- none or protein bar or fruit  D- small piece of pizza or steak or chicken and veggies  S- none or SF ice pops or candy    Hydration- 80 oz water, 3 cups coffee with small amount of half half, shake 100 calories per day  Alcohol- none  Tobacco- denies  Exercise- elliptical 3-4 days per week 20-30 minutes   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   Sleep- 7-8 hours, recently restarted CPAP    Colonoscopy: has clementina, encouraged to complete   Mammogram: DALY, gets at [facility]      The following portions of the patient's history were reviewed and updated as appropriate: allergies, current medications, past family history, past medical history, past social history, past surgical history, and problem list     Family History   Problem Relation Age of Onset    Breast cancer Mother     Hypertension Mother     Brain cancer Mother     Prostate cancer Father     Melanoma Father     Diabetes Father     Hypertension Father     Heart disease Father     Skin cancer Father     Thyroid disease Neg Hx     Stroke Neg Hx         Review of Systems   HENT: Negative for sore throat  Respiratory: Negative for cough and shortness of breath  Cardiovascular: Negative for chest pain and palpitations  Gastrointestinal: Positive for constipation (intermittent)  Negative for diarrhea, nausea and vomiting         + GERD taking medication  Musculoskeletal: Positive for arthralgias (knees)  Negative for back pain  Skin: Negative for rash  Psychiatric/Behavioral: Negative for suicidal ideas (or HI)          + depression and anxiety controlled with medication       Objective:  /60 " Pulse 70   Resp 16   Ht 5' 5\" (1 651 m)   Wt 98 1 kg (216 lb 3 2 oz)   BMI 35 98 kg/m²     Physical Exam  Vitals and nursing note reviewed  Constitutional   General appearance: Abnormal   well developed and obese  Eyes No conjunctival injection  Ears, Nose, Mouth, and Throat Oral mucosa moist    Pulmonary   Respiratory effort: No increased work of breathing or signs of respiratory distress  Cardiovascular     Examination of extremities for edema and/or varicosities: Normal   no edema  Abdomen   Abdomen: Abnormal   The abdomen was obese      Musculoskeletal   Normal range of motion  Neurological   Gait and station: Normal     Psychiatric   Orientation to person, place and time: Normal     Affect: appropriate      "

## 2023-05-04 NOTE — ASSESSMENT & PLAN NOTE
- Taking prilosec  May improve with weight loss and lifestyle modification  Continue management with prescribing provider

## 2023-05-04 NOTE — ASSESSMENT & PLAN NOTE
- Taking effexor and xanax as needed  May improve with weight loss and lifestyle modification  Continue management with prescribing provider

## 2023-05-04 NOTE — ASSESSMENT & PLAN NOTE
- Taking norvasc and losartan  May improve with weight loss and lifestyle modification  Continue management with prescribing provider

## 2023-05-04 NOTE — ASSESSMENT & PLAN NOTE
- S/P lap sleeve gastrectomy on 10/21/19 by Dr Kelsey Rosa   -Patient is pursuing Conservative Program  -Initial weight loss goal of 5-10% weight loss for improved health  -Screening labs 10/22/22  - check A1C and CMP  Initial:228 2  Last visit: 220 lbs  Current: 216 1 lbs BMI 35 98  Change:-1 6 from initial(-8 lb from last OV)    Goals:  -Get protein each meal   - Follow 30/60 rule  - Gradually increase elliptical to goal of 5 days per week for 30 minutes  - keep up the great work with water intake  Increase Ozempic from 0 5 mg weekly to 1 mg weekly for diabetes and help with weight loss  Tolerating well and helping with appetite  Start dose 234 6 lb on 11/8/22  Patient denies personal and family history of MCT and MEN2 tumors  Patient denies personal history of pancreatitis     - S/P hysterectomy

## 2023-05-04 NOTE — ASSESSMENT & PLAN NOTE
- Taking Ozempic  May improve with weight loss and lifestyle modification  Continue management with prescribing provider         Lab Results   Component Value Date    HGBA1C 7 0 (H) 10/22/2022

## 2023-06-16 ENCOUNTER — OFFICE VISIT (OUTPATIENT)
Dept: BARIATRICS | Facility: CLINIC | Age: 58
End: 2023-06-16
Payer: COMMERCIAL

## 2023-06-16 VITALS
BODY MASS INDEX: 35.59 KG/M2 | HEART RATE: 60 BPM | DIASTOLIC BLOOD PRESSURE: 74 MMHG | SYSTOLIC BLOOD PRESSURE: 110 MMHG | WEIGHT: 208.5 LBS | TEMPERATURE: 97.5 F | HEIGHT: 64 IN

## 2023-06-16 DIAGNOSIS — Z98.84 BARIATRIC SURGERY STATUS: ICD-10-CM

## 2023-06-16 DIAGNOSIS — F31.9 BIPOLAR AFFECTIVE DISORDER, REMISSION STATUS UNSPECIFIED (HCC): ICD-10-CM

## 2023-06-16 DIAGNOSIS — K91.2 POSTSURGICAL MALABSORPTION: ICD-10-CM

## 2023-06-16 DIAGNOSIS — Z48.815 ENCOUNTER FOR SURGICAL AFTERCARE FOLLOWING SURGERY OF DIGESTIVE SYSTEM: Primary | ICD-10-CM

## 2023-06-16 DIAGNOSIS — I10 HTN (HYPERTENSION): ICD-10-CM

## 2023-06-16 DIAGNOSIS — E66.9 OBESITY, CLASS II, BMI 35-39.9: ICD-10-CM

## 2023-06-16 PROCEDURE — 99213 OFFICE O/P EST LOW 20 MIN: CPT | Performed by: PHYSICIAN ASSISTANT

## 2023-06-16 NOTE — PROGRESS NOTES
Assessment/Plan:     Patient ID: Maggie Diggs is a 56 y o  female  Bariatric Surgery Status    s/p Vertical [facility] Files Dr Ashish Lebron 10/21/2019  Here for annual  overall doing well  She has been following with our MWM program and doing very well on ozempic         She still has some reflux but overall improved since last visit  She is taking PPI only once daily  Last EGD was 10/2022 showing:     IMPRESSION:  LA grade A esophagitis  Gastritis  Moderate amount of bile refluxing into the body of the stomach  Multiple benign-looking gastric polyps      She states she currently want to hold off on considering a revision at this time as the ozempic has been working well  Advised PPI BID       HTN  - continue antihypertensives     · Continued/Maintain healthy weight loss with good nutrition intakes  · Adequate hydration with at least 64oz  fluid intake  · Follow diet as discussed  · Follow vitamin and mineral recommendations as reviewed with you  · Exercise as tolerated  · Colonoscopy referral made: has cologuard she needs to complete  · Mammo: utd    · Follow-up in 1 year  We kindly ask that your arrive 15 minutes before your scheduled appointment time with your provider to allow our staff to room you, get your vital signs and update your chart  · Get lab work done  Please call the office if you need a script  It is recommended to check with your insurance BEFORE getting labs done to make sure they are covered by your policy  · Call our office if you have any problems with abdominal pain especially associated with fever, chills, nausea, vomiting or any other concerns  · All  Post-bariatric surgery patients should be aware that very small quantities of any alcohol can cause impairment and it is very possible not to feel the effect  The effect can be in the system for several hours  It is also a stomach irritant       · It is advised to AVOID alcohol, Nonsteroidal antiinflammatory drugs (NSAIDS) and nicotine of all forms   Any of these can cause stomach irritation/pain  · Discussed the effects of alcohol on a bariatric patient and the increased impairment risk  · Keep up the good work! Postsurgical Malabsorption   -At risk for malabsorption of vitamins/minerals secondary to malabsorption and restriction of intake from bariatric surgery  -Currently taking adequate postop bariatric surgery vitamin supplementation  -Last set of bariatric labs completed 10/2022  -Next set of bariatric labs ordered for approximately 2 months  -Patient received education about the importance of adhering to a lifelong supplementation regimen to avoid vitamin/mineral deficiencies      Diagnoses and all orders for this visit:    Encounter for surgical aftercare following surgery of digestive system  -     Zinc; Future  -     Vitamin D 25 hydroxy; Future  -     Vitamin B12; Future  -     Vitamin A; Future  -     Vitamin B1, whole blood; Future  -     PTH, intact; Future  -     CBC and Platelet; Future  -     Ferritin; Future  -     Folate; Future  -     Iron Saturation %; Future    Bariatric surgery status  -     Zinc; Future  -     Vitamin D 25 hydroxy; Future  -     Vitamin B12; Future  -     Vitamin A; Future  -     Vitamin B1, whole blood; Future  -     PTH, intact; Future  -     CBC and Platelet; Future  -     Ferritin; Future  -     Folate; Future  -     Iron Saturation %; Future    Postsurgical malabsorption  -     Zinc; Future  -     Vitamin D 25 hydroxy; Future  -     Vitamin B12; Future  -     Vitamin A; Future  -     Vitamin B1, whole blood; Future  -     PTH, intact; Future  -     CBC and Platelet; Future  -     Ferritin; Future  -     Folate; Future  -     Iron Saturation %; Future    Obesity, Class II, BMI 35-39 9  -     Zinc; Future  -     Vitamin D 25 hydroxy; Future  -     Vitamin B12; Future  -     Vitamin A; Future  -     Vitamin B1, whole blood; Future  -     PTH, intact;  Future  -     CBC and Platelet; Future  -     Ferritin; Future  -     Folate; Future  -     Iron Saturation %; Future    Bipolar affective disorder, remission status unspecified (HCC)  -     Zinc; Future  -     Vitamin D 25 hydroxy; Future  -     Vitamin B12; Future  -     Vitamin A; Future  -     Vitamin B1, whole blood; Future  -     PTH, intact; Future  -     CBC and Platelet; Future  -     Ferritin; Future  -     Folate; Future  -     Iron Saturation %; Future    HTN (hypertension)  -     Zinc; Future  -     Vitamin D 25 hydroxy; Future  -     Vitamin B12; Future  -     Vitamin A; Future  -     Vitamin B1, whole blood; Future  -     PTH, intact; Future  -     CBC and Platelet; Future  -     Ferritin; Future  -     Folate; Future  -     Iron Saturation %; Future         Subjective:      Patient ID: Rosey Jeffery is a 56 y o  female  s/p Vertical Nicolas Palm 10/21/2019  Here for annual  overall doing well    Current: 208  Current BMI is Body mass index is 35 79 kg/m²  · Tolerating a regular diet-yes  · Eating at least 60 grams of protein per day-yes  · Following 30/60 minute rule with liquids-30/30  · Drinking at least 64 ounces of fluid per day-yes  · Drinking carbonated beverages-no  · Sufficient exercise-elliptical daily   · Using NSAIDs regularly-no  · Using nicotine-no  · Using alcohol-no  · Supplements: francis mvi + calcium    The following portions of the patient's history were reviewed and updated as appropriate: allergies, current medications, past family history, past medical history, past social history, past surgical history and problem list     Review of Systems   Constitutional: Negative  Respiratory: Negative  Cardiovascular: Negative  Gastrointestinal: Positive for constipation (improved with fiber gummies )  Negative for abdominal pain, diarrhea, nausea and vomiting  Neurological: Negative  Psychiatric/Behavioral: Negative            Objective:    /74   Pulse 60   Temp "97 5 °F (36 4 °C) (Tympanic)   Ht 5' 4\" (1 626 m)   Wt 94 6 kg (208 lb 8 oz)   BMI 35 79 kg/m²      Physical Exam  Vitals and nursing note reviewed  Constitutional:       Appearance: Normal appearance  She is obese  HENT:      Head: Normocephalic and atraumatic  Eyes:      Extraocular Movements: Extraocular movements intact  Pupils: Pupils are equal, round, and reactive to light  Cardiovascular:      Rate and Rhythm: Normal rate and regular rhythm  Pulmonary:      Effort: Pulmonary effort is normal       Breath sounds: Normal breath sounds  Abdominal:      General: Bowel sounds are normal       Tenderness: There is no abdominal tenderness  Musculoskeletal:         General: Normal range of motion  Cervical back: Normal range of motion  Skin:     General: Skin is warm and dry  Neurological:      General: No focal deficit present  Mental Status: She is alert and oriented to person, place, and time     Psychiatric:         Mood and Affect: Mood normal              "

## 2023-08-09 ENCOUNTER — OFFICE VISIT (OUTPATIENT)
Dept: BARIATRICS | Facility: CLINIC | Age: 58
End: 2023-08-09
Payer: COMMERCIAL

## 2023-08-09 VITALS
WEIGHT: 197 LBS | DIASTOLIC BLOOD PRESSURE: 70 MMHG | HEART RATE: 66 BPM | SYSTOLIC BLOOD PRESSURE: 101 MMHG | HEIGHT: 64 IN | BODY MASS INDEX: 33.63 KG/M2

## 2023-08-09 DIAGNOSIS — K21.9 GASTROESOPHAGEAL REFLUX DISEASE, UNSPECIFIED WHETHER ESOPHAGITIS PRESENT: ICD-10-CM

## 2023-08-09 DIAGNOSIS — E11.8 TYPE 2 DIABETES MELLITUS WITH COMPLICATION (HCC): ICD-10-CM

## 2023-08-09 DIAGNOSIS — E66.9 OBESITY, CLASS II, BMI 35-39.9: ICD-10-CM

## 2023-08-09 DIAGNOSIS — K91.2 POSTSURGICAL MALABSORPTION: Chronic | ICD-10-CM

## 2023-08-09 DIAGNOSIS — I10 ESSENTIAL HYPERTENSION: ICD-10-CM

## 2023-08-09 DIAGNOSIS — E66.9 DIABETES MELLITUS TYPE 2 IN OBESE (HCC): ICD-10-CM

## 2023-08-09 DIAGNOSIS — E11.69 DIABETES MELLITUS TYPE 2 IN OBESE (HCC): ICD-10-CM

## 2023-08-09 DIAGNOSIS — E78.2 MIXED HYPERLIPIDEMIA: ICD-10-CM

## 2023-08-09 DIAGNOSIS — E66.9 OBESITY, CLASS I, BMI 30-34.9: Primary | ICD-10-CM

## 2023-08-09 PROCEDURE — 99214 OFFICE O/P EST MOD 30 MIN: CPT | Performed by: PHYSICIAN ASSISTANT

## 2023-08-09 NOTE — PROGRESS NOTES
Assessment/Plan:    Obesity, Class I, BMI 30-34.9  - S/P lap sleeve gastrectomy on 10/21/19 by Dr. Migdalia Harris.  -Patient is pursuing Conservative Program  -Initial weight loss goal of 5-10% weight loss for improved health  -Screening labs 10/22/22 due for recheck of CMP, A1C    Initial:228.2  Last visit: 216.4  Current: 197  Change:-31.2 from initial(-11.8 lb from last OV)    Goals:  -continue protein intake  -continue elliptical for exercise  - keep up the great work with water intake. To continue on ozempic 1mg. . Tolerating well and helping with appetite. Start dose 234.6 lb on 11/8/22. Patient denies personal and family history of MCT and MEN2 tumors. Patient denies personal history of pancreatitis. - S/P hysterectomy    Postsurgical malabsorption  Taking vitamin supplementations. Labs ordered by surgical WM    GERD (gastroesophageal reflux disease)  On omeprazole. Discussed making sure to take it BID    Type 2 diabetes mellitus with complication (HCC)    Lab Results   Component Value Date    HGBA1C 7.0 (H) 10/22/2022   doing well on ozempic 1mg. Has labs for reorder of A1C    Essential hypertension  BP controlled. Plans on scheduling follow up with PCP in the future    Mixed hyperlipidemia  -should improve with weight loss, dietary, and lifestyle changes          Follow up in approximately 4 months with Non-Surgical Physician/Advanced Practitioner. Diagnoses and all orders for this visit:    Obesity, Class I, BMI 30-34.9    Obesity, Class II, BMI 35-39.9  -     Discontinue: semaglutide, 1 mg/dose, (Ozempic, 1 MG/DOSE,) 4 mg/3 mL injection pen; Inject 0.75 mL (1 mg total) under the skin every 7 days  -     semaglutide, 1 mg/dose, (Ozempic, 1 MG/DOSE,) 4 mg/3 mL injection pen; Inject 0.75 mL (1 mg total) under the skin every 7 days    Diabetes mellitus type 2 in obese (HCC)  -     Discontinue: semaglutide, 1 mg/dose, (Ozempic, 1 MG/DOSE,) 4 mg/3 mL injection pen;  Inject 0.75 mL (1 mg total) under the skin every 7 days  -     semaglutide, 1 mg/dose, (Ozempic, 1 MG/DOSE,) 4 mg/3 mL injection pen; Inject 0.75 mL (1 mg total) under the skin every 7 days    Postsurgical malabsorption    Gastroesophageal reflux disease, unspecified whether esophagitis present    Type 2 diabetes mellitus with complication (HCC)    Essential hypertension    Mixed hyperlipidemia          Subjective:   Chief Complaint   Patient presents with   • Follow-up     MWM 3mf/u; waist-41.5in        Patient ID: Viola Garcia  is a 56 y.o. female with excess weight/obesity here to pursue weight managment. Patient is pursuing Conservative Program.     HPI   Here for 3 month followup. On ozempic 1mg. Denies any side effects. She does check her blood sugar and fasting is 110-120. Reflux has improved.  Certain foods do trigger her and if eating too close to bed  Wt Readings from Last 10 Encounters:   08/09/23 89.4 kg (197 lb)   06/16/23 94.6 kg (208 lb 8 oz)   05/04/23 98.1 kg (216 lb 3.2 oz)   03/27/23 99.8 kg (220 lb)   02/22/23 99.8 kg (220 lb)   12/31/22 101 kg (223 lb)   12/24/22 101 kg (223 lb)   12/21/22 103 kg (226 lb 9.6 oz)   11/08/22 106 kg (234 lb 9.6 oz)   11/03/22 107 kg (236 lb 8 oz)       Food logging:  Increased appetite/cravings:  Fruit/Vegetable servings:  Exercise:elipital a few times a week  Hydration:water at least 80 oz a day, 3 cups of coffee w/small amount of less than 1 teaspoon  Half and half, protein water, 1 protein shake a day and protein hot chocolate        The following portions of the patient's history were reviewed and updated as appropriate:   She  has a past medical history of Acute bronchitis, Anxiety, Arthritis, Asthma, Bariatric surgery status, Continuous positive airway pressure dependence, COVID-19 vaccine series completed (04/25/2021), CPAP (continuous positive airway pressure) dependence, Depression, Diabetes mellitus, GERD (gastroesophageal reflux disease), Hyperlipidemia, Hypertension, Morbid obesity, Postsurgical malabsorption, and Sleep apnea. She   Patient Active Problem List    Diagnosis Date Noted   • Status post laparoscopic sleeve gastrectomy 2022   • Obesity, Class I, BMI 30-34.9 2020   • Encounter for surgical aftercare following surgery of digestive system 2020   • GERD (gastroesophageal reflux disease) 2020   • Obesity, Class II, BMI 35-39.9 2020   • Bariatric surgery status 2020   • Postsurgical malabsorption 2020   • Type 2 diabetes mellitus with complication ([address])    • Sleep apnea 2019   • Preprocedural cardiovascular examination 2019   • Knee pain 2018   • Obstructive sleep apnea treated with continuous positive airway pressure (CPAP)    • Diabetes mellitus ([address]) 2018   • Essential hypertension 2018   • Depression 2018   • Obesity, Class III, BMI 40-49.9 (morbid obesity) ([address]) 2018   • Diabetes mellitus, new onset ([address]) 5069   • Dysmetabolic syndrome X    • Mixed hyperlipidemia 01/10/2011   • Bipolar affective disorder ([address]) 2010   • Extrinsic asthma 2010     She  has a past surgical history that includes Cholecystectomy; Hysterectomy; Knee arthroscopy (Bilateral);  section; EGD; [place] tooth extraction; Breast surgery; and pr laps gstrc rstrictiv px longitudinal gastrectomy (N/A, 10/21/2019). Her family history includes Brain cancer in her mother; Breast cancer in her mother; Diabetes in her father; Heart disease in her father; Hypertension in her father and mother; Melanoma in her father; Prostate cancer in her father; Skin cancer in her father. She  reports that she has never smoked. She has never used smokeless tobacco. She reports that she does not currently use alcohol. She reports that she does not use drugs.   Current Outpatient Medications   Medication Sig Dispense Refill   • albuterol (PROVENTIL HFA,VENTOLIN HFA) 90 mcg/act inhaler Inhale 2 puffs every 4 (four) hours as needed     • ALPRAZolam (XANAX) 0.25 mg tablet Take 0.25 mg by mouth daily at bedtime as needed     • amLODIPine (NORVASC) 10 mg tablet Take 10 mg by mouth daily     • chlorthalidone 25 mg tablet      • diclofenac sodium (VOLTAREN) 1 % Apply 2 g topically 3 (three) times a day     • glucose blood test strip      • lamoTRIgine (LaMICtal) 200 MG tablet Take 200 mg by mouth daily      • losartan (COZAAR) 100 MG tablet      • omeprazole (PriLOSEC) 20 mg delayed release capsule Take 1 capsule (20 mg total) by mouth 2 (two) times a day 180 capsule 2   • semaglutide, 1 mg/dose, (Ozempic, 1 MG/DOSE,) 4 mg/3 mL injection pen Inject 0.75 mL (1 mg total) under the skin every 7 days 9 mL 1   • Sodium Hyaluronate 20 MG/2ML SOSY      • venlafaxine (EFFEXOR-XR) 150 mg 24 hr capsule      • venlafaxine (EFFEXOR-XR) 75 mg 24 hr capsule Take 75 mg by mouth daily       No current facility-administered medications for this visit.      Current Outpatient Medications on File Prior to Visit   Medication Sig   • albuterol (PROVENTIL HFA,VENTOLIN HFA) 90 mcg/act inhaler Inhale 2 puffs every 4 (four) hours as needed   • ALPRAZolam (XANAX) 0.25 mg tablet Take 0.25 mg by mouth daily at bedtime as needed   • amLODIPine (NORVASC) 10 mg tablet Take 10 mg by mouth daily   • chlorthalidone 25 mg tablet    • diclofenac sodium (VOLTAREN) 1 % Apply 2 g topically 3 (three) times a day   • glucose blood test strip    • lamoTRIgine (LaMICtal) 200 MG tablet Take 200 mg by mouth daily    • losartan (COZAAR) 100 MG tablet    • omeprazole (PriLOSEC) 20 mg delayed release capsule Take 1 capsule (20 mg total) by mouth 2 (two) times a day   • Sodium Hyaluronate 20 MG/2ML SOSY    • venlafaxine (EFFEXOR-XR) 150 mg 24 hr capsule    • venlafaxine (EFFEXOR-XR) 75 mg 24 hr capsule Take 75 mg by mouth daily   • [DISCONTINUED] semaglutide, 1 mg/dose, (Ozempic, 1 MG/DOSE,) 4 mg/3 mL injection pen Inject 0.75 mL (1 mg total) under the skin every 7 days No current facility-administered medications on file prior to visit. She is allergic to amoxicillin, amoxicillin-pot clavulanate, oxycodone-acetaminophen, sulfa antibiotics, erythromycin, and wound dressings. .    Review of Systems   Constitutional: Negative for fever. Respiratory: Negative for shortness of breath. Cardiovascular: Negative for chest pain and palpitations. Gastrointestinal: Negative for abdominal pain, constipation, diarrhea and vomiting. +GERD   Genitourinary: Negative for difficulty urinating. Musculoskeletal: Positive for arthralgias. Negative for back pain. Skin: Negative for rash. Neurological: Negative for headaches. Psychiatric/Behavioral: Negative for dysphoric mood. The patient is not nervous/anxious. Objective:    /70   Pulse 66   Ht 5' 4" (1.626 m)   Wt 89.4 kg (197 lb)   BMI 33.81 kg/m²      Physical Exam  Vitals and nursing note reviewed. Constitutional:       General: She is not in acute distress. Appearance: She is well-developed. She is obese. HENT:      Head: Normocephalic and atraumatic. Eyes:      Conjunctiva/sclera: Conjunctivae normal.   Neck:      Thyroid: No thyromegaly. Pulmonary:      Effort: Pulmonary effort is normal. No respiratory distress. Skin:     Findings: No rash (visible). Neurological:      Mental Status: She is alert and oriented to person, place, and time.    Psychiatric:         Mood and Affect: Mood normal.         Behavior: Behavior normal.

## 2023-08-09 NOTE — ASSESSMENT & PLAN NOTE
Lab Results   Component Value Date    HGBA1C 7.0 (H) 10/22/2022   doing well on ozempic 1mg.   Has labs for reorder of A1C

## 2023-08-09 NOTE — ASSESSMENT & PLAN NOTE
- S/P lap sleeve gastrectomy on 10/21/19 by Dr. Bee Delgado.  -Patient is pursuing Conservative Program  -Initial weight loss goal of 5-10% weight loss for improved health  -Screening labs 10/22/22 due for recheck of CMP, A1C    Initial:228.2  Last visit: 216.4  Current: 197  Change:-31.2 from initial(-11.8 lb from last OV)    Goals:  -continue protein intake  -continue elliptical for exercise  - keep up the great work with water intake. To continue on ozempic 1mg. . Tolerating well and helping with appetite. Start dose 234.6 lb on 11/8/22. Patient denies personal and family history of MCT and MEN2 tumors. Patient denies personal history of pancreatitis.    - S/P hysterectomy

## 2023-09-01 ENCOUNTER — TELEPHONE (OUTPATIENT)
Dept: BARIATRICS | Facility: CLINIC | Age: 58
End: 2023-09-01

## 2023-09-01 LAB — HBA1C MFR BLD HPLC: 6 %

## 2023-09-01 NOTE — TELEPHONE ENCOUNTER
----- Message from Farheen Joy PA-C sent at 9/1/2023 10:17 AM EDT -----  Regarding: RE: Iron saturation test  Please let them know to draw the labs that were ordered, thank you!!      ----- Message -----  From: Bunny Vazquez  Sent: 9/1/2023  10:06 AM EDT  To: Farheen Joy PA-C  Subject: Iron saturation test                             [facility] called stating that the Iron Saturation test requires other components to be tested. They wanted to clarify that you wanted the entire profile done doing the blood work. Please call [phone] regarding this issue.

## 2023-09-15 DIAGNOSIS — E11.69 DIABETES MELLITUS TYPE 2 IN OBESE: ICD-10-CM

## 2023-09-15 DIAGNOSIS — E66.9 DIABETES MELLITUS TYPE 2 IN OBESE: ICD-10-CM

## 2023-09-15 DIAGNOSIS — E66.9 OBESITY, CLASS II, BMI 35-39.9: ICD-10-CM

## 2023-09-15 RX ORDER — SEMAGLUTIDE 1.34 MG/ML
INJECTION, SOLUTION SUBCUTANEOUS
Refills: 1 | OUTPATIENT
Start: 2023-09-15

## 2023-09-15 NOTE — TELEPHONE ENCOUNTER
Can you see if she can contact other pharmacies to find the ozempic.   I haven't had a problem with this recently

## 2023-10-04 ENCOUNTER — OFFICE VISIT (OUTPATIENT)
Dept: URGENT CARE | Facility: CLINIC | Age: 58
End: 2023-10-04
Payer: COMMERCIAL

## 2023-10-04 VITALS
WEIGHT: 197 LBS | HEIGHT: 64 IN | BODY MASS INDEX: 33.63 KG/M2 | RESPIRATION RATE: 18 BRPM | OXYGEN SATURATION: 100 % | SYSTOLIC BLOOD PRESSURE: 126 MMHG | TEMPERATURE: 98.3 F | DIASTOLIC BLOOD PRESSURE: 84 MMHG | HEART RATE: 63 BPM

## 2023-10-04 DIAGNOSIS — B34.9 VIRAL ILLNESS: Primary | ICD-10-CM

## 2023-10-04 PROCEDURE — 99213 OFFICE O/P EST LOW 20 MIN: CPT | Performed by: PHYSICIAN ASSISTANT

## 2023-10-04 RX ORDER — OSELTAMIVIR PHOSPHATE 75 MG/1
75 CAPSULE ORAL 2 TIMES DAILY
Qty: 10 CAPSULE | Refills: 0 | Status: SHIPPED | OUTPATIENT
Start: 2023-10-04 | End: 2023-10-09

## 2023-10-04 NOTE — LETTER
October 4, 2023     Patient: Lito Nur  YOB: 1965  Date of Visit: 10/4/2023      To Whom it May Concern:    Lito Nur is under my professional care. Arielle Grady was seen in my office on 10/4/2023. Arielle Grady may return to work when fever free for 24 hours without fever reducer. If you have any questions or concerns, please don't hesitate to call.          Sincerely,          Anurag Granados PA-C        CC: No Recipients

## 2023-10-04 NOTE — PROGRESS NOTES
[facility]      NAME: Darien Sarmiento is a 56 y.o. female  : 1965    MRN: [id number]  DATE: 2023  TIME: 1:51 PM    Assessment and Plan   Viral illness [B34.9]  1. Viral illness  oseltamivir (TAMIFLU) 75 mg capsule          Patient Instructions      Influenza   AMBULATORY CARE:   Influenza  (the flu) is an infection caused by the influenza virus. The flu is easily spread when an infected person coughs, sneezes, or has close contact with others. You may be able to spread the flu to others for 1 week or longer after signs or symptoms appear. Common signs and symptoms include the following:   • Fever and chills     • Headaches, body aches, and muscle or joint pain     • Cough, runny nose, and sore throat     • Loss of appetite, nausea, vomiting, or diarrhea     • Tiredness     • Trouble breathing     Call your local emergency number (911 in the [address]) if:   • You have trouble breathing, and your lips look purple or blue.     • You have a seizure.     Seek care immediately if:   • You are dizzy, or you are urinating less or not at all.      • You have a headache with a stiff neck, and you feel tired or confused.     • You have new pain or pressure in your chest.     • Your symptoms, such as shortness of breath, vomiting, or diarrhea, get worse.      • Your symptoms, such as fever and coughing, seem to get better, but then get worse.     Call your doctor if:   • You have new muscle pain or weakness.     • You have questions or concerns about your condition or care.     Treatment for influenza  may include any of the following:  • Acetaminophen  decreases pain and fever. It is available without a doctor's order. Ask how much to take and how often to take it. Follow directions.  Read the labels of all other medicines you are using to see if they also contain acetaminophen, or ask your doctor or pharmacist. Acetaminophen can cause liver damage if not taken correctly.     • NSAIDs , such as ibuprofen, help decrease swelling, pain, and fever. This medicine is available with or without a doctor's order. NSAIDs can cause stomach bleeding or kidney problems in certain people. If you take blood thinner medicine, always ask your healthcare provider if NSAIDs are safe for you. Always read the medicine label and follow directions.     • Antivirals  help fight a viral infection.     Manage your symptoms:   • Rest  as much as you can to help you recover.     • Drink liquids as directed  to help prevent dehydration. Ask how much liquid to drink each day and which liquids are best for you.     Prevent the spread of germs:     •   Wash your hands often. Wash your hands several times each day. Wash after you use the bathroom, change a child's diaper, and before you prepare or eat food. Use soap and water every time. Rub your soapy hands together, lacing your fingers. Wash the front and back of your hands, and in between your fingers. Use the fingers of one hand to scrub under the fingernails of the other hand. Wash for at least 20 seconds. Rinse with warm, running water for several seconds. Then dry your hands with a clean towel or paper towel. Use hand  that contains alcohol if soap and water are not available. Do not touch your eyes, nose, or mouth without washing your hands first.          • Cover a sneeze or cough. Use a tissue that covers your mouth and nose. Throw the tissue away in a trash can right away. Use the bend of your arm if a tissue is not available. Wash your hands well with soap and water or use a hand .     • Stay away from others while you are sick. Avoid crowds as much as possible.     • Ask about vaccines you may need. Talk to your healthcare provider about your vaccine history. He or she will tell you which vaccines you need, and when to get them.     ? Get the influenza (flu) vaccine as soon as it is available.   Flu viruses change, so it is important to get a flu vaccine every year.     ? Get the pneumonia vaccine if recommended. This vaccine is usually recommended every 5 years. Your provider will tell you when to get this vaccine, if needed. Follow up with your doctor as directed:  Write down your questions so you remember to ask them during your visits. © Copyright [employer] 2023 Information is for End User's use only and may not be sold, redistributed or otherwise used for commercial purposes. The above information is an  only. It is not intended as medical advice for individual conditions or treatments. Talk to your doctor, nurse or pharmacist before following any medical regimen to see if it is safe and effective for you.          To present to the ER if symptoms worsen. Chief Complaint     Chief Complaint   Patient presents with   • Cold Like Symptoms     Patient c/o fever, sore throat and body aches that started on Saturday. History of Present Illness   Rikki Huggins presents to the clinic c/o    Generalized Body Aches  Episode onset: 9/30. The problem occurs constantly. The problem is unchanged. Associated symptoms include congestion, a sore throat, swollen glands, fatigue, a fever, wheezing, diarrhea (since resolved) and muscle aches. Pertinent negatives include no ear discharge, ear pain, eye discharge, eye itching, eye pain, eye redness, headaches, photophobia, chest pain, coughing, shortness of breath, abdominal pain or rash. Past treatments include one or more OTC medications. The treatment provided no relief. - at home covid test    Review of Systems   Review of Systems   Constitutional: Positive for fatigue and fever. Negative for chills and diaphoresis. HENT: Positive for congestion and sore throat. Negative for ear discharge, ear pain and facial swelling. Eyes: Negative for photophobia, pain, discharge, redness, itching and visual disturbance. Respiratory: Positive for chest tightness and wheezing.  Negative for apnea, cough and shortness of breath. Cardiovascular: Negative for chest pain and palpitations. Gastrointestinal: Positive for diarrhea (since resolved). Negative for abdominal pain. Musculoskeletal: Positive for myalgias. Skin: Negative for color change, rash and wound. Neurological: Negative for dizziness and headaches. Hematological: Negative for adenopathy.          Current Medications     Long-Term Medications   Medication Sig Dispense Refill   • ALPRAZolam (XANAX) 0.25 mg tablet Take 0.25 mg by mouth daily at bedtime as needed     • chlorthalidone 25 mg tablet      • diclofenac sodium (VOLTAREN) 1 % Apply 2 g topically 3 (three) times a day     • lamoTRIgine (LaMICtal) 200 MG tablet Take 200 mg by mouth daily      • losartan (COZAAR) 100 MG tablet      • omeprazole (PriLOSEC) 20 mg delayed release capsule Take 1 capsule (20 mg total) by mouth 2 (two) times a day 180 capsule 2   • venlafaxine (EFFEXOR-XR) 150 mg 24 hr capsule      • venlafaxine (EFFEXOR-XR) 75 mg 24 hr capsule Take 75 mg by mouth daily         Current Allergies     Allergies as of 10/04/2023 - Reviewed 10/04/2023   Allergen Reaction Noted   • Amoxicillin Hives 09/30/2009   • Amoxicillin-pot clavulanate Hives    • Oxycodone-acetaminophen Vomiting    • Sulfa antibiotics Hives    • Erythromycin GI Intolerance 09/30/2009   • Wound dressings Rash             The following portions of the patient's history were reviewed and updated as appropriate: allergies, current medications, past family history, past medical history, past social history, past surgical history and problem list.  Past Medical History:   Diagnosis Date   • Acute bronchitis    • Anxiety    • Arthritis    • Asthma    • Bariatric surgery status    • Continuous positive airway pressure dependence    • COVID-19 vaccine series completed 04/25/2021   • CPAP (continuous positive airway pressure) dependence    • Depression    • Diabetes mellitus    • GERD (gastroesophageal reflux disease)    • Hyperlipidemia    • Hypertension    • Morbid obesity    • Postsurgical malabsorption    • Sleep apnea     USES C PAP     Past Surgical History:   Procedure Laterality Date   • BREAST SURGERY      bilat lumpectomy   •  SECTION      twice   • CHOLECYSTECTOMY     • EGD     • HYSTERECTOMY     • KNEE ARTHROSCOPY Bilateral    • ID LAPS GSTRC RSTRICTIV PX LONGITUDINAL GASTRECTOMY N/A 10/21/2019    Procedure: LAPAROSCOPIC SLEEVE GASTRECTOMY AND INTRAOPERATIVE EGD;  Surgeon: Montse Packer MD;  Location: [facility];  Service: Bariatrics   • WISDOM TOOTH EXTRACTION       Social History     Socioeconomic History   • Marital status: /Civil Union     Spouse name: Not on file   • Number of children: Not on file   • Years of education: Not on file   • Highest education level: Not on file   Occupational History   • Not on file   Tobacco Use   • Smoking status: Never   • Smokeless tobacco: Never   Vaping Use   • Vaping Use: Never used   Substance and Sexual Activity   • Alcohol use: Not Currently     Comment: occasional   • Drug use: No   • Sexual activity: Yes     Partners: Male   Other Topics Concern   • Not on file   Social History Narrative   • Not on file     Social Determinants of Health     Financial Resource Strain: Not on file   Food Insecurity: Not on file   Transportation Needs: Not on file   Physical Activity: Not on file   Stress: Not on file   Social Connections: Not on file   Intimate Partner Violence: Not on file   Housing Stability: Not on file       Objective   /84   Pulse 63   Temp 98.3 °F (36.8 °C) (Temporal)   Resp 18   Ht 5' 4" (1.626 m)   Wt 89.4 kg (197 lb)   SpO2 100%   BMI 33.81 kg/m²      Physical Exam     Physical Exam  Vitals and nursing note reviewed. Constitutional:       General: She is not in acute distress. Appearance: She is well-developed. She is not diaphoretic. HENT:      Head: Normocephalic and atraumatic.       Right Ear: Tympanic membrane and external ear normal.      Left Ear: Tympanic membrane and external ear normal.      Nose: Nose normal.      Mouth/Throat:      Mouth: Mucous membranes are moist.      Pharynx: No oropharyngeal exudate or posterior oropharyngeal erythema. Eyes:      General: No scleral icterus. Right eye: No discharge. Left eye: No discharge. Conjunctiva/sclera: Conjunctivae normal.   Cardiovascular:      Rate and Rhythm: Normal rate and regular rhythm. Heart sounds: Normal heart sounds. No murmur heard. No friction rub. No gallop. Pulmonary:      Effort: Pulmonary effort is normal. No respiratory distress. Breath sounds: Normal breath sounds. No decreased breath sounds, wheezing, rhonchi or rales. Skin:     General: Skin is warm and dry. Coloration: Skin is not pale. Findings: No erythema or rash. Neurological:      Mental Status: She is alert and oriented to person, place, and time. Psychiatric:         Behavior: Behavior normal.         Thought Content:  Thought content normal.         Judgment: Judgment normal.         Fabian Cayr PA-C

## 2023-11-03 DIAGNOSIS — E11.9 DIABETES MELLITUS, NEW ONSET (HCC): Primary | ICD-10-CM

## 2023-11-21 DIAGNOSIS — E11.9 DIABETES MELLITUS, NEW ONSET (HCC): ICD-10-CM

## 2023-11-21 RX ORDER — SEMAGLUTIDE 2.68 MG/ML
INJECTION, SOLUTION SUBCUTANEOUS
Qty: 3 ML | Refills: 1 | Status: SHIPPED | OUTPATIENT
Start: 2023-11-21

## 2023-12-07 ENCOUNTER — TELEPHONE (OUTPATIENT)
Dept: BARIATRICS | Facility: CLINIC | Age: 58
End: 2023-12-07

## 2023-12-07 ENCOUNTER — OFFICE VISIT (OUTPATIENT)
Dept: BARIATRICS | Facility: CLINIC | Age: 58
End: 2023-12-07
Payer: COMMERCIAL

## 2023-12-07 VITALS
DIASTOLIC BLOOD PRESSURE: 73 MMHG | BODY MASS INDEX: 31.24 KG/M2 | HEIGHT: 64 IN | HEART RATE: 71 BPM | RESPIRATION RATE: 16 BRPM | SYSTOLIC BLOOD PRESSURE: 100 MMHG | WEIGHT: 183 LBS

## 2023-12-07 DIAGNOSIS — I10 ESSENTIAL HYPERTENSION: ICD-10-CM

## 2023-12-07 DIAGNOSIS — E66.9 OBESITY, CLASS I, BMI 30-34.9: Primary | ICD-10-CM

## 2023-12-07 DIAGNOSIS — E11.9 DIABETES MELLITUS, NEW ONSET (HCC): ICD-10-CM

## 2023-12-07 PROBLEM — E66.01 OBESITY, CLASS III, BMI 40-49.9 (MORBID OBESITY) (HCC): Status: RESOLVED | Noted: 2018-08-30 | Resolved: 2023-12-07

## 2023-12-07 PROBLEM — E66.812 OBESITY, CLASS II, BMI 35-39.9: Status: RESOLVED | Noted: 2020-01-31 | Resolved: 2023-12-07

## 2023-12-07 PROBLEM — E66.813 OBESITY, CLASS III, BMI 40-49.9 (MORBID OBESITY): Status: RESOLVED | Noted: 2018-08-30 | Resolved: 2023-12-07

## 2023-12-07 PROCEDURE — 99214 OFFICE O/P EST MOD 30 MIN: CPT | Performed by: PHYSICIAN ASSISTANT

## 2023-12-07 RX ORDER — DIPHENOXYLATE HYDROCHLORIDE AND ATROPINE SULFATE 2.5; .025 MG/1; MG/1
1 TABLET ORAL DAILY
COMMUNITY

## 2023-12-07 NOTE — TELEPHONE ENCOUNTER
Called patient to reschedule June appointment as provider out on maternity leave, left message to call back to reschedule appointment with Kirsten.

## 2023-12-07 NOTE — ASSESSMENT & PLAN NOTE
Currently on norvac, losartan and chlorthalidone. To reduce losartan to 50mg. Will be seeing orthopedics next month and will have recheck then.  Following with PCP in next 2 months

## 2023-12-07 NOTE — ASSESSMENT & PLAN NOTE
Lab Results   Component Value Date    HGBA1C 6.0 (H) 09/01/2023   Doing well on ozempic 2mg. A1C has dropped a point.  To continue

## 2023-12-07 NOTE — PROGRESS NOTES
Assessment/Plan:    Obesity, Class I, BMI 30-34.9  - S/P lap sleeve gastrectomy on 10/21/19 by Dr. Jazmyn Dukes.  -Patient is pursuing Conservative Program  -Initial weight loss goal of 5-10% weight loss for improved health  -Screening labs completed and reviewed    Initial:228.2  Last visit: 197  Current: 183  Change:-45.2from initial(-14 lb from last OV)    Goals:  -continue protein and fiber intake  -continue elliptical for exercise  - keep up the great work with water intake. To continue on ozempic 2mg. . Tolerating well with the exception of constipation. Will restart gummy fibers and plans to take magO7. Start dose 234.6 lb on 11/8/22. Patient denies personal and family history of MCT and MEN2 tumors. Patient denies personal history of pancreatitis. - S/P hysterectomy    Diabetes mellitus, new onset ([address])    Lab Results   Component Value Date    HGBA1C 6.0 (H) 09/01/2023   Doing well on ozempic 2mg. A1C has dropped a point. To continue    Essential hypertension  Currently on norvac, losartan and chlorthalidone. To reduce losartan to 50mg. Will be seeing orthopedics next month and will have recheck then. Following with PCP in next 2 months         Follow up in approximately  5 months  with Non-Surgical Physician/Advanced Practitioner. Diagnoses and all orders for this visit:    Obesity, Class I, BMI 30-34.9    Diabetes mellitus, new onset (HCC)  -     semaglutide, 2 mg/dose, (Ozempic, 2 MG/DOSE,) 8 mg/ mL injection pen; Inject 0.75 mL (2 mg total) under the skin every 7 days    Essential hypertension    Other orders  -     glucose blood test strip  -     multivitamin (THERAGRAN) TABS; Take 1 tablet by mouth daily          Subjective:   Chief Complaint   Patient presents with    Follow-up     MWM 4m f/u; Waist- 40.5in        Patient ID: Ermelinda Mandel  is a 66 y.o. female with excess weight/obesity here to pursue weight managment. Patient is pursuing Conservative Program.     HPI  Here for followup.  She is on ozempc 2mg . She denies any side effects. Blood sugar has been controlled and has been under 100 fasting . She does have some constipation with ozempic. Wt Readings from Last 10 Encounters:   12/07/23 83 kg (183 lb)   10/04/23 89.4 kg (197 lb)   08/09/23 89.4 kg (197 lb)   06/16/23 94.6 kg (208 lb 8 oz)   05/04/23 98.1 kg (216 lb 3.2 oz)   03/27/23 99.8 kg (220 lb)   02/22/23 99.8 kg (220 lb)   12/31/22 101 kg (223 lb)   12/24/22 101 kg (223 lb)   12/21/22 103 kg (226 lb 9.6 oz)       Increased appetite/cravings:controlled  Fruit/Vegetable servings:at least 3 servings  Exercise:ellipitical  Hydration:water, [id number], coffee      Colonoscopy-Completed    The following portions of the patient's history were reviewed and updated as appropriate: She  has a past medical history of Acute bronchitis, Anxiety, Arthritis, Asthma, Bariatric surgery status, Continuous positive airway pressure dependence, COVID-19 vaccine series completed (04/25/2021), CPAP (continuous positive airway pressure) dependence, Depression, Diabetes mellitus, GERD (gastroesophageal reflux disease), Hyperlipidemia, Hypertension, Morbid obesity, Postsurgical malabsorption, and Sleep apnea.   She   Patient Active Problem List    Diagnosis Date Noted    Status post laparoscopic sleeve gastrectomy 11/03/2022    Obesity, Class I, BMI 30-34.9 05/05/2020    Encounter for surgical aftercare following surgery of digestive system 05/05/2020    GERD (gastroesophageal reflux disease) 01/31/2020    Bariatric surgery status 01/30/2020    Postsurgical malabsorption 01/30/2020    Type 2 diabetes mellitus with complication ([address]) 34/89/0066    Sleep apnea 09/27/2019    Preprocedural cardiovascular examination 08/12/2019    Knee pain 12/07/2018    Obstructive sleep apnea treated with continuous positive airway pressure (CPAP)     Diabetes mellitus ([address]) 08/31/2018    Essential hypertension 08/31/2018    Depression 08/31/2018    Diabetes mellitus, new onset ([address] [facility])     Dysmetabolic syndrome X     Mixed hyperlipidemia 01/10/2011    Bipolar affective disorder ([address] [facility]) 2010    Extrinsic asthma 2010     She  has a past surgical history that includes Cholecystectomy; Hysterectomy; Knee arthroscopy (Bilateral);  section; EGD; [place] tooth extraction; Breast surgery; and pr laps gstrc rstrictiv px longitudinal gastrectomy (N/A, 10/21/2019). Her family history includes Brain cancer in her mother; Breast cancer in her mother; Diabetes in her father; Heart disease in her father; Hypertension in her father and mother; Melanoma in her father; Prostate cancer in her father; Skin cancer in her father. She  reports that she has never smoked. She has never used smokeless tobacco. She reports that she does not currently use alcohol. She reports that she does not use drugs.   Current Outpatient Medications   Medication Sig Dispense Refill    albuterol (PROVENTIL HFA,VENTOLIN HFA) 90 mcg/act inhaler Inhale 2 puffs as needed      ALPRAZolam (XANAX) 0.25 mg tablet Take 0.25 mg by mouth daily at bedtime as needed      amLODIPine (NORVASC) 10 mg tablet Take 10 mg by mouth daily      chlorthalidone 25 mg tablet       diclofenac sodium (VOLTAREN) 1 % Apply 2 g topically 3 (three) times a day      glucose blood test strip       glucose blood test strip       lamoTRIgine (LaMICtal) 200 MG tablet Take 200 mg by mouth daily       losartan (COZAAR) 100 MG tablet       multivitamin (THERAGRAN) TABS Take 1 tablet by mouth daily      omeprazole (PriLOSEC) 20 mg delayed release capsule Take 1 capsule (20 mg total) by mouth 2 (two) times a day 180 capsule 2    semaglutide, 2 mg/dose, (Ozempic, 2 MG/DOSE,) 8 mg/ mL injection pen Inject 0.75 mL (2 mg total) under the skin every 7 days 3 mL 2    Sodium Hyaluronate 20 MG/2ML SOSY       venlafaxine (EFFEXOR-XR) 150 mg 24 hr capsule       venlafaxine (EFFEXOR-XR) 75 mg 24 hr capsule Take 75 mg by mouth daily       No current facility-administered medications for this visit. Current Outpatient Medications on File Prior to Visit   Medication Sig    albuterol (PROVENTIL HFA,VENTOLIN HFA) 90 mcg/act inhaler Inhale 2 puffs as needed    ALPRAZolam (XANAX) 0.25 mg tablet Take 0.25 mg by mouth daily at bedtime as needed    amLODIPine (NORVASC) 10 mg tablet Take 10 mg by mouth daily    chlorthalidone 25 mg tablet     diclofenac sodium (VOLTAREN) 1 % Apply 2 g topically 3 (three) times a day    glucose blood test strip     glucose blood test strip     lamoTRIgine (LaMICtal) 200 MG tablet Take 200 mg by mouth daily     losartan (COZAAR) 100 MG tablet     multivitamin (THERAGRAN) TABS Take 1 tablet by mouth daily    omeprazole (PriLOSEC) 20 mg delayed release capsule Take 1 capsule (20 mg total) by mouth 2 (two) times a day    Sodium Hyaluronate 20 MG/2ML SOSY     venlafaxine (EFFEXOR-XR) 150 mg 24 hr capsule     venlafaxine (EFFEXOR-XR) 75 mg 24 hr capsule Take 75 mg by mouth daily    [DISCONTINUED] Ozempic, 2 MG/DOSE, 8 MG/3ML injection pen INJECT 0.75 ML (2 MG TOTAL) UNDER THE SKIN EVERY 7 DAYS     No current facility-administered medications on file prior to visit. She is allergic to amoxicillin, amoxicillin-pot clavulanate, oxycodone-acetaminophen, sulfa antibiotics, erythromycin, and wound dressings. .    Review of Systems   Constitutional:  Negative for fever. Respiratory:  Negative for shortness of breath. Cardiovascular:  Negative for chest pain and palpitations. Gastrointestinal:  Positive for constipation. Negative for abdominal pain, diarrhea and vomiting. Genitourinary:  Negative for difficulty urinating. Musculoskeletal:  Positive for arthralgias. Skin:  Negative for rash. Neurological:  Negative for headaches. Psychiatric/Behavioral:  Negative for dysphoric mood. The patient is not nervous/anxious.         Objective:    /73 (BP Location: Left arm, Patient Position: Sitting)   Pulse 71   Resp 16 Ht 5' 4" (1.626 m)   Wt 83 kg (183 lb)   BMI 31.41 kg/m²      Physical Exam  Vitals and nursing note reviewed. Constitutional:       General: She is not in acute distress. Appearance: She is well-developed. She is obese. HENT:      Head: Normocephalic and atraumatic. Eyes:      Conjunctiva/sclera: Conjunctivae normal.   Neck:      Thyroid: No thyromegaly. Pulmonary:      Effort: Pulmonary effort is normal. No respiratory distress. Skin:     Findings: No rash (visible). Neurological:      Mental Status: She is alert and oriented to person, place, and time.    Psychiatric:         Mood and Affect: Mood normal.         Behavior: Behavior normal.

## 2023-12-07 NOTE — ASSESSMENT & PLAN NOTE
- S/P lap sleeve gastrectomy on 10/21/19 by Dr. Faye Tamayo.  -Patient is pursuing Conservative Program  -Initial weight loss goal of 5-10% weight loss for improved health  -Screening labs completed and reviewed    Initial:228.2  Last visit: 197  Current: 183  Change:-45.2from initial(-14 lb from last OV)    Goals:  -continue protein and fiber intake  -continue elliptical for exercise  - keep up the great work with water intake. To continue on ozempic 2mg. . Tolerating well with the exception of constipation. Will restart gummy fibers and plans to take magO7. Start dose 234.6 lb on 11/8/22. Patient denies personal and family history of MCT and MEN2 tumors. Patient denies personal history of pancreatitis.    - S/P hysterectomy

## 2023-12-09 ENCOUNTER — APPOINTMENT (OUTPATIENT)
Dept: LAB | Facility: CLINIC | Age: 58
End: 2023-12-09
Payer: COMMERCIAL

## 2023-12-09 ENCOUNTER — OFFICE VISIT (OUTPATIENT)
Dept: URGENT CARE | Facility: CLINIC | Age: 58
End: 2023-12-09
Payer: COMMERCIAL

## 2023-12-09 VITALS
HEIGHT: 64 IN | HEART RATE: 78 BPM | TEMPERATURE: 97.8 F | BODY MASS INDEX: 31.41 KG/M2 | RESPIRATION RATE: 18 BRPM | SYSTOLIC BLOOD PRESSURE: 124 MMHG | WEIGHT: 184 LBS | DIASTOLIC BLOOD PRESSURE: 58 MMHG | OXYGEN SATURATION: 98 %

## 2023-12-09 DIAGNOSIS — R50.9 INTERMITTENT FEVER OF UNKNOWN ORIGIN: ICD-10-CM

## 2023-12-09 DIAGNOSIS — R53.83 OTHER FATIGUE: Primary | ICD-10-CM

## 2023-12-09 DIAGNOSIS — R53.83 OTHER FATIGUE: ICD-10-CM

## 2023-12-09 PROCEDURE — 99213 OFFICE O/P EST LOW 20 MIN: CPT | Performed by: FAMILY MEDICINE

## 2023-12-09 NOTE — PATIENT INSTRUCTIONS
You are currently being worked up for your complaints by your PCP.   You are to call him on Monday and discuss possible lyme testing since you mentioned you had 2 bug bites   You may discuss with him the injections you had and illness  Follow up as discussed   Go to the ED if symptoms worsen

## 2023-12-09 NOTE — PROGRESS NOTES
[facility]        NAME: Darien Sarmiento is a 66 y.o. female  : 1965    MRN: [id number]  DATE: 2023  TIME: 11:16 AM    Assessment and Plan   Other fatigue [R53.83]  1. Other fatigue        2. Intermittent fever of unknown origin              Patient Instructions       Follow up with PCP in 3-5 days. Proceed to  ER if symptoms worsen. You are currently being worked up for your complaints by your PCP. You are to call him on Monday and discuss possible lyme testing since you mentioned you had 2 bug bites   You may discuss with him the injections you had and illness  Follow up as discussed   Go to the ED if symptoms worsen        Chief Complaint     Chief Complaint   Patient presents with    Cold Like Symptoms     Fever on and off started 6-8 weeks. Was at PCP yesterday who ordered blood work. Also feeling fatigue. She thinks this maybe caused cause by a bug bite. History of Present Illness       This is a 66year old female who states has had intermittent fevers for the last 6-8 weeks as high as 101. She states that she contacted her PCP and yesterday had RSV, covid and flu testing done which was negative. She states that he has ordered a CBC. She states after she got home she remembered she had an insect bite on her right arm and right breast. She states it was red, swollen but no bulls eye rash. She denies seeing a tick or an insect. She states she did not take a picture of it but did look on line and saw something that looked like what she had. She denies current fever. She states she has been very tired and fatigued. She states she was dx with flu 10/4 when seen at urgent care. She states she did get better but can't remember the specific date she became ill again. She can't remember specific date of insect bite. She also admits to getting knee injections and unable to relate dates. She states that her knees have not been red, swollen or painful.    Denies recent cold symptoms. Review of Systems   Review of Systems   Constitutional:  Positive for fatigue and fever. HENT: Negative. Eyes: Negative. Respiratory: Negative. Cardiovascular: Negative. Gastrointestinal: Negative. Endocrine: Negative. Genitourinary: Negative. Musculoskeletal: Negative. Skin: Negative. Allergic/Immunologic: Negative. Neurological: Negative. Hematological: Negative. Psychiatric/Behavioral: Negative.            Current Medications       Current Outpatient Medications:     albuterol (PROVENTIL HFA,VENTOLIN HFA) 90 mcg/act inhaler, Inhale 2 puffs as needed, Disp: , Rfl:     ALPRAZolam (XANAX) 0.25 mg tablet, Take 0.25 mg by mouth daily at bedtime as needed, Disp: , Rfl:     amLODIPine (NORVASC) 10 mg tablet, Take 10 mg by mouth daily, Disp: , Rfl:     diclofenac sodium (VOLTAREN) 1 %, Apply 2 g topically 3 (three) times a day, Disp: , Rfl:     glucose blood test strip, , Disp: , Rfl:     glucose blood test strip, , Disp: , Rfl:     lamoTRIgine (LaMICtal) 200 MG tablet, Take 200 mg by mouth daily , Disp: , Rfl:     losartan (COZAAR) 100 MG tablet, , Disp: , Rfl:     multivitamin (THERAGRAN) TABS, Take 1 tablet by mouth daily, Disp: , Rfl:     omeprazole (PriLOSEC) 20 mg delayed release capsule, Take 1 capsule (20 mg total) by mouth 2 (two) times a day, Disp: 180 capsule, Rfl: 2    semaglutide, 2 mg/dose, (Ozempic, 2 MG/DOSE,) 8 mg/ mL injection pen, Inject 0.75 mL (2 mg total) under the skin every 7 days, Disp: 3 mL, Rfl: 2    Sodium Hyaluronate 20 MG/2ML SOSY, , Disp: , Rfl:     venlafaxine (EFFEXOR-XR) 150 mg 24 hr capsule, , Disp: , Rfl:     venlafaxine (EFFEXOR-XR) 75 mg 24 hr capsule, Take 75 mg by mouth daily, Disp: , Rfl:     chlorthalidone 25 mg tablet, , Disp: , Rfl:     Current Allergies     Allergies as of 12/09/2023 - Reviewed 12/09/2023   Allergen Reaction Noted    Amoxicillin Hives 09/30/2009    Amoxicillin-pot clavulanate Hives Oxycodone-acetaminophen Vomiting     Sulfa antibiotics Hives     Erythromycin GI Intolerance 2009    Wound dressings Rash             The following portions of the patient's history were reviewed and updated as appropriate: allergies, current medications, past family history, past medical history, past social history, past surgical history and problem list.     Past Medical History:   Diagnosis Date    Acute bronchitis     Anxiety     Arthritis     Asthma     Bariatric surgery status     Continuous positive airway pressure dependence     COVID-19 vaccine series completed 2021    CPAP (continuous positive airway pressure) dependence     Depression     Diabetes mellitus     GERD (gastroesophageal reflux disease)     Hyperlipidemia     Hypertension     Morbid obesity     Postsurgical malabsorption     Sleep apnea     USES C PAP       Past Surgical History:   Procedure Laterality Date    BREAST SURGERY      bilat lumpectomy     SECTION      twice    CHOLECYSTECTOMY      EGD      HYSTERECTOMY      KNEE ARTHROSCOPY Bilateral     ND LAPS GSTRC RSTRICTIV PX LONGITUDINAL GASTRECTOMY N/A 10/21/2019    Procedure: LAPAROSCOPIC SLEEVE GASTRECTOMY AND INTRAOPERATIVE EGD;  Surgeon: Ady Larson MD;  Location: [facility] OR;  Service: Bariatrics    WISDOM TOOTH EXTRACTION         Family History   Problem Relation Age of Onset    Breast cancer Mother     Hypertension Mother     Brain cancer Mother     Prostate cancer Father     Melanoma Father     Diabetes Father     Hypertension Father     Heart disease Father     Skin cancer Father     Thyroid disease Neg Hx     Stroke Neg Hx          Medications have been verified. Objective   /58   Pulse 78   Temp 97.8 °F (36.6 °C)   Resp 18   Ht 5' 4" (1.626 m)   Wt 83.5 kg (184 lb)   SpO2 98%   BMI 31.58 kg/m²   No LMP recorded. Patient has had a hysterectomy. Physical Exam     Physical Exam  Vitals and nursing note reviewed.    Constitutional: General: She is not in acute distress. Appearance: Normal appearance. She is normal weight. She is not ill-appearing, toxic-appearing or diaphoretic. HENT:      Head: Normocephalic and atraumatic. Right Ear: Tympanic membrane and ear canal normal.      Left Ear: Tympanic membrane and ear canal normal.      Nose: Nose normal. No congestion or rhinorrhea. Mouth/Throat:      Mouth: Mucous membranes are moist.      Pharynx: Oropharynx is clear. No oropharyngeal exudate or posterior oropharyngeal erythema. Eyes:      Extraocular Movements: Extraocular movements intact. Cardiovascular:      Rate and Rhythm: Normal rate and regular rhythm. Pulses: Normal pulses. Heart sounds: Normal heart sounds. No murmur heard. Pulmonary:      Effort: Pulmonary effort is normal. No respiratory distress. Breath sounds: Normal breath sounds. No stridor. No wheezing, rhonchi or rales. Chest:      Chest wall: No tenderness. Musculoskeletal:         General: Normal range of motion. Cervical back: Normal range of motion and neck supple. Skin:     General: Skin is warm and dry. Capillary Refill: Capillary refill takes less than 2 seconds. Neurological:      General: No focal deficit present. Mental Status: She is alert and oriented to person, place, and time. Psychiatric:         Mood and Affect: Mood normal.         Behavior: Behavior normal.         Thought Content:  Thought content normal.         Judgment: Judgment normal.

## 2023-12-13 ENCOUNTER — HOSPITAL ENCOUNTER (INPATIENT)
Facility: HOSPITAL | Age: 58
LOS: 1 days | Discharge: HOME | End: 2023-12-15
Attending: EMERGENCY MEDICINE | Admitting: INTERNAL MEDICINE
Payer: COMMERCIAL

## 2023-12-13 DIAGNOSIS — R10.9 FLANK PAIN: ICD-10-CM

## 2023-12-13 DIAGNOSIS — E87.6 HYPOKALEMIA: Primary | ICD-10-CM

## 2023-12-13 DIAGNOSIS — R93.429 ABNORMAL CT SCAN, KIDNEY: ICD-10-CM

## 2023-12-13 DIAGNOSIS — R93.41 ABNORMAL RADIOLOGIC FINDINGS ON DIAGNOSTIC IMAGING OF RENAL PELVIS, URETER, OR BLADDER: ICD-10-CM

## 2023-12-13 LAB
BACTERIA UR QL AUTO: ABNORMAL /HPF
BASOPHILS # BLD AUTO: 0.04 THOUSANDS/ÂΜL (ref 0–0.1)
BASOPHILS NFR BLD AUTO: 0 % (ref 0–1)
BILIRUB UR QL STRIP: NEGATIVE
CLARITY UR: CLEAR
COLOR UR: YELLOW
EOSINOPHIL # BLD AUTO: 0.21 THOUSAND/ÂΜL (ref 0–0.61)
EOSINOPHIL NFR BLD AUTO: 2 % (ref 0–6)
ERYTHROCYTE [DISTWIDTH] IN BLOOD BY AUTOMATED COUNT: 12.8 % (ref 11.6–15.1)
GLUCOSE UR STRIP-MCNC: NEGATIVE MG/DL
HCT VFR BLD AUTO: 34.6 % (ref 34.8–46.1)
HGB BLD-MCNC: 11.6 G/DL (ref 11.5–15.4)
HGB UR QL STRIP.AUTO: ABNORMAL
IMM GRANULOCYTES # BLD AUTO: 0.04 THOUSAND/UL (ref 0–0.2)
IMM GRANULOCYTES NFR BLD AUTO: 0 % (ref 0–2)
KETONES UR STRIP-MCNC: NEGATIVE MG/DL
LEUKOCYTE ESTERASE UR QL STRIP: ABNORMAL
LYMPHOCYTES # BLD AUTO: 2.24 THOUSANDS/ÂΜL (ref 0.6–4.47)
LYMPHOCYTES NFR BLD AUTO: 22 % (ref 14–44)
MCH RBC QN AUTO: 27.7 PG (ref 26.8–34.3)
MCHC RBC AUTO-ENTMCNC: 33.5 G/DL (ref 31.4–37.4)
MCV RBC AUTO: 83 FL (ref 82–98)
MONOCYTES # BLD AUTO: 1.15 THOUSAND/ÂΜL (ref 0.17–1.22)
MONOCYTES NFR BLD AUTO: 11 % (ref 4–12)
NEUTROPHILS # BLD AUTO: 6.64 THOUSANDS/ÂΜL (ref 1.85–7.62)
NEUTS SEG NFR BLD AUTO: 65 % (ref 43–75)
NITRITE UR QL STRIP: NEGATIVE
NON-SQ EPI CELLS URNS QL MICRO: ABNORMAL /HPF
NRBC BLD AUTO-RTO: 0 /100 WBCS
PH UR STRIP.AUTO: 6 [PH] (ref 4.5–8)
PLATELET # BLD AUTO: 401 THOUSANDS/UL (ref 149–390)
PMV BLD AUTO: 8.3 FL (ref 8.9–12.7)
PROT UR STRIP-MCNC: NEGATIVE MG/DL
RBC # BLD AUTO: 4.19 MILLION/UL (ref 3.81–5.12)
RBC #/AREA URNS AUTO: ABNORMAL /HPF
SP GR UR STRIP.AUTO: 1.01 (ref 1–1.03)
UROBILINOGEN UR QL STRIP.AUTO: 0.2 E.U./DL
WBC # BLD AUTO: 10.32 THOUSAND/UL (ref 4.31–10.16)
WBC #/AREA URNS AUTO: ABNORMAL /HPF

## 2023-12-13 PROCEDURE — 36415 COLL VENOUS BLD VENIPUNCTURE: CPT

## 2023-12-13 PROCEDURE — 87040 BLOOD CULTURE FOR BACTERIA: CPT

## 2023-12-13 PROCEDURE — 85025 COMPLETE CBC W/AUTO DIFF WBC: CPT

## 2023-12-13 PROCEDURE — 99284 EMERGENCY DEPT VISIT MOD MDM: CPT

## 2023-12-13 PROCEDURE — 96361 HYDRATE IV INFUSION ADD-ON: CPT

## 2023-12-13 PROCEDURE — 81001 URINALYSIS AUTO W/SCOPE: CPT

## 2023-12-13 PROCEDURE — 99285 EMERGENCY DEPT VISIT HI MDM: CPT | Performed by: EMERGENCY MEDICINE

## 2023-12-13 PROCEDURE — 87086 URINE CULTURE/COLONY COUNT: CPT | Performed by: NURSE PRACTITIONER

## 2023-12-13 PROCEDURE — 83690 ASSAY OF LIPASE: CPT

## 2023-12-13 PROCEDURE — 80053 COMPREHEN METABOLIC PANEL: CPT

## 2023-12-13 PROCEDURE — 83605 ASSAY OF LACTIC ACID: CPT

## 2023-12-13 PROCEDURE — 84145 PROCALCITONIN (PCT): CPT | Performed by: INTERNAL MEDICINE

## 2023-12-13 RX ORDER — ACETAMINOPHEN 325 MG/1
975 TABLET ORAL ONCE
Status: COMPLETED | OUTPATIENT
Start: 2023-12-13 | End: 2023-12-13

## 2023-12-13 RX ADMIN — SODIUM CHLORIDE 1000 ML: 0.9 INJECTION, SOLUTION INTRAVENOUS at 23:28

## 2023-12-13 RX ADMIN — IOHEXOL 50 ML: 240 INJECTION, SOLUTION INTRATHECAL; INTRAVASCULAR; INTRAVENOUS; ORAL at 23:35

## 2023-12-13 RX ADMIN — ACETAMINOPHEN 975 MG: 325 TABLET, FILM COATED ORAL at 23:03

## 2023-12-14 ENCOUNTER — APPOINTMENT (EMERGENCY)
Dept: RADIOLOGY | Facility: HOSPITAL | Age: 58
End: 2023-12-14
Payer: COMMERCIAL

## 2023-12-14 PROBLEM — R93.41 ABNORMAL RADIOLOGIC FINDINGS ON DIAGNOSTIC IMAGING OF RENAL PELVIS, URETER, OR BLADDER: Status: ACTIVE | Noted: 2023-12-14

## 2023-12-14 PROBLEM — E87.6 HYPOKALEMIA: Status: ACTIVE | Noted: 2023-12-14

## 2023-12-14 LAB
ALBUMIN SERPL BCP-MCNC: 3.1 G/DL (ref 3.5–5)
ALBUMIN SERPL BCP-MCNC: 3.4 G/DL (ref 3.5–5)
ALP SERPL-CCNC: 105 U/L (ref 34–104)
ALP SERPL-CCNC: 90 U/L (ref 34–104)
ALT SERPL W P-5'-P-CCNC: 22 U/L (ref 7–52)
ALT SERPL W P-5'-P-CCNC: 31 U/L (ref 7–52)
ANION GAP SERPL CALCULATED.3IONS-SCNC: 11 MMOL/L
ANION GAP SERPL CALCULATED.3IONS-SCNC: 9 MMOL/L
AST SERPL W P-5'-P-CCNC: 17 U/L (ref 13–39)
AST SERPL W P-5'-P-CCNC: 43 U/L (ref 13–39)
BASOPHILS # BLD AUTO: 0.04 THOUSANDS/ÂΜL (ref 0–0.1)
BASOPHILS NFR BLD AUTO: 1 % (ref 0–1)
BILIRUB SERPL-MCNC: 0.36 MG/DL (ref 0.2–1)
BILIRUB SERPL-MCNC: 0.42 MG/DL (ref 0.2–1)
BUN SERPL-MCNC: 12 MG/DL (ref 5–25)
BUN SERPL-MCNC: 8 MG/DL (ref 5–25)
CALCIUM ALBUM COR SERPL-MCNC: 9.3 MG/DL (ref 8.3–10.1)
CALCIUM ALBUM COR SERPL-MCNC: 9.4 MG/DL (ref 8.3–10.1)
CALCIUM SERPL-MCNC: 8.6 MG/DL (ref 8.4–10.2)
CALCIUM SERPL-MCNC: 8.9 MG/DL (ref 8.4–10.2)
CHLORIDE SERPL-SCNC: 103 MMOL/L (ref 96–108)
CHLORIDE SERPL-SCNC: 97 MMOL/L (ref 96–108)
CO2 SERPL-SCNC: 28 MMOL/L (ref 21–32)
CO2 SERPL-SCNC: 33 MMOL/L (ref 21–32)
CREAT SERPL-MCNC: 0.76 MG/DL (ref 0.6–1.3)
CREAT SERPL-MCNC: 0.86 MG/DL (ref 0.6–1.3)
EOSINOPHIL # BLD AUTO: 0.18 THOUSAND/ÂΜL (ref 0–0.61)
EOSINOPHIL NFR BLD AUTO: 2 % (ref 0–6)
ERYTHROCYTE [DISTWIDTH] IN BLOOD BY AUTOMATED COUNT: 12.8 % (ref 11.6–15.1)
EST. AVERAGE GLUCOSE BLD GHB EST-MCNC: 131 MG/DL
GFR SERPL CREATININE-BSD FRML MDRD: 75 ML/MIN/1.73SQ M
GFR SERPL CREATININE-BSD FRML MDRD: 87 ML/MIN/1.73SQ M
GLUCOSE SERPL-MCNC: 107 MG/DL (ref 65–140)
GLUCOSE SERPL-MCNC: 111 MG/DL (ref 65–140)
GLUCOSE SERPL-MCNC: 116 MG/DL (ref 65–140)
GLUCOSE SERPL-MCNC: 120 MG/DL (ref 65–140)
GLUCOSE SERPL-MCNC: 184 MG/DL (ref 65–140)
GLUCOSE SERPL-MCNC: 94 MG/DL (ref 65–140)
HBA1C MFR BLD: 6.2 %
HCT VFR BLD AUTO: 38.9 % (ref 34.8–46.1)
HGB BLD-MCNC: 13 G/DL (ref 11.5–15.4)
IMM GRANULOCYTES # BLD AUTO: 0.02 THOUSAND/UL (ref 0–0.2)
IMM GRANULOCYTES NFR BLD AUTO: 0 % (ref 0–2)
LACTATE SERPL-SCNC: 1.4 MMOL/L (ref 0.5–2)
LIPASE SERPL-CCNC: 25 U/L (ref 11–82)
LYMPHOCYTES # BLD AUTO: 2.48 THOUSANDS/ÂΜL (ref 0.6–4.47)
LYMPHOCYTES NFR BLD AUTO: 28 % (ref 14–44)
MAGNESIUM SERPL-MCNC: 2 MG/DL (ref 1.9–2.7)
MCH RBC QN AUTO: 28 PG (ref 26.8–34.3)
MCHC RBC AUTO-ENTMCNC: 33.4 G/DL (ref 31.4–37.4)
MCV RBC AUTO: 84 FL (ref 82–98)
MONOCYTES # BLD AUTO: 0.99 THOUSAND/ÂΜL (ref 0.17–1.22)
MONOCYTES NFR BLD AUTO: 11 % (ref 4–12)
NEUTROPHILS # BLD AUTO: 5.15 THOUSANDS/ÂΜL (ref 1.85–7.62)
NEUTS SEG NFR BLD AUTO: 58 % (ref 43–75)
NRBC BLD AUTO-RTO: 0 /100 WBCS
PHOSPHATE SERPL-MCNC: 3.5 MG/DL (ref 2.7–4.5)
PLATELET # BLD AUTO: 408 THOUSANDS/UL (ref 149–390)
PMV BLD AUTO: 8.3 FL (ref 8.9–12.7)
POTASSIUM SERPL-SCNC: 2.6 MMOL/L (ref 3.5–5.3)
POTASSIUM SERPL-SCNC: 3 MMOL/L (ref 3.5–5.3)
PROCALCITONIN SERPL-MCNC: 0.2 NG/ML
PROT SERPL-MCNC: 6.2 G/DL (ref 6.4–8.4)
PROT SERPL-MCNC: 6.8 G/DL (ref 6.4–8.4)
RBC # BLD AUTO: 4.65 MILLION/UL (ref 3.81–5.12)
SODIUM SERPL-SCNC: 139 MMOL/L (ref 135–147)
SODIUM SERPL-SCNC: 142 MMOL/L (ref 135–147)
WBC # BLD AUTO: 8.86 THOUSAND/UL (ref 4.31–10.16)

## 2023-12-14 PROCEDURE — G1004 CDSM NDSC: HCPCS

## 2023-12-14 PROCEDURE — 99222 1ST HOSP IP/OBS MODERATE 55: CPT | Performed by: STUDENT IN AN ORGANIZED HEALTH CARE EDUCATION/TRAINING PROGRAM

## 2023-12-14 PROCEDURE — 82948 REAGENT STRIP/BLOOD GLUCOSE: CPT

## 2023-12-14 PROCEDURE — 96375 TX/PRO/DX INJ NEW DRUG ADDON: CPT

## 2023-12-14 PROCEDURE — 83735 ASSAY OF MAGNESIUM: CPT | Performed by: INTERNAL MEDICINE

## 2023-12-14 PROCEDURE — 74177 CT ABD & PELVIS W/CONTRAST: CPT

## 2023-12-14 PROCEDURE — 84100 ASSAY OF PHOSPHORUS: CPT | Performed by: INTERNAL MEDICINE

## 2023-12-14 PROCEDURE — 80053 COMPREHEN METABOLIC PANEL: CPT | Performed by: INTERNAL MEDICINE

## 2023-12-14 PROCEDURE — 99223 1ST HOSP IP/OBS HIGH 75: CPT | Performed by: INTERNAL MEDICINE

## 2023-12-14 PROCEDURE — 85025 COMPLETE CBC W/AUTO DIFF WBC: CPT | Performed by: INTERNAL MEDICINE

## 2023-12-14 PROCEDURE — 83036 HEMOGLOBIN GLYCOSYLATED A1C: CPT | Performed by: INTERNAL MEDICINE

## 2023-12-14 PROCEDURE — 96365 THER/PROPH/DIAG IV INF INIT: CPT

## 2023-12-14 RX ORDER — LAMOTRIGINE 100 MG/1
200 TABLET ORAL DAILY
Status: DISCONTINUED | OUTPATIENT
Start: 2023-12-14 | End: 2023-12-15 | Stop reason: HOSPADM

## 2023-12-14 RX ORDER — ONDANSETRON 2 MG/ML
4 INJECTION INTRAMUSCULAR; INTRAVENOUS EVERY 6 HOURS PRN
Status: DISCONTINUED | OUTPATIENT
Start: 2023-12-14 | End: 2023-12-15 | Stop reason: HOSPADM

## 2023-12-14 RX ORDER — ALBUTEROL SULFATE 90 UG/1
2 AEROSOL, METERED RESPIRATORY (INHALATION) EVERY 4 HOURS PRN
Status: DISCONTINUED | OUTPATIENT
Start: 2023-12-14 | End: 2023-12-15 | Stop reason: HOSPADM

## 2023-12-14 RX ORDER — ENOXAPARIN SODIUM 100 MG/ML
40 INJECTION SUBCUTANEOUS DAILY
Status: DISCONTINUED | OUTPATIENT
Start: 2023-12-14 | End: 2023-12-15 | Stop reason: HOSPADM

## 2023-12-14 RX ORDER — DOCUSATE SODIUM 100 MG/1
100 CAPSULE, LIQUID FILLED ORAL 2 TIMES DAILY PRN
Status: DISCONTINUED | OUTPATIENT
Start: 2023-12-14 | End: 2023-12-15 | Stop reason: HOSPADM

## 2023-12-14 RX ORDER — MORPHINE SULFATE 4 MG/ML
4 INJECTION, SOLUTION INTRAMUSCULAR; INTRAVENOUS ONCE
Status: COMPLETED | OUTPATIENT
Start: 2023-12-14 | End: 2023-12-14

## 2023-12-14 RX ORDER — SODIUM CHLORIDE, SODIUM GLUCONATE, SODIUM ACETATE, POTASSIUM CHLORIDE, MAGNESIUM CHLORIDE, SODIUM PHOSPHATE, DIBASIC, AND POTASSIUM PHOSPHATE .53; .5; .37; .037; .03; .012; .00082 G/100ML; G/100ML; G/100ML; G/100ML; G/100ML; G/100ML; G/100ML
125 INJECTION, SOLUTION INTRAVENOUS CONTINUOUS
Status: DISCONTINUED | OUTPATIENT
Start: 2023-12-14 | End: 2023-12-15

## 2023-12-14 RX ORDER — LOSARTAN POTASSIUM 50 MG/1
100 TABLET ORAL DAILY
Status: DISCONTINUED | OUTPATIENT
Start: 2023-12-14 | End: 2023-12-15 | Stop reason: HOSPADM

## 2023-12-14 RX ORDER — ALPRAZOLAM 0.25 MG/1
0.25 TABLET ORAL
Status: DISCONTINUED | OUTPATIENT
Start: 2023-12-14 | End: 2023-12-15 | Stop reason: HOSPADM

## 2023-12-14 RX ORDER — MAGNESIUM HYDROXIDE/ALUMINUM HYDROXICE/SIMETHICONE 120; 1200; 1200 MG/30ML; MG/30ML; MG/30ML
30 SUSPENSION ORAL EVERY 6 HOURS PRN
Status: DISCONTINUED | OUTPATIENT
Start: 2023-12-14 | End: 2023-12-15 | Stop reason: HOSPADM

## 2023-12-14 RX ORDER — PANTOPRAZOLE SODIUM 20 MG/1
20 TABLET, DELAYED RELEASE ORAL
Status: DISCONTINUED | OUTPATIENT
Start: 2023-12-14 | End: 2023-12-15 | Stop reason: HOSPADM

## 2023-12-14 RX ORDER — POTASSIUM CHLORIDE 14.9 MG/ML
20 INJECTION INTRAVENOUS ONCE
Status: COMPLETED | OUTPATIENT
Start: 2023-12-14 | End: 2023-12-14

## 2023-12-14 RX ORDER — POTASSIUM CHLORIDE 20 MEQ/1
40 TABLET, EXTENDED RELEASE ORAL ONCE
Status: COMPLETED | OUTPATIENT
Start: 2023-12-14 | End: 2023-12-14

## 2023-12-14 RX ORDER — INSULIN LISPRO 100 [IU]/ML
1-6 INJECTION, SOLUTION INTRAVENOUS; SUBCUTANEOUS
Status: DISCONTINUED | OUTPATIENT
Start: 2023-12-14 | End: 2023-12-15 | Stop reason: HOSPADM

## 2023-12-14 RX ORDER — AMLODIPINE BESYLATE 10 MG/1
10 TABLET ORAL DAILY
Status: DISCONTINUED | OUTPATIENT
Start: 2023-12-14 | End: 2023-12-15 | Stop reason: HOSPADM

## 2023-12-14 RX ORDER — INSULIN LISPRO 100 [IU]/ML
1-5 INJECTION, SOLUTION INTRAVENOUS; SUBCUTANEOUS
Status: DISCONTINUED | OUTPATIENT
Start: 2023-12-14 | End: 2023-12-15 | Stop reason: HOSPADM

## 2023-12-14 RX ORDER — ACETAMINOPHEN 325 MG/1
650 TABLET ORAL EVERY 6 HOURS PRN
Status: DISCONTINUED | OUTPATIENT
Start: 2023-12-14 | End: 2023-12-15 | Stop reason: HOSPADM

## 2023-12-14 RX ADMIN — POTASSIUM CHLORIDE 40 MEQ: 1500 TABLET, EXTENDED RELEASE ORAL at 00:40

## 2023-12-14 RX ADMIN — LOSARTAN POTASSIUM 100 MG: 50 TABLET, FILM COATED ORAL at 08:56

## 2023-12-14 RX ADMIN — MORPHINE SULFATE 4 MG: 4 INJECTION, SOLUTION INTRAMUSCULAR; INTRAVENOUS at 02:45

## 2023-12-14 RX ADMIN — MORPHINE SULFATE 2 MG: 2 INJECTION, SOLUTION INTRAMUSCULAR; INTRAVENOUS at 08:02

## 2023-12-14 RX ADMIN — Medication 2 G: at 18:23

## 2023-12-14 RX ADMIN — POTASSIUM CHLORIDE 20 MEQ: 14.9 INJECTION, SOLUTION INTRAVENOUS at 01:34

## 2023-12-14 RX ADMIN — SODIUM CHLORIDE, SODIUM GLUCONATE, SODIUM ACETATE, POTASSIUM CHLORIDE, MAGNESIUM CHLORIDE, SODIUM PHOSPHATE, DIBASIC, AND POTASSIUM PHOSPHATE 125 ML/HR: .53; .5; .37; .037; .03; .012; .00082 INJECTION, SOLUTION INTRAVENOUS at 04:15

## 2023-12-14 RX ADMIN — PANTOPRAZOLE SODIUM 20 MG: 20 TABLET, DELAYED RELEASE ORAL at 05:05

## 2023-12-14 RX ADMIN — B-COMPLEX W/ C & FOLIC ACID TAB 1 TABLET: TAB at 08:56

## 2023-12-14 RX ADMIN — ONDANSETRON 4 MG: 2 INJECTION INTRAMUSCULAR; INTRAVENOUS at 08:08

## 2023-12-14 RX ADMIN — AMLODIPINE BESYLATE 10 MG: 10 TABLET ORAL at 08:56

## 2023-12-14 RX ADMIN — SODIUM CHLORIDE, SODIUM GLUCONATE, SODIUM ACETATE, POTASSIUM CHLORIDE, MAGNESIUM CHLORIDE, SODIUM PHOSPHATE, DIBASIC, AND POTASSIUM PHOSPHATE 125 ML/HR: .53; .5; .37; .037; .03; .012; .00082 INJECTION, SOLUTION INTRAVENOUS at 20:39

## 2023-12-14 RX ADMIN — MORPHINE SULFATE 2 MG: 2 INJECTION, SOLUTION INTRAMUSCULAR; INTRAVENOUS at 21:03

## 2023-12-14 RX ADMIN — LAMOTRIGINE 200 MG: 100 TABLET ORAL at 08:56

## 2023-12-14 RX ADMIN — SODIUM CHLORIDE, SODIUM GLUCONATE, SODIUM ACETATE, POTASSIUM CHLORIDE, MAGNESIUM CHLORIDE, SODIUM PHOSPHATE, DIBASIC, AND POTASSIUM PHOSPHATE 125 ML/HR: .53; .5; .37; .037; .03; .012; .00082 INJECTION, SOLUTION INTRAVENOUS at 12:15

## 2023-12-14 RX ADMIN — INSULIN LISPRO 1 UNITS: 100 INJECTION, SOLUTION INTRAVENOUS; SUBCUTANEOUS at 18:24

## 2023-12-14 RX ADMIN — IOHEXOL 100 ML: 350 INJECTION, SOLUTION INTRAVENOUS at 00:59

## 2023-12-14 RX ADMIN — VENLAFAXINE HYDROCHLORIDE 225 MG: 150 CAPSULE, EXTENDED RELEASE ORAL at 08:58

## 2023-12-14 RX ADMIN — ONDANSETRON 4 MG: 2 INJECTION INTRAMUSCULAR; INTRAVENOUS at 21:03

## 2023-12-14 RX ADMIN — Medication 2 G: at 08:58

## 2023-12-14 NOTE — H&P
[address] [facility]  H&P  Name: Liam Dunn 66 y.o. female I MRN: [id number]  Unit/Bed#: ED 32 I Date of Admission: 12/13/2023   Date of Service: 12/14/2023 I Hospital Day: 0      Assessment/Plan   * Abnormal radiologic findings on diagnostic imaging of renal pelvis, ureter, or bladder  Assessment & Plan  Patient reports 6 to 8 weeks of intermittent fevers as high as 101°F lasting for approximately the whole day to about 3 to 5 days. Denies any dysuria or hesitancy or any previous urologic abnormality. No episode of fevers for more than 48 hours. However claims to have new onset bilateral flank pain. Currently not tender when palpated. CT scan of the abdomen showing heterogenous appearance of bilateral kidneys to correlate with possible pyelonephritis. Of note patient does not have fever and urinalysis is unremarkable. BUN/creatinine are both within normal and same as previous. Questionable urologic abnormality such as urinary reflux causing renal findings in CT of the abdomen and pelvis. Urology consult for opinion. At this point, will not start antibiotics. Patient is afebrile and white count normal.  Will get procalcitonin. In the event patient has fever of greater than 100.3 °F; repeat blood cultures and consider starting patient on antibiotics (Rocephin?). Prior to admission, blood cultures have been ordered by ER x 2. Hypokalemia  Assessment & Plan  Patient found to be hypokalemic most likely secondary to diarrhea for about a day yesterday. (Already resolved)  Also has poor p.o. intake (only had a cracker this evening). Repleted with oral and IV potassium. Will recheck metabolic profile. Essential hypertension  Assessment & Plan  Continue Norvasc 10 mg daily;  Continue Cozaar 100 mg daily.     Type 2 diabetes mellitus without complication, without long-term current use of insulin [facility])  Assessment & Plan  Lab Results   Component Value Date    HGBA1C 6.0 (H) 09/01/2023       Patient states that she used to be a diabetic but currently takes Ozempic more for weight loss. Will continue diabetic diet. Will place patient on insulin sliding scale and Accu-Cheks per protocol. Blood Sugar Average: Last 72 hrs:        Bipolar affective disorder [facility])  Assessment & Plan  Continue Xanax as needed. Also on Effexor 225 mg daily. Obstructive sleep apnea treated with continuous positive airway pressure (CPAP)  Assessment & Plan  Continue CPAP at night. Status post laparoscopic sleeve gastrectomy  Assessment & Plan  Status post laparoscopic sleeve gastrectomy and follows with bariatric surgery. GERD (gastroesophageal reflux disease)  Assessment & Plan  On pantoprazole. Maalox as needed. VTE Prophylaxis: Enoxaparin (Lovenox)  / sequential compression device   Code Status: Level 1 - Full Code as discussed with patient  POLST: There is no POLST form on file for this patient (pre-hospital)    Anticipated Length of Stay:  Patient will be admitted on an Inpatient basis with an anticipated length of stay of greater than 2 midnights. Justification for Hospital Stay: Please see detailed plans noted above. Chief Complaint:     Bilateral flank pain and intermittent fever  History of Present Illness:  Agus Phillips is a 66 y.o. female who has past medical history significant for diabetes mellitus and obesity. Patient was formerly a diabetic however currently is much more controlled but still takes Ozempic for weight loss issues. She also has a history of sleeve gastrectomy and being followed by bariatric surgery. Patient has depression, bipolar disorder, essential hypertension, GERD and extrinsic asthma and hyperlipidemia. Patient comes in because of bilateral flank pain. Patient states that approximately 6 to 8 weeks ago she noticed that she was having fever after being given the flu vaccine.   Patient states that few days later she also got the flu despite the vaccine. Since then, she has been having fever approximately 101 °F lasting for about 1 to as long as 5 days and it would resolve on its own. She has intermittent chills. Sometimes she will wake up to a sweat. Patient denies any dysuria or hesitancy. Denies any hypogastric pain. She brought this to the attention of her primary care physician who made several tests including COVID, TSH which were within normal limits. Approximately 48 hours ago, she had a bout of nausea and watery diarrhea however that currently resolved. She has not eaten very well for the past 24 hours and tolerated crackers just this morning. Approximately 24 hours ago, the patient has been having bilateral flank pain but no fever. A CT of the abdomen and pelvis showed heterogenous consistency of the renal parenchyma. Adrenals are unremarkable. Blood cultures have been taken. However patient laboratories does not point to an active infection and urine seems to be clean. The patient is placed in hospital due to the abnormalities found in CT scan. At this point, the patient may benefit from a urologic opinion regarding these findings. If patient develops fever, patient may benefit from another set of blood cultures and urinalysis/culture and to start antibiotics and possibly infectious disease consult. Currently, patient is relatively comfortable on bed. Review of Systems:    Constitutional: Intermittent fever with chills; intermittent morning sweats  Eyes:  Denies change in visual acuity   HENT:  Denies nasal congestion or sore throat   Respiratory:  Denies cough or shortness of breath   Cardiovascular:  Denies chest pain or edema   GI:  Denies abdominal pain, sometimes with nausea, diarrhea for the past 48 hours resolving.   :  Denies dysuria   Musculoskeletal:  Denies back pain or joint pain   Integument:  Denies rash   Neurologic:  Denies headache, focal weakness or sensory changes   Endocrine:  Denies polyuria or polydipsia   Psychiatric:  Denies depression or anxiety     Past Medical and Surgical History:   Past Medical History:   Diagnosis Date    Acute bronchitis     Anxiety     Arthritis     Asthma     Bariatric surgery status     Continuous positive airway pressure dependence     COVID-19 vaccine series completed 2021    CPAP (continuous positive airway pressure) dependence     Depression     Diabetes mellitus     GERD (gastroesophageal reflux disease)     Hyperlipidemia     Hypertension     Morbid obesity     Postsurgical malabsorption     Sleep apnea     USES C PAP     Past Surgical History:   Procedure Laterality Date    BREAST SURGERY      bilat lumpectomy     SECTION      twice    CHOLECYSTECTOMY      EGD      HYSTERECTOMY      KNEE ARTHROSCOPY Bilateral     MA LAPS GSTRC RSTRICTIV PX LONGITUDINAL GASTRECTOMY N/A 10/21/2019    Procedure: LAPAROSCOPIC SLEEVE GASTRECTOMY AND INTRAOPERATIVE EGD;  Surgeon: Ludwin Johnson MD;  Location: [facility];  Service: Bariatrics    WISDOM TOOTH EXTRACTION         Meds/Allergies:  (Not in a hospital admission)      Allergies:    Allergies   Allergen Reactions    Amoxicillin Hives    Amoxicillin-Pot Clavulanate Hives    Oxycodone-Acetaminophen Vomiting    Sulfa Antibiotics Hives    Erythromycin GI Intolerance    Wound Dressings Rash     Plastic adhesive tape- okay with paper tape     History:  Marital Status: /Civil Union   Occupation:  for disability benefits  Patient Pre-hospital Living Situation: Lives at home  Patient Pre-hospital Level of Mobility: Ambulatory  Patient Pre-hospital Diet Restrictions: Regular diet  Substance Use History:   Social History     Substance and Sexual Activity   Alcohol Use Not Currently    Comment: occasional     Social History     Tobacco Use   Smoking Status Never   Smokeless Tobacco Never     Social History     Substance and Sexual Activity   Drug Use No       Family History:  Family History   Problem Relation Age of Onset    Breast cancer Mother     Hypertension Mother     Brain cancer Mother     Prostate cancer Father     Melanoma Father     Diabetes Father     Hypertension Father     Heart disease Father     Skin cancer Father     Thyroid disease Neg Hx     Stroke Neg Hx        Physical Exam:     Vitals:   Blood Pressure: 126/69 (12/14/23 0130)  Pulse: 62 (12/14/23 0130)  Temperature: 98.6 °F (37 °C) (12/13/23 2214)  Temp Source: Oral (12/13/23 2214)  Respirations: 18 (12/14/23 0130)  SpO2: 99 % (12/14/23 0130)    Constitutional:  Well developed, well nourished, no acute distress, non-toxic appearance overweight habitus  Eyes:  PERRL, conjunctiva normal   HENT:  Atraumatic, external ears normal, nose normal, oropharynx moist, no pharyngeal exudates. Neck- normal range of motion, no tenderness, supple   Respiratory:  No respiratory distress, normal breath sounds, no rales, no wheezing   Cardiovascular:  Normal rate, normal rhythm, no murmurs, no gallops, no rubs   GI:  Soft, nondistended, normal bowel sounds, nontender, no organomegaly, no mass, no rebound, no guarding   :  No costovertebral angle tenderness at the moment  Musculoskeletal:  No edema, no tenderness, no deformities. Back- no tenderness  Integument:  Well hydrated, no rash   Lymphatic:  No lymphadenopathy noted   Neurologic:  Alert &awake, communicative, CN 2-12 normal, normal motor function, normal sensory function, no focal deficits noted   Psychiatric:  Speech and behavior appropriate       Lab Results: I have personally reviewed pertinent reports.       Results from last 7 days   Lab Units 12/13/23  2327   WBC Thousand/uL 10.32*   HEMOGLOBIN g/dL 11.6   HEMATOCRIT % 34.6*   PLATELETS Thousands/uL 401*   NEUTROS PCT % 65   LYMPHS PCT % 22   MONOS PCT % 11   EOS PCT % 2     Results from last 7 days   Lab Units 12/13/23  2327   POTASSIUM mmol/L 2.6*   CHLORIDE mmol/L 97   CO2 mmol/L 33*   BUN mg/dL 12   CREATININE mg/dL 0.86   CALCIUM mg/dL 8.9   ALK PHOS U/L 90   ALT U/L 22   AST U/L 17           Imaging: I have personally reviewed pertinent reports. CT abdomen pelvis with contrast    Result Date: 12/14/2023  Narrative: CT ABDOMEN AND PELVIS WITH IV CONTRAST INDICATION:   Abdominal pain, acute, nonlocalized abdominal discomfort, nausea, vomiting, intermittent fevers, h/o sleeve gastrectomy. COMPARISON:  None. TECHNIQUE:  CT examination of the abdomen and pelvis was performed. Multiplanar 2D reformatted images were created from the source data. This examination, like all CT scans performed in the [facility], was performed utilizing techniques to minimize radiation dose exposure, including the use of iterative reconstruction and automated exposure control. Radiation dose length product (DLP) for this visit:  536.99 mGy-cm IV Contrast:  50 mL of iohexol (OMNIPAQUE) 100 mL of iohexol (OMNIPAQUE) Enteric Contrast:  Enteric contrast was not administered. FINDINGS: ABDOMEN LOWER CHEST:  No clinically significant abnormality identified in the visualized lower chest. LIVER/BILIARY TREE:  Unremarkable. GALLBLADDER:  Gallbladder is surgically absent. SPLEEN:  Unremarkable. PANCREAS:  Unremarkable. ADRENAL GLANDS:  Unremarkable. KIDNEYS/URETERS: Kidneys are normal in size and position. Too small to characterize hypodensity in the midpole of the right kidney. Subtle heterogeneous appearance of the kidneys are seen of uncertain etiology. Findings may be secondary to perfusion abnormality, timing of contrast enhancement, or infiltrative infection/pyelonephritis. Recommend clinical correlation with urinalysis for pyelonephritis. No suspicious focal renal mass, nephrolithiasis, hydronephrosis, or perinephric fluid collections bilaterally. STOMACH AND BOWEL: Small hiatal hernia. Surgical sutures along the gastroesophageal junction region and proximal greater curvature of the stomach likely from prior sleeve gastrectomy.  Stomach is slightly distended with oral contrast. No intraluminal mass  or irregular wall thickening. Small and large bowel loops are normal in course and caliber without obstruction or inflammation. Terminal ileum and appendix are grossly unremarkable. APPENDIX:  No findings to suggest appendicitis. ABDOMINOPELVIC CAVITY:  No ascites. No pneumoperitoneum. No lymphadenopathy. VESSELS:  Unremarkable for patient's age. PELVIS REPRODUCTIVE ORGANS:  Surgical changes of prior hysterectomy. URINARY BLADDER:  Unremarkable. ABDOMINAL WALL/INGUINAL REGIONS:  Unremarkable. OSSEOUS STRUCTURES:  No acute fracture or destructive osseous lesion. Impression: 1. Heterogeneous appearance of the bilateral kidneys noted of uncertain etiology as discussed above. Recommend correlation with urinalysis for diffuse pyelonephritis. No perinephric fluid collection, nephrolithiasis or hydronephrosis bilaterally. 2.  Small hiatal hernia and postsurgical changes from prior sleeve gastrectomy. No evidence for bowel obstruction, inflammation, appendicitis, free air, or free fluid. Postsurgical changes from prior cholecystectomy and hysterectomy. Workstation performed: [id number]         ** Please Note: Dragon 360 Dictation voice to text software was used in the creation of this document.  **

## 2023-12-14 NOTE — ASSESSMENT & PLAN NOTE
Lab Results   Component Value Date    HGBA1C 6.0 (H) 09/01/2023       Patient states that she used to be a diabetic but currently takes Ozempic more for weight loss. Will continue diabetic diet. Will place patient on insulin sliding scale and Accu-Cheks per protocol.       Blood Sugar Average: Last 72 hrs:

## 2023-12-14 NOTE — QUICK NOTE
Post admit follow-up, patient evaluated independently of admitting provider. Patient states she feels well today, flank pain is mild. Denies feelings of fever overnight. Awaiting urology recommendations regarding imaging and will add a.m. cortisol as well as some autoimmune related markers to morning labs.   If patient is deemed appropriate from urology standpoint and afebrile for 24 hours with plan for discharge tomorrow

## 2023-12-14 NOTE — ASSESSMENT & PLAN NOTE
CT: Subtle heterogenous appearance of the kidneys of uncertain etiology (possible perfusion abnormality, timing of contrast enhancement or infiltrative infection/pyelonephritis)  Urine with pyuria, no bacteria  Check urine culture  WBC 8.86  Creat 0.76  Calcitonin and lactic acid normal  Blood cultures pending  Consider ID consult for ongoing intermittent fevers  Medical optimization antibiotics per primary team

## 2023-12-14 NOTE — ASSESSMENT & PLAN NOTE
Patient found to be hypokalemic most likely secondary to diarrhea for about a day yesterday. (Already resolved)  Also has poor p.o. intake (only had a cracker this evening). Repleted with oral and IV potassium. Will recheck metabolic profile.

## 2023-12-14 NOTE — UTILIZATION REVIEW
NOTIFICATION OF INPATIENT ADMISSION   AUTHORIZATION REQUEST   SERVICING FACILITY:   [address] [place] [address]  Address: [address] [place] [address]  Tax ID: [id number]  NPI: [id number] ATTENDING PROVIDER:  Attending Name and NPI#: Salazar Lebala [[id number]]  Address: [address] [place] [address]  Phone: [phone]   ADMISSION INFORMATION:  [facility] [facility]   Place of Service Code: 21  Inpatient Admission Date/Time: 12/14/23  3:01 AM  Discharge Date/Time: No discharge date for patient encounter. Admitting Diagnosis Code/Description:  Hypokalemia [E87.6]  Back pain [M54.9]  Flank pain [R10.9]  Abnormal CT scan, kidney [R93.429]     UTILIZATION REVIEW CONTACT:  Elva Negron, Utilization   Network Utilization Review Department  Phone: [phone]  Fax: [phone]  Email: [email or web address]  Contact for approvals/pending authorizations, clinical reviews, and discharge. PHYSICIAN ADVISORY SERVICES:  Medical Necessity Denial & Bwif-ss-Mujl Review  Phone: [phone]  Fax: [phone]  Email: [email or web address]     DISCHARGE SUPPORT TEAM:  For Patients Discharge Needs & Updates  Phone: [phone] opt. 2 Fax: [phone]  Email: [email or web address]

## 2023-12-14 NOTE — CONSULTS
Consult - Urology   Formerly [facility], [facility] 1965, 66 y.o. female MRN: [id number]    Unit/Bed#: [facility] 934-01 Encounter: [id number]    * Abnormal radiologic findings on diagnostic imaging of renal pelvis, ureter, or bladder  Assessment & Plan  CT: Subtle heterogenous appearance of the kidneys of uncertain etiology (possible perfusion abnormality, timing of contrast enhancement or infiltrative infection/pyelonephritis)  Urine with pyuria, no bacteria  Check urine culture  WBC 8.86  Creat 0.76  Calcitonin and lactic acid normal  Blood cultures pending  Consider ID consult for ongoing intermittent fevers  Medical optimization antibiotics per primary team          Subjective:   42-year-old female who presented to the emergency department 12/13/2023 after developing severe bilateral flank pain she reports was across her back and describes it as "crushing, throbbing". This had sudden onset yesterday. She denies any associated urinary symptoms with this. Denies history of UTI or pyelonephritis. Denies any frequency or urgency. Denies hematuria. No history of kidney stones. Had a prior hysterectomy but still has her ovaries. This was done for excessive bleeding. She reports traveling to [facility] in August.  She has not been around any prisoners or homeless shelters. Denies any unintentional weight loss. She does have history of gastric sleeve and takes Ozempic for weight management as she had gained some weight postoperatively. An occasional cough but does not cough up any blood. She has been having intermittent fevers since the end of September. She reports getting her flu shot in the end of September and then developed the flu within 1 week. She had occasional cough around the time of her flu illness without any hemoptysis. She complains of intermittent fever for the past 2 months. Aches and chills with fatigue, hair loss.   She reports having a bug bite in October that was weird and swollen in appearance she described it as red in the middle and spotty but did not appear to be like a tick bite she never saw a bug but had been outside doing yard work the day before she noticed this. She was tested for Lyme which was negative. She also reports negative COVID testing negative RSV testing negative flu testing with normal TSH levels. She has been reporting excessive thirst since last Thursday. She is swollen lymph nodes on Friday. She vomited on Monday. She had 1 episode of diarrhea on Tuesday for a very large amount of liquid stool. She is normally constipated. Wednesday she had nausea but no vomiting. She feels her fevers have a sudden onset up to 101 and then resolve as quickly as they start. She also notes some cognitive changes where she has difficulty finding words and has been forgetful and cannot always follow conversations. Review of Systems   Constitutional:  Positive for fatigue and fever. Negative for activity change, appetite change, chills and unexpected weight change. Intermittent fevers over the past 2 months up to 101, fatigue, hair loss, swollen lymph nodes   HENT:  Negative for facial swelling. Eyes:  Negative for discharge. Respiratory: Negative. Negative for cough and shortness of breath. Cardiovascular:  Negative for chest pain and leg swelling. Gastrointestinal:  Positive for constipation. Negative for abdominal distention, abdominal pain, diarrhea, nausea and vomiting. Endocrine: Positive for polyphagia. Excessive thirst x 1 week   Genitourinary:  Positive for flank pain. Negative for decreased urine volume, difficulty urinating, dysuria, enuresis, frequency, genital sores, hematuria and urgency. Musculoskeletal:  Positive for back pain. Negative for myalgias. Skin:  Negative for pallor and rash. Allergic/Immunologic: Negative. Negative for immunocompromised state. Neurological:  Negative for facial asymmetry and speech difficulty.         Difficulty following conversations at times, word finding difficulty, forgetfulness   Psychiatric/Behavioral:  Positive for decreased concentration. Negative for agitation and confusion. Objective:  Vitals: Blood pressure 143/79, pulse 74, temperature 98.2 °F (36.8 °C), resp. rate 18, height 5' 4" (1.626 m), weight 83.5 kg (184 lb), SpO2 98%, not currently breastfeeding. ,Body mass index is 31.58 kg/m². Physical Exam  Vitals and nursing note reviewed. Constitutional:       General: She is not in acute distress. Appearance: Normal appearance. She is not ill-appearing, toxic-appearing or diaphoretic. HENT:      Head: Normocephalic. Cardiovascular:      Rate and Rhythm: Normal rate. Pulmonary:      Effort: Pulmonary effort is normal. No respiratory distress. Abdominal:      General: Abdomen is flat. There is no distension. Palpations: Abdomen is soft. Tenderness: There is no abdominal tenderness. There is right CVA tenderness. There is no left CVA tenderness, guarding or rebound. Musculoskeletal:         General: No swelling. Cervical back: Normal range of motion. Skin:     General: Skin is warm and dry. Neurological:      General: No focal deficit present. Mental Status: She is alert and oriented to person, place, and time. Psychiatric:         Mood and Affect: Mood normal.         Behavior: Behavior normal.         Thought Content: Thought content normal.         Judgment: Judgment normal.         Imaging:  CT ABDOMEN AND PELVIS WITH IV CONTRAST     INDICATION:   Abdominal pain, acute, nonlocalized  abdominal discomfort, nausea, vomiting, intermittent fevers, h/o sleeve gastrectomy. COMPARISON:  None. TECHNIQUE:  CT examination of the abdomen and pelvis was performed. Multiplanar 2D reformatted images were created from the source data.      This examination, like all CT scans performed in the [facility], was performed utilizing techniques to minimize radiation dose exposure, including the use of iterative reconstruction and automated exposure control. Radiation dose length   product (DLP) for this visit:  536.99 mGy-cm     IV Contrast:  50 mL of iohexol (OMNIPAQUE) 100 mL of iohexol (OMNIPAQUE)  Enteric Contrast:  Enteric contrast was not administered. FINDINGS:     ABDOMEN     LOWER CHEST:  No clinically significant abnormality identified in the visualized lower chest.     LIVER/BILIARY TREE:  Unremarkable. GALLBLADDER:  Gallbladder is surgically absent. SPLEEN:  Unremarkable. PANCREAS:  Unremarkable. ADRENAL GLANDS:  Unremarkable. KIDNEYS/URETERS: Kidneys are normal in size and position. Too small to characterize hypodensity in the midpole of the right kidney. Subtle heterogeneous appearance of the kidneys are seen of uncertain etiology. Findings may be secondary to perfusion   abnormality, timing of contrast enhancement, or infiltrative infection/pyelonephritis. Recommend clinical correlation with urinalysis for pyelonephritis. No suspicious focal renal mass, nephrolithiasis, hydronephrosis, or perinephric fluid collections   bilaterally. STOMACH AND BOWEL: Small hiatal hernia. Surgical sutures along the gastroesophageal junction region and proximal greater curvature of the stomach likely from prior sleeve gastrectomy. Stomach is slightly distended with oral contrast. No intraluminal mass   or irregular wall thickening. Small and large bowel loops are normal in course and caliber without obstruction or inflammation. Terminal ileum and appendix are grossly unremarkable. APPENDIX:  No findings to suggest appendicitis. ABDOMINOPELVIC CAVITY:  No ascites. No pneumoperitoneum. No lymphadenopathy. VESSELS:  Unremarkable for patient's age. PELVIS     REPRODUCTIVE ORGANS:  Surgical changes of prior hysterectomy. URINARY BLADDER:  Unremarkable. ABDOMINAL WALL/INGUINAL REGIONS:  Unremarkable.      OSSEOUS STRUCTURES: No acute fracture or destructive osseous lesion. IMPRESSION:     1. Heterogeneous appearance of the bilateral kidneys noted of uncertain etiology as discussed above. Recommend correlation with urinalysis for diffuse pyelonephritis. No perinephric fluid collection, nephrolithiasis or hydronephrosis bilaterally. 2.  Small hiatal hernia and postsurgical changes from prior sleeve gastrectomy. No evidence for bowel obstruction, inflammation, appendicitis, free air, or free fluid. Postsurgical changes from prior cholecystectomy and hysterectomy. Workstation performed: [facility]  Imaging reviewed - both report and images personally reviewed.      Labs:  Recent Labs     12/13/23 2327 12/14/23  0926   WBC 10.32* 8.86     Recent Labs     12/13/23 2327 12/14/23  0926   HGB 11.6 13.0       Recent Labs     12/13/23 2327 12/14/23  0618   CREATININE 0.86 0.76       Microbiology:  pending    History:  Social History     Socioeconomic History    Marital status: /Civil Union     Spouse name: None    Number of children: None    Years of education: None    Highest education level: None   Occupational History    None   Tobacco Use    Smoking status: Never    Smokeless tobacco: Never   Vaping Use    Vaping status: Never Used   Substance and Sexual Activity    Alcohol use: Not Currently     Comment: occasional    Drug use: No    Sexual activity: Yes     Partners: Male   Other Topics Concern    None   Social History Narrative    None     Social Determinants of Health     Financial Resource Strain: Not on file   Food Insecurity: Not on file   Transportation Needs: Not on file   Physical Activity: Not on file   Stress: Not on file   Social Connections: Not on file   Intimate Partner Violence: Not on file   Housing Stability: Not on file       Past Medical History:   Diagnosis Date    Acute bronchitis     Anxiety     Arthritis     Asthma     Bariatric surgery status     Continuous positive airway pressure dependence COVID-19 vaccine series completed 2021    CPAP (continuous positive airway pressure) dependence     Depression     Diabetes mellitus     GERD (gastroesophageal reflux disease)     Hyperlipidemia     Hypertension     Morbid obesity     Postsurgical malabsorption     Sleep apnea     USES C PAP     Past Surgical History:   Procedure Laterality Date    BREAST SURGERY      bilat lumpectomy     SECTION      twice    CHOLECYSTECTOMY      EGD      HYSTERECTOMY      KNEE ARTHROSCOPY Bilateral     IN LAPS GSTRC RSTRICTIV PX LONGITUDINAL GASTRECTOMY N/A 10/21/2019    Procedure: LAPAROSCOPIC SLEEVE GASTRECTOMY AND INTRAOPERATIVE EGD;  Surgeon: Zara Carcamo MD;  Location: [facility];  Service: Bariatrics    WISDOM TOOTH EXTRACTION       Family History   Problem Relation Age of Onset    Breast cancer Mother     Hypertension Mother     Brain cancer Mother     Prostate cancer Father     Melanoma Father     Diabetes Father     Hypertension Father     Heart disease Father     Skin cancer Father     Thyroid disease Neg Hx     Stroke Neg Hx        The following portions of the patient's history were reviewed and updated as appropriate: allergies, current medications, past family history, past medical history, past social history, past surgical history and problem list    JOSE ALEJANDRO Velázquez  Date: 2023 Time: 1:39 PM

## 2023-12-14 NOTE — ED PROVIDER NOTES
History  Chief Complaint   Patient presents with    Back Pain     Pt states she started with "shooting" back pain today bilaterally. States she's also been seen for an infection but unsure of the cause. States she had one episode of vomiting and diarrhea. 61-year-old female with history of sleeve gastrectomy, diabetes, presents to the emergency department due to flank pain radiating down to her lower back, nausea with an episode of nonbloody nonbilious emesis, and diarrhea starting today. Patient also notes that over the last 6 to 8 weeks she has been having intermittent episodes of fevers, fatigue, and cold sweats. She has measured her temperature during these episodes and is noted it to be as high as 101.2. Seems to occur throughout the day and then when she wakes up in the morning she will be covered in sweat but when her symptoms will have improved. Has been drinking a lot more water than she typically does. Denies cp, cough, congestion, urinary symptoms, or other complaints. Had a tick bite with rash early on but had negative followup testing. Prior to Admission Medications   Prescriptions Last Dose Informant Patient Reported? Taking?    ALPRAZolam (XANAX) 0.25 mg tablet Past Month  Yes Yes   Sig: Take 0.25 mg by mouth daily at bedtime as needed   Sodium Hyaluronate 20 MG/2ML SOSY More than a month  Yes No   albuterol (PROVENTIL HFA,VENTOLIN HFA) 90 mcg/act inhaler Not Taking Self Yes No   Sig: Inhale 2 puffs as needed   Patient not taking: Reported on 12/14/2023   amLODIPine (NORVASC) 10 mg tablet 12/13/2023  Yes Yes   Sig: Take 10 mg by mouth daily   chlorthalidone 25 mg tablet 12/13/2023  Yes Yes   diclofenac sodium (VOLTAREN) 1 % Past Month  Yes Yes   Sig: Apply 2 g topically 3 (three) times a day   glucose blood test strip Past Week  Yes Yes   glucose blood test strip Past Week  Yes Yes   lamoTRIgine (LaMICtal) 200 MG tablet 12/13/2023 Self Yes Yes   Sig: Take 200 mg by mouth daily    losartan (COZAAR) 100 MG tablet 2023  Yes Yes   multivitamin (THERAGRAN) TABS Past Month  Yes Yes   Sig: Take 1 tablet by mouth daily   omeprazole (PriLOSEC) 20 mg delayed release capsule 2023  No Yes   Sig: Take 1 capsule (20 mg total) by mouth 2 (two) times a day   semaglutide, 2 mg/dose, (Ozempic, 2 MG/DOSE,) 8 mg/ mL injection pen Past Week  No Yes   Sig: Inject 0.75 mL (2 mg total) under the skin every 7 days   venlafaxine (EFFEXOR-XR) 150 mg 24 hr capsule 2023  Yes Yes   venlafaxine (EFFEXOR-XR) 75 mg 24 hr capsule 2023 Self Yes Yes   Sig: Take 75 mg by mouth daily      Facility-Administered Medications: None       Past Medical History:   Diagnosis Date    Acute bronchitis     Anxiety     Arthritis     Asthma     Bariatric surgery status     Continuous positive airway pressure dependence     COVID-19 vaccine series completed 2021    CPAP (continuous positive airway pressure) dependence     Depression     Diabetes mellitus     GERD (gastroesophageal reflux disease)     Hyperlipidemia     Hypertension     Morbid obesity     Postsurgical malabsorption     Sleep apnea     USES C PAP       Past Surgical History:   Procedure Laterality Date    BREAST SURGERY      bilat lumpectomy     SECTION      twice    CHOLECYSTECTOMY      EGD      HYSTERECTOMY      KNEE ARTHROSCOPY Bilateral     WA LAPS GSTRC RSTRICTIV PX LONGITUDINAL GASTRECTOMY N/A 10/21/2019    Procedure: LAPAROSCOPIC SLEEVE GASTRECTOMY AND INTRAOPERATIVE EGD;  Surgeon: Dolores Quintanilla MD;  Location: [facility] OR;  Service: Bariatrics    WISDOM TOOTH EXTRACTION         Family History   Problem Relation Age of Onset    Breast cancer Mother     Hypertension Mother     Brain cancer Mother     Prostate cancer Father     Melanoma Father     Diabetes Father     Hypertension Father     Heart disease Father     Skin cancer Father     Thyroid disease Neg Hx     Stroke Neg Hx      I have reviewed and agree with the history as documented. E-Cigarette/Vaping    E-Cigarette Use Never User      E-Cigarette/Vaping Substances    Nicotine No     THC No     CBD No     Flavoring No     Other No     Unknown No      Social History     Tobacco Use    Smoking status: Never    Smokeless tobacco: Never   Vaping Use    Vaping status: Never Used   Substance Use Topics    Alcohol use: Not Currently     Comment: occasional    Drug use: No        Review of Systems   All other systems reviewed and are negative. Physical Exam  ED Triage Vitals   Temperature Pulse Respirations Blood Pressure SpO2   12/13/23 2214 12/13/23 2208 12/13/23 2208 12/13/23 2208 12/13/23 2208   98.6 °F (37 °C) 77 18 (!) 136/33 98 %      Temp Source Heart Rate Source Patient Position - Orthostatic VS BP Location FiO2 (%)   12/13/23 2214 12/14/23 0130 -- -- --   Oral Monitor         Pain Score       12/13/23 2303       7             Orthostatic Vital Signs  Vitals:    12/13/23 2208 12/14/23 0130 12/14/23 0404   BP: (!) 136/33 126/69 121/71   Pulse: 77 62 68       Physical Exam  Vitals and nursing note reviewed. Constitutional:       General: She is not in acute distress. Appearance: She is well-developed. HENT:      Head: Normocephalic and atraumatic. Eyes:      Conjunctiva/sclera: Conjunctivae normal.   Cardiovascular:      Rate and Rhythm: Normal rate and regular rhythm. Heart sounds: No murmur heard. Pulmonary:      Effort: Pulmonary effort is normal. No respiratory distress. Breath sounds: Normal breath sounds. Abdominal:      Palpations: Abdomen is soft. Tenderness: There is abdominal tenderness (mild, ruq/epigastric). There is right CVA tenderness. There is no left CVA tenderness. Musculoskeletal:         General: No swelling. Cervical back: Neck supple. Skin:     General: Skin is warm and dry. Capillary Refill: Capillary refill takes less than 2 seconds. Neurological:      General: No focal deficit present.       Mental Status: She is alert.    Psychiatric:         Mood and Affect: Mood normal.         ED Medications  Medications   albuterol (PROVENTIL HFA,VENTOLIN HFA) inhaler 2 puff (has no administration in time range)   ALPRAZolam (XANAX) tablet 0.25 mg (has no administration in time range)   amLODIPine (NORVASC) tablet 10 mg (has no administration in time range)   Diclofenac Sodium (VOLTAREN) 1 % topical gel 2 g (has no administration in time range)   lamoTRIgine (LaMICtal) tablet 200 mg (has no administration in time range)   losartan (COZAAR) tablet 100 mg (has no administration in time range)   multivitamin stress formula tablet 1 tablet (has no administration in time range)   pantoprazole (PROTONIX) EC tablet 20 mg (20 mg Oral Given 12/14/23 0505)   venlafaxine (EFFEXOR-XR) 24 hr capsule 225 mg (has no administration in time range)   multi-electrolyte (PLASMALYTE-A/ISOLYTE-S PH 7.4) IV solution (125 mL/hr Intravenous New Bag 12/14/23 7855)   acetaminophen (TYLENOL) tablet 650 mg (has no administration in time range)   docusate sodium (COLACE) capsule 100 mg (has no administration in time range)   ondansetron (ZOFRAN) injection 4 mg (has no administration in time range)   aluminum-magnesium hydroxide-simethicone (MAALOX) oral suspension 30 mL (has no administration in time range)   enoxaparin (LOVENOX) subcutaneous injection 40 mg (has no administration in time range)   insulin lispro (HumaLOG) 100 units/mL subcutaneous injection 1-6 Units (has no administration in time range)   insulin lispro (HumaLOG) 100 units/mL subcutaneous injection 1-5 Units (has no administration in time range)   morphine injection 2 mg (has no administration in time range)   sodium chloride 0.9 % bolus 1,000 mL (0 mL Intravenous Stopped 12/14/23 0039)   acetaminophen (TYLENOL) tablet 975 mg (975 mg Oral Given 12/13/23 2303)   iohexol (OMNIPAQUE) 240 MG/ML solution 50 mL (50 mL Oral Given 12/13/23 2335)   potassium chloride (K-DUR,KLOR-CON) CR tablet 40 mEq (40 mEq Oral Given 12/14/23 0040)   potassium chloride 20 mEq IVPB (premix) (0 mEq Intravenous Stopped 12/14/23 0334)   iohexol (OMNIPAQUE) 350 MG/ML injection (SINGLE-DOSE) 100 mL (100 mL Intravenous Given 12/14/23 0059)   morphine injection 4 mg (4 mg Intravenous Given 12/14/23 0245)       Diagnostic Studies  Results Reviewed       Procedure Component Value Units Date/Time    CBC and differential [[id number]] Updated: 12/14/23 0727    Lab Status: No result Specimen: Blood from Arm, Left     Hemoglobin A1c w/EAG Estimation (Orders if not completed within the last 90 days) [[id number]] Collected: 12/14/23 0617    Lab Status:  In process Specimen: Blood from Arm, Left Updated: 12/14/23 0629    Procalcitonin [[id number]]  (Normal) Collected: 12/13/23 2327    Lab Status: Final result Specimen: Blood from Arm, Right Updated: 12/14/23 0530     Procalcitonin 0.20 ng/ml     Blood culture [[id number]]     Lab Status: No result Specimen: Blood     Blood culture [[id number]]     Lab Status: No result Specimen: Blood     Comprehensive metabolic panel [[id number]]  (Abnormal) Collected: 12/13/23 2327    Lab Status: Final result Specimen: Blood from Arm, Right Updated: 12/14/23 0020     Sodium 139 mmol/L      Potassium 2.6 mmol/L      Chloride 97 mmol/L      CO2 33 mmol/L      ANION GAP 9 mmol/L      BUN 12 mg/dL      Creatinine 0.86 mg/dL      Glucose 111 mg/dL      Calcium 8.9 mg/dL      Corrected Calcium 9.4 mg/dL      AST 17 U/L      ALT 22 U/L      Alkaline Phosphatase 90 U/L      Total Protein 6.8 g/dL      Albumin 3.4 g/dL      Total Bilirubin 0.42 mg/dL      eGFR 75 ml/min/1.73sq m     Narrative:      [place] guidelines for Chronic Kidney Disease (CKD):     Stage 1 with normal or high GFR (GFR > 90 mL/min/1.73 square meters)    Stage 2 Mild CKD (GFR = 60-89 mL/min/1.73 square meters)    Stage 3A Moderate CKD (GFR = 45-59 mL/min/1.73 square meters)    Stage 3B Moderate CKD (GFR = 30-44 mL/min/1.73 square meters)    Stage 4 Severe CKD (GFR = 15-29 mL/min/1.73 square meters)    Stage 5 End Stage CKD (GFR <15 mL/min/1.73 square meters)  Note: GFR calculation is accurate only with a steady state creatinine    Lipase [[id number]]  (Normal) Collected: 12/13/23 2327    Lab Status: Final result Specimen: Blood from Arm, Right Updated: 12/14/23 0014     Lipase 25 u/L     Lactic acid, plasma (w/reflex if result > 2.0) [[id number]]  (Normal) Collected: 12/13/23 2327    Lab Status: Final result Specimen: Blood from Arm, Right Updated: 12/14/23 0000     LACTIC ACID 1.4 mmol/L     Narrative:      Result may be elevated if tourniquet was used during collection. Urine Microscopic [[id number]]  (Abnormal) Collected: 12/13/23 2305    Lab Status: Final result Specimen: Urine, Clean Catch Updated: 12/13/23 2346     RBC, UA None Seen /hpf      WBC, UA 4-10 /hpf      Epithelial Cells Occasional /hpf      Bacteria, UA None Seen /hpf     CBC and differential [[id number]]  (Abnormal) Collected: 12/13/23 2327    Lab Status: Final result Specimen: Blood from Arm, Right Updated: 12/13/23 2343     WBC 10.32 Thousand/uL      RBC 4.19 Million/uL      Hemoglobin 11.6 g/dL      Hematocrit 34.6 %      MCV 83 fL      MCH 27.7 pg      MCHC 33.5 g/dL      RDW 12.8 %      MPV 8.3 fL      Platelets 122 Thousands/uL      nRBC 0 /100 WBCs      Neutrophils Relative 65 %      Immat GRANS % 0 %      Lymphocytes Relative 22 %      Monocytes Relative 11 %      Eosinophils Relative 2 %      Basophils Relative 0 %      Neutrophils Absolute 6.64 Thousands/µL      Immature Grans Absolute 0.04 Thousand/uL      Lymphocytes Absolute 2.24 Thousands/µL      Monocytes Absolute 1.15 Thousand/µL      Eosinophils Absolute 0.21 Thousand/µL      Basophils Absolute 0.04 Thousands/µL     Blood culture #2 [[id number]] Collected: 12/13/23 2327    Lab Status:  In process Specimen: Blood from Arm, Right Updated: 12/13/23 2334    Blood culture #1 [[id number]] Collected: 12/13/23 2327    Lab Status: In process Specimen: Blood from Arm, Right Updated: 12/13/23 2334    Urine Macroscopic, POC [[id number]]  (Abnormal) Collected: 12/13/23 2305    Lab Status: Final result Specimen: Urine Updated: 12/13/23 2307     Color, UA Yellow     Clarity, UA Clear     pH, UA 6.0     Leukocytes, UA Small     Nitrite, UA Negative     Protein, UA Negative mg/dl      Glucose, UA Negative mg/dl      Ketones, UA Negative mg/dl      Urobilinogen, UA 0.2 E.U./dl      Bilirubin, UA Negative     Occult Blood, UA Trace     Specific Gravity, UA 1.015    Narrative:      [facility] RESULT                   CT abdomen pelvis with contrast   Final Result by Bharat Sharif MD (12/14 0200)      1. Heterogeneous appearance of the bilateral kidneys noted of uncertain etiology as discussed above. Recommend correlation with urinalysis for diffuse pyelonephritis. No perinephric fluid collection, nephrolithiasis or hydronephrosis bilaterally. 2.  Small hiatal hernia and postsurgical changes from prior sleeve gastrectomy. No evidence for bowel obstruction, inflammation, appendicitis, free air, or free fluid. Postsurgical changes from prior cholecystectomy and hysterectomy. Workstation performed: [id number]               Procedures  Procedures      ED Course  ED Course as of 12/14/23 0735   Thu Dec 14, 2023   0024 Potassium(!!): 2.6  Repleting with IV and PO                                       Medical Decision Making  63-year-old female with constellation of symptoms concerning for possible infectious versus endocrine versus multiple other possible etiologies. Will obtain labs to evaluate for infectious etiology/metabolic disorders. CT abdomen pelvis to evaluate for any intra-abdominal pathology that can explain current symptoms such as abscess, leak from prior surgery, pyelonephritis, or other intra-abdominal infectious pathology. Ultimately, workup showing hypokalemia which was repleted with both IV and p.o. potassium. Also having abnormal CT findings with heterogeneous appearance of her kidneys. Unclear whether this is inflammatory, poor contrast phase, or infectious which does not seem likely given her UA is generally unremarkable. Ultimately unclear why patient is having the symptoms with acute changes in her electrolytes. Offered admission for expedited evaluation versus discharge with referrals to specialties. Patient noting that symptoms have become progressively more debilitating so she would prefer admission. Discussed with internal medicine. Patient admitted for further evaluation management. Amount and/or Complexity of Data Reviewed  Labs: ordered. Decision-making details documented in ED Course. Radiology: ordered. Risk  OTC drugs. Prescription drug management. Decision regarding hospitalization. Disposition  Final diagnoses:   Hypokalemia   Flank pain   Abnormal CT scan, kidney     Time reflects when diagnosis was documented in both MDM as applicable and the Disposition within this note       Time User Action Codes Description Comment    12/14/2023 12:22 AM Adonay Herrera Add [E87.6] Hypokalemia     12/14/2023  2:40 AM Kamilah Nelson Add [R10.9] Flank pain     12/14/2023  2:40 AM Nitza Ward Add [R93.429] Abnormal CT scan, kidney           ED Disposition       ED Disposition   Admit    Condition   Stable    Date/Time   Thu Dec 14, 2023  2:39 AM    Comment   Case was discussed with praful and the patient's admission status was agreed to be Admission Status: inpatient status to the service of [facility] .                Follow-up Information    None         Current Discharge Medication List        CONTINUE these medications which have NOT CHANGED    Details   ALPRAZolam (XANAX) 0.25 mg tablet Take 0.25 mg by mouth daily at bedtime as needed      amLODIPine (NORVASC) 10 mg tablet Take 10 mg by mouth daily      chlorthalidone 25 mg tablet       diclofenac sodium (VOLTAREN) 1 % Apply 2 g topically 3 (three) times a day      !! glucose blood test strip       !! glucose blood test strip       lamoTRIgine (LaMICtal) 200 MG tablet Take 200 mg by mouth daily       losartan (COZAAR) 100 MG tablet       multivitamin (THERAGRAN) TABS Take 1 tablet by mouth daily      omeprazole (PriLOSEC) 20 mg delayed release capsule Take 1 capsule (20 mg total) by mouth 2 (two) times a day  Qty: 180 capsule, Refills: 2    Associated Diagnoses: Status post digestive system surgery; Heart burn      semaglutide, 2 mg/dose, (Ozempic, 2 MG/DOSE,) 8 mg/ mL injection pen Inject 0.75 mL (2 mg total) under the skin every 7 days  Qty: 3 mL, Refills: 2    Associated Diagnoses: Diabetes mellitus, new onset ([address])      ! ! venlafaxine (EFFEXOR-XR) 150 mg 24 hr capsule       !! venlafaxine (EFFEXOR-XR) 75 mg 24 hr capsule Take 75 mg by mouth daily      albuterol (PROVENTIL HFA,VENTOLIN HFA) 90 mcg/act inhaler Inhale 2 puffs as needed      Sodium Hyaluronate 20 MG/2ML SOSY        !! - Potential duplicate medications found. Please discuss with provider. No discharge procedures on file. PDMP Review       None             ED Provider  Attending physically available and evaluated [facility]. I managed the patient along with the ED Attending.     Electronically Signed by           Ene Lewis MD  12/14/23 2786

## 2023-12-14 NOTE — PLAN OF CARE
Problem: PAIN - ADULT  Goal: Verbalizes/displays adequate comfort level or baseline comfort level  Description: Interventions:  - Encourage patient to monitor pain and request assistance  - Assess pain using appropriate pain scale  - Administer analgesics based on type and severity of pain and evaluate response  - Implement non-pharmacological measures as appropriate and evaluate response  - Consider cultural and social influences on pain and pain management  - Notify physician/advanced practitioner if interventions unsuccessful or patient reports new pain  Outcome: Progressing     Problem: INFECTION - ADULT  Goal: Absence or prevention of progression during hospitalization  Description: INTERVENTIONS:  - Assess and monitor for signs and symptoms of infection  - Monitor lab/diagnostic results  - Monitor all insertion sites, i.e. indwelling lines, tubes, and drains  - Monitor endotracheal if appropriate and nasal secretions for changes in amount and color  - [place] appropriate cooling/warming therapies per order  - Administer medications as ordered  - Instruct and encourage patient and family to use good hand hygiene technique  - Identify and instruct in appropriate isolation precautions for identified infection/condition  Outcome: Progressing  Goal: Absence of fever/infection during neutropenic period  Description: INTERVENTIONS:  - Monitor WBC    Outcome: Progressing     Problem: SAFETY ADULT  Goal: Patient will remain free of falls  Description: INTERVENTIONS:  - Educate patient/family on patient safety including physical limitations  - Instruct patient to call for assistance with activity   - Consult OT/PT to assist with strengthening/mobility   - Keep Call bell within reach  - Keep bed low and locked with side rails adjusted as appropriate  - Keep care items and personal belongings within reach  - Initiate and maintain comfort rounds  - Make Fall Risk Sign visible to staff  - Apply yellow socks and bracelet for high fall risk patients  - Consider moving patient to room near nurses station  Outcome: Progressing  Goal: Maintain or return to baseline ADL function  Description: INTERVENTIONS:  -  Assess patient's ability to carry out ADLs; assess patient's baseline for ADL function and identify physical deficits which impact ability to perform ADLs (bathing, care of mouth/teeth, toileting, grooming, dressing, etc.)  - Assess/evaluate cause of self-care deficits   - Assess range of motion  - Assess patient's mobility; develop plan if impaired  - Assess patient's need for assistive devices and provide as appropriate  - Encourage maximum independence but intervene and supervise when necessary  - Involve family in performance of ADLs  - Assess for home care needs following discharge   - Consider OT consult to assist with ADL evaluation and planning for discharge  - Provide patient education as appropriate  Outcome: Progressing  Goal: Maintains/Returns to pre admission functional level  Description: INTERVENTIONS:  - Perform AM-PAC 6 Click Basic Mobility/ Daily Activity assessment daily.  - Set and communicate daily mobility goal to care team and patient/family/caregiver.    - Collaborate with rehabilitation services on mobility goals if consulted  - Out of bed for toileting  - Record patient progress and toleration of activity level   Outcome: Progressing     Problem: DISCHARGE PLANNING  Goal: Discharge to home or other facility with appropriate resources  Description: INTERVENTIONS:  - Identify barriers to discharge w/patient and caregiver  - Arrange for needed discharge resources and transportation as appropriate  - Identify discharge learning needs (meds, wound care, etc.)  - Arrange for interpretive services to assist at discharge as needed  - Refer to Case Management Department for coordinating discharge planning if the patient needs post-hospital services based on physician/advanced practitioner order or complex needs related to functional status, cognitive ability, or social support system  Outcome: Progressing     Problem: Knowledge Deficit  Goal: Patient/family/caregiver demonstrates understanding of disease process, treatment plan, medications, and discharge instructions  Description: Complete learning assessment and assess knowledge base.   Interventions:  - Provide teaching at level of understanding  - Provide teaching via preferred learning methods  Outcome: Progressing

## 2023-12-14 NOTE — RESTORATIVE TECHNICIAN NOTE
Restorative Technician Note      Patient Name: Irais Granados [facility] Visit Date: 12/14/23  Note Type: Mobility  Patient Position Upon Consult: Standing  Activity Performed: Ambulated  Assistive Device: Other (Comment) (IV Pole)  Patient Position at End of Consult: Supine;  All needs within reach    Kisha Fuel, Restorative Technician

## 2023-12-14 NOTE — ED ATTENDING ATTESTATION
12/13/2023  IJanet MD, saw and evaluated the patient. I have discussed the patient with the resident/non-physician practitioner and agree with the resident's/non-physician practitioner's findings, Plan of Care, and MDM as documented in the resident's/non-physician practitioner's note, except where noted. All available labs and Radiology studies were reviewed. I was present for key portions of any procedure(s) performed by the resident/non-physician practitioner and I was immediately available to provide assistance. At this point I agree with the current assessment done in the Emergency Department. I have conducted an independent evaluation of this patient a history and physical is as follows:    57-year-old female with a history of sleeve gastrectomy and diabetes presents to the emergency department for evaluation of bilateral flank pain and generally feeling unwell. She reports for the past month or so has been dealing with intermittent fevers with associated chills and fatigue. The symptoms typically last a few hours and then improve by the following morning. She denies any dysuria or hematuria. No history of IV drug use. No numbness or tingling. Has had negative Lyme testing. No chest pain, shortness of breath, congestion. On exam, patient was comfortably bed in no acute distress, and is normocephalic atraumatic, pupils equal round reactive to light, neck is supple without meningismus signs, heart is regular rate and rhythm with intact distal pulses, no increased work of breathing, respiratory distress, or stridor. Abdomen is soft, but tender without rebound or guarding. She does have right CVA tenderness. Differential diagnosis includes but is not limited to urinary tract infection, ureterolithiasis, transient bacteremia, biliary disease, pancreatitis, metabolic derangement. Plan to check abdominal labs, lactic acid, blood cultures.   Will get CT scan of the abdomen and pelvis to further evaluate. Will treat symptomatically. Labs remarkable for hypokalemia and elevated bicarb, but were otherwise unremarkable. Urinalysis negative for infection. CT scan demonstrates heterogeneous appearance of bilateral kidneys of uncertain etiology. Per review of urinalysis, this does not appear to be secondary to pyelonephritis.     Although workup is relatively unremarkable, as patient has been having intermittent fevers for some time, will admit to medicine for further management and evaluation      ED Course  ED Course as of 12/14/23 0242   Wed Dec 13, 2023   2347 Bacteria, UA: None Seen   Thu Dec 14, 2023   0022 Potassium(!!): 2.6         Critical Care Time  Procedures

## 2023-12-14 NOTE — ASSESSMENT & PLAN NOTE
Patient reports 6 to 8 weeks of intermittent fevers as high as 101°F lasting for approximately the whole day to about 3 to 5 days. Denies any dysuria or hesitancy or any previous urologic abnormality. No episode of fevers for more than 48 hours. However claims to have new onset bilateral flank pain. Currently not tender when palpated. CT scan of the abdomen showing heterogenous appearance of bilateral kidneys to correlate with possible pyelonephritis. Of note patient does not have fever and urinalysis is unremarkable. BUN/creatinine are both within normal and same as previous. Questionable urologic abnormality such as urinary reflux causing renal findings in CT of the abdomen and pelvis. Urology consult for opinion. At this point, will not start antibiotics. Patient is afebrile and white count normal.  Will get procalcitonin. In the event patient has fever of greater than 100.3 °F; repeat blood cultures and consider starting patient on antibiotics (Rocephin?). Prior to admission, blood cultures have been ordered by ER x 2.

## 2023-12-15 VITALS
RESPIRATION RATE: 18 BRPM | HEART RATE: 67 BPM | BODY MASS INDEX: 31.41 KG/M2 | HEIGHT: 64 IN | DIASTOLIC BLOOD PRESSURE: 76 MMHG | WEIGHT: 184 LBS | OXYGEN SATURATION: 99 % | TEMPERATURE: 99.1 F | SYSTOLIC BLOOD PRESSURE: 120 MMHG

## 2023-12-15 LAB
ANA SER QL IA: POSITIVE
ANION GAP SERPL CALCULATED.3IONS-SCNC: 8 MMOL/L
BUN SERPL-MCNC: 6 MG/DL (ref 5–25)
CALCIUM SERPL-MCNC: 8.8 MG/DL (ref 8.4–10.2)
CHLORIDE SERPL-SCNC: 99 MMOL/L (ref 96–108)
CO2 SERPL-SCNC: 36 MMOL/L (ref 21–32)
CORTIS AM PEAK SERPL-MCNC: 12.9 UG/DL (ref 6.7–22.6)
CREAT SERPL-MCNC: 0.84 MG/DL (ref 0.6–1.3)
ERYTHROCYTE [DISTWIDTH] IN BLOOD BY AUTOMATED COUNT: 12.6 % (ref 11.6–15.1)
ERYTHROCYTE [SEDIMENTATION RATE] IN BLOOD: 76 MM/HOUR (ref 0–29)
GFR SERPL CREATININE-BSD FRML MDRD: 77 ML/MIN/1.73SQ M
GLUCOSE SERPL-MCNC: 102 MG/DL (ref 65–140)
GLUCOSE SERPL-MCNC: 106 MG/DL (ref 65–140)
GLUCOSE SERPL-MCNC: 108 MG/DL (ref 65–140)
HCT VFR BLD AUTO: 34.1 % (ref 34.8–46.1)
HGB BLD-MCNC: 11.1 G/DL (ref 11.5–15.4)
MCH RBC QN AUTO: 27.8 PG (ref 26.8–34.3)
MCHC RBC AUTO-ENTMCNC: 32.6 G/DL (ref 31.4–37.4)
MCV RBC AUTO: 86 FL (ref 82–98)
PLATELET # BLD AUTO: 367 THOUSANDS/UL (ref 149–390)
PMV BLD AUTO: 8.3 FL (ref 8.9–12.7)
POTASSIUM SERPL-SCNC: 2.9 MMOL/L (ref 3.5–5.3)
RBC # BLD AUTO: 3.99 MILLION/UL (ref 3.81–5.12)
RHEUMATOID FACT SER QL LA: NEGATIVE
SODIUM SERPL-SCNC: 143 MMOL/L (ref 135–147)
WBC # BLD AUTO: 7.18 THOUSAND/UL (ref 4.31–10.16)

## 2023-12-15 PROCEDURE — 99239 HOSP IP/OBS DSCHRG MGMT >30: CPT | Performed by: STUDENT IN AN ORGANIZED HEALTH CARE EDUCATION/TRAINING PROGRAM

## 2023-12-15 PROCEDURE — 99232 SBSQ HOSP IP/OBS MODERATE 35: CPT | Performed by: NURSE PRACTITIONER

## 2023-12-15 PROCEDURE — 85652 RBC SED RATE AUTOMATED: CPT | Performed by: STUDENT IN AN ORGANIZED HEALTH CARE EDUCATION/TRAINING PROGRAM

## 2023-12-15 PROCEDURE — 80048 BASIC METABOLIC PNL TOTAL CA: CPT | Performed by: STUDENT IN AN ORGANIZED HEALTH CARE EDUCATION/TRAINING PROGRAM

## 2023-12-15 PROCEDURE — 86430 RHEUMATOID FACTOR TEST QUAL: CPT | Performed by: STUDENT IN AN ORGANIZED HEALTH CARE EDUCATION/TRAINING PROGRAM

## 2023-12-15 PROCEDURE — 82948 REAGENT STRIP/BLOOD GLUCOSE: CPT

## 2023-12-15 PROCEDURE — 85027 COMPLETE CBC AUTOMATED: CPT | Performed by: STUDENT IN AN ORGANIZED HEALTH CARE EDUCATION/TRAINING PROGRAM

## 2023-12-15 PROCEDURE — 86039 ANTINUCLEAR ANTIBODIES (ANA): CPT | Performed by: STUDENT IN AN ORGANIZED HEALTH CARE EDUCATION/TRAINING PROGRAM

## 2023-12-15 PROCEDURE — 86038 ANTINUCLEAR ANTIBODIES: CPT | Performed by: STUDENT IN AN ORGANIZED HEALTH CARE EDUCATION/TRAINING PROGRAM

## 2023-12-15 PROCEDURE — 82533 TOTAL CORTISOL: CPT | Performed by: STUDENT IN AN ORGANIZED HEALTH CARE EDUCATION/TRAINING PROGRAM

## 2023-12-15 RX ORDER — POTASSIUM CHLORIDE 20 MEQ/1
40 TABLET, EXTENDED RELEASE ORAL ONCE
Status: COMPLETED | OUTPATIENT
Start: 2023-12-15 | End: 2023-12-15

## 2023-12-15 RX ORDER — LEVOFLOXACIN 750 MG/1
750 TABLET, FILM COATED ORAL EVERY 24 HOURS
Status: DISCONTINUED | OUTPATIENT
Start: 2023-12-15 | End: 2023-12-15 | Stop reason: HOSPADM

## 2023-12-15 RX ORDER — LEVOFLOXACIN 750 MG/1
750 TABLET, FILM COATED ORAL EVERY 24 HOURS
Qty: 9 TABLET | Refills: 0 | Status: SHIPPED | OUTPATIENT
Start: 2023-12-16 | End: 2023-12-25

## 2023-12-15 RX ORDER — POTASSIUM CHLORIDE 20 MEQ/1
20 TABLET, EXTENDED RELEASE ORAL DAILY
Qty: 30 TABLET | Refills: 0 | Status: SHIPPED | OUTPATIENT
Start: 2023-12-15 | End: 2024-01-14

## 2023-12-15 RX ADMIN — LEVOFLOXACIN 750 MG: 750 TABLET, FILM COATED ORAL at 10:24

## 2023-12-15 RX ADMIN — SODIUM CHLORIDE, SODIUM GLUCONATE, SODIUM ACETATE, POTASSIUM CHLORIDE, MAGNESIUM CHLORIDE, SODIUM PHOSPHATE, DIBASIC, AND POTASSIUM PHOSPHATE 125 ML/HR: .53; .5; .37; .037; .03; .012; .00082 INJECTION, SOLUTION INTRAVENOUS at 04:39

## 2023-12-15 RX ADMIN — VENLAFAXINE HYDROCHLORIDE 225 MG: 150 CAPSULE, EXTENDED RELEASE ORAL at 09:10

## 2023-12-15 RX ADMIN — POTASSIUM CHLORIDE 40 MEQ: 1500 TABLET, EXTENDED RELEASE ORAL at 12:14

## 2023-12-15 RX ADMIN — POTASSIUM CHLORIDE 40 MEQ: 1500 TABLET, EXTENDED RELEASE ORAL at 09:12

## 2023-12-15 RX ADMIN — LAMOTRIGINE 200 MG: 100 TABLET ORAL at 09:10

## 2023-12-15 RX ADMIN — B-COMPLEX W/ C & FOLIC ACID TAB 1 TABLET: TAB at 09:10

## 2023-12-15 RX ADMIN — PANTOPRAZOLE SODIUM 20 MG: 20 TABLET, DELAYED RELEASE ORAL at 05:10

## 2023-12-15 NOTE — PROGRESS NOTES
Progress Note - Irais Escobar 66 y.o. female MRN: [id number]    Unit/Bed#: [facility] 934-01 Encounter: [id number]      Assessment:  Irais Escobar is a 17-year-old female reporting several weeks of low-grade temperatures and malaise not accompanied by flank, abdominal, suprapubic pain, dysuria or gross hematuria. Seen in urologic consultation against abnormal CT findings demonstrating subtle heterogeneous appearance of bilateral renal units, of unclear etiology potentially suggesting perfusion abnormality versus infiltrative pyelonephritis. Patient is afebrile, hemodynamically stable without complaints. She reports substantial improvement in constitutional symptoms. She is without leukocytosis or acute kidney injury. Blood cultures are negative within the past 24 hours. Urine cultures are still pending. Plan:  Continue medical optimization and empiric antibiosis. Given no evidence of  tract obstruction or abscess formation, there is no indication for acute/urgent or emergent  surgical intervention. However, for completeness of workup with CT urogram is recommended in several weeks posttreatment for reevaluation. Our service will contact patient with hospital follow-up. We have ordered CT urogram to be completed prior to office visit for review. Urology will sign off. Subjective:   Reports feeling improved. Denies fever, chills, flank, abdominal, suprapubic pain, dysuria or gross hematuria. Objective:     Vitals: Blood pressure 107/55, pulse 64, temperature 98.5 °F (36.9 °C), resp. rate 18, height 5' 4" (1.626 m), weight 83.5 kg (184 lb), SpO2 96%, not currently breastfeeding. ,Body mass index is 31.58 kg/m².       Intake/Output Summary (Last 24 hours) at 12/15/2023 1115  Last data filed at 12/14/2023 2039  Gross per 24 hour   Intake 2080 ml   Output --   Net 2080 ml       Physical Exam: General appearance: alert and oriented, in no acute distress, appears stated age, cooperative, and no distress  Head: Normocephalic, without obvious abnormality, atraumatic  Neck: no JVD and supple, symmetrical, trachea midline  Lungs: diminished breath sounds  Heart: regular rate and rhythm, S1, S2 normal, no murmur, click, rub or gallop  Abdomen: soft, non-tender; bowel sounds normal; no masses,  no organomegaly  Extremities: extremities normal, warm and well-perfused; no cyanosis, clubbing, or edema  Pulses: 2+ and symmetric  Neurologic: Grossly normal     Invasive Devices       Peripheral Intravenous Line  Duration             Peripheral IV 12/13/23 Right;Ventral (anterior) Forearm 2 days                  Lab Results   Component Value Date    WBC 7.18 12/15/2023    HGB 11.1 (L) 12/15/2023    HCT 34.1 (L) 12/15/2023    MCV 86 12/15/2023     12/15/2023     Lab Results   Component Value Date    SODIUM 143 12/15/2023    K 2.9 (L) 12/15/2023    CL 99 12/15/2023    CO2 36 (H) 12/15/2023    BUN 6 12/15/2023    CREATININE 0.84 12/15/2023    GLUC 108 12/15/2023    CALCIUM 8.8 12/15/2023       Lab, Imaging and other studies: I have personally reviewed pertinent reports.

## 2023-12-15 NOTE — UTILIZATION REVIEW
NOTIFICATION OF ADMISSION DISCHARGE   This is a Notification of Discharge from [facility]. Please be advised that this patient has been discharge from our facility. Below you will find the admission and discharge date and time including the patient’s disposition. UTILIZATION REVIEW CONTACT:  César Lorenzana  Utilization   Network Utilization Review Department  Phone: [phone] x carefully listen to the prompts. All voicemails are confidential.  Email: [email or web address]     ADMISSION INFORMATION  PRESENTATION DATE: 12/13/2023 10:14 PM  OBERVATION ADMISSION DATE:   INPATIENT ADMISSION DATE: 12/14/23  3:01 AM   DISCHARGE DATE: 12/15/2023  3:19 PM   DISPOSITION:Home/Self Care    Network Utilization Review Department  ATTENTION: Please call with any questions or concerns to [phone] and carefully listen to the prompts so that you are directed to the right person. All voicemails are confidential.   For Discharge needs, contact Care Management DC Support Team at [phone] opt. 2  Send all requests for admission clinical reviews, approved or denied determinations and any other requests to dedicated fax number below belonging to the campus where the patient is receiving treatment.  List of dedicated fax numbers for the Facilities:  [place] DENIALS (Administrative/Medical Necessity) [phone]   DISCHARGE SUPPORT TEAM (Network) [phone] [facility] (M[facility]/Pediatric[facility]) [phone]   [address] [facility] [address] [facility] [address] [place] [address] [id number] [place] [address] [phone]   [facility] [phone]   [address] [place] [address]  [address]  [phone]

## 2023-12-15 NOTE — DISCHARGE INSTR - AVS FIRST PAGE
Avoid strenuous activity while taking a fluoroquinolone antibiotic due to risk of tendonitis/tendonopathy

## 2023-12-15 NOTE — ASSESSMENT & PLAN NOTE
Lab Results   Component Value Date    HGBA1C 6.2 (H) 12/14/2023       Patient states that she used to be a diabetic but currently takes Ozempic more for weight loss. Will continue diabetic diet. Will place patient on insulin sliding scale and Accu-Cheks per protocol.       Blood Sugar Average: Last 72 hrs:  (P) 123.2

## 2023-12-15 NOTE — UTILIZATION REVIEW
Initial Clinical Review    Admission: Date/Time/Statement:   Admission Orders (From admission, onward)       Ordered        12/14/23 0300  Inpatient Admission  Once                          Orders Placed This Encounter   Procedures    Inpatient Admission     Standing Status:   Standing     Number of Occurrences:   1     Order Specific Question:   Level of Care     Answer:   Med Surg [16]     Order Specific Question:   Estimated length of stay     Answer:   More than 2 Midnights     Order Specific Question:   Certification     Answer:   I certify that inpatient services are medically necessary for this patient for a duration of greater than two midnights. See H&P and MD Progress Notes for additional information about the patient's course of treatment. ED Arrival Information       Expected   -    Arrival   12/13/2023 22:03    Acuity   Urgent              Means of arrival   Walk-In    Escorted by   Self    Service   Hospitalist    Admission type   Emergency              Arrival complaint   Back Pain             Chief Complaint   Patient presents with    Back Pain     Pt states she started with "shooting" back pain today bilaterally. States she's also been seen for an infection but unsure of the cause. States she had one episode of vomiting and diarrhea. Initial Presentation: 66 y.o. female with PMHx of diabetes, obesity, h/o sleeve gastrectomy, depression, bipolar d/o, HTN, HLD, GERD, asthma presents to ED with c/o b/l flank pain and intermittent fever. Pt reports 6-8 weeks of intermittent fevers as high as 101°F lasting for ~ the whole day to about 3-5 days. On presentation /33. WBC 10.32. K 2.6. CT A/P showing heterogenous appearance of b/l kidneys to correlate with possible pyelonephritis. UA neg. BUN/creatinine wnl. A1c 6.0. Questionable urologic abnormality such as urinary reflux causing renal findings in CT of abd/pelvis.  Admit Inpatient to M/S unit with Abnl Radiologic findings -- hold on abx currently. Check Procal. Follow blood cxs, repeat if developed fever >100. 3. replete potassium and monitor. Continue pta po meds. CPAP @ hs. Urology consulted. Urology consult -- CT a/p reviewed. The etiology of her radiographic findings is unclear. For further characterization CT urogram can be obtained however I do do not think that this is necessary in the acute setting. Pyelonephritis is a clinical diagnosis based typically on the presence of flank pain, UTI, and fever. Additional imaging is unlikely to  in the acute setting. She can be treated empirically for pyelonephritis. Although she has not had fevers during admission, she has reported intermittent fevers over the past couple months and does have flank pain. Ucx has been added on. With no other source empiric treatment is reasonable. Date: 12/15   Day 2: pt reports feeling improved today. VSS. Stable to d/c home with op f/u with urology of w/u with CT urogram is recommended in several weeks posttreatment for reevaluation.        ED Triage Vitals   Temperature Pulse Respirations Blood Pressure SpO2   12/13/23 2214 12/13/23 2208 12/13/23 2208 12/13/23 2208 12/13/23 2208   98.6 °F (37 °C) 77 18 (!) 136/33 98 %      Temp Source Heart Rate Source Patient Position - Orthostatic VS BP Location FiO2 (%)   12/13/23 2214 12/14/23 0130 -- -- --   Oral Monitor         Pain Score       12/13/23 2303       7          Wt Readings from Last 1 Encounters:   12/14/23 83.5 kg (184 lb)     Additional Vital Signs:   Date/Time Temp Pulse Resp BP MAP (mmHg) SpO2 O2 Device   12/15/23 11:26:25 99.1 °F (37.3 °C) 67 18 120/76 91 99 % --   12/15/23 08:19:42 -- 64 -- 107/55 72 96 % --   12/15/23 06:39:19 98.5 °F (36.9 °C) 68 -- -- -- 91 % --   12/15/23 04:44:03 98.7 °F (37.1 °C) 65 -- 104/67 79 95 % --   12/15/23 04:42:19 98.7 °F (37.1 °C) 65 18 104/68 80 96 % --   12/15/23 00:41:30 99.4 °F (37.4 °C) 71 -- -- -- 96 % --   12/14/23 21:45:38 99.8 °F (37.7 °C) 70 16 113/67 82 97 % --   12/14/23 2115 -- -- -- -- -- -- None (Room air)   12/14/23 15:31:22 97.4 °F (36.3 °C) Abnormal  71 16 111/72 85 97 % --   12/14/23 08:09:11 98.2 °F (36.8 °C) 74 18 143/79 100 98 % None (Room air)   12/14/23 0445 -- -- -- -- -- -- None (Room air)   12/14/23 04:04:41 98.3 °F (36.8 °C) 68 18 121/71 88 98 % --   12/14/23 0130 -- 62 18 126/69 92 99 % None (Room air)   12/13/23 2214 98.6 °F (37 °C) -- -- -- -- -- --   12/13/23 2208 -- 77 18 136/33 Abnormal  -- 98 % None (Room air)     Pertinent Labs/Diagnostic Test Results:   CT abdomen pelvis with contrast   Final Result by Hafsa Leary MD (12/14 0200)      1. Heterogeneous appearance of the bilateral kidneys noted of uncertain etiology as discussed above. Recommend correlation with urinalysis for diffuse pyelonephritis. No perinephric fluid collection, nephrolithiasis or hydronephrosis bilaterally. 2.  Small hiatal hernia and postsurgical changes from prior sleeve gastrectomy. No evidence for bowel obstruction, inflammation, appendicitis, free air, or free fluid. Postsurgical changes from prior cholecystectomy and hysterectomy.             Results from last 7 days   Lab Units 12/15/23  0643 12/14/23  0926 12/13/23  2327   WBC Thousand/uL 7.18 8.86 10.32*   HEMOGLOBIN g/dL 11.1* 13.0 11.6   HEMATOCRIT % 34.1* 38.9 34.6*   PLATELETS Thousands/uL 367 408* 401*   NEUTROS ABS Thousands/µL  --  5.15 6.64     Results from last 7 days   Lab Units 12/15/23  0643 12/14/23  0618 12/13/23  2327   SODIUM mmol/L 143 142 139   POTASSIUM mmol/L 2.9* 3.0* 2.6*   CHLORIDE mmol/L 99 103 97   CO2 mmol/L 36* 28 33*   ANION GAP mmol/L 8 11 9   BUN mg/dL 6 8 12   CREATININE mg/dL 0.84 0.76 0.86   EGFR ml/min/1.73sq m 77 87 75   CALCIUM mg/dL 8.8 8.6 8.9   MAGNESIUM mg/dL  --  2.0  --    PHOSPHORUS mg/dL  --  3.5  --      Results from last 7 days   Lab Units 12/14/23  0618 12/13/23  2327   AST U/L 43* 17   ALT U/L 31 22   ALK PHOS U/L 105* 90   TOTAL PROTEIN g/dL 6.2* 6.8   ALBUMIN g/dL 3.1* 3.4*   TOTAL BILIRUBIN mg/dL 0.36 0.42     Results from last 7 days   Lab Units 12/15/23  0819 12/14/23  2034 12/14/23  1705 12/14/23  1117 12/14/23  0806   POC GLUCOSE mg/dl 102 94 184* 120 116     Results from last 7 days   Lab Units 12/15/23  0643 12/14/23  0618 12/13/23  2327   GLUCOSE RANDOM mg/dL 108 107 111     Results from last 7 days   Lab Units 12/14/23  0617   HEMOGLOBIN A1C % 6.2*   EAG mg/dl 131     Results from last 7 days   Lab Units 12/13/23  2327   PROCALCITONIN ng/ml 0.20     Results from last 7 days   Lab Units 12/13/23  2327   LACTIC ACID mmol/L 1.4     Results from last 7 days   Lab Units 12/13/23  2327   LIPASE u/L 25     Results from last 7 days   Lab Units 12/15/23  0643   SED RATE mm/hour 76*     Results from last 7 days   Lab Units 12/13/23  2305   CLARITY UA  Clear   COLOR UA  Yellow   SPEC GRAV UA  1.015   PH UA  6.0   GLUCOSE UA mg/dl Negative   KETONES UA mg/dl Negative   BLOOD UA  Trace*   PROTEIN UA mg/dl Negative   NITRITE UA  Negative   BILIRUBIN UA  Negative   UROBILINOGEN UA E.U./dl 0.2   LEUKOCYTES UA  Small*   WBC UA /hpf 4-10*   RBC UA /hpf None Seen   BACTERIA UA /hpf None Seen   EPITHELIAL CELLS WET PREP /hpf Occasional     Results from last 7 days   Lab Units 12/13/23  2327   BLOOD CULTURE  No Growth at 24 hrs. No Growth at 24 hrs.        ED Treatment:   Medication Administration from 12/13/2023 2203 to 12/14/2023 0401         Date/Time Order Dose Route Action     12/13/2023 2328 EST sodium chloride 0.9 % bolus 1,000 mL 1,000 mL Intravenous New Bag     12/13/2023 2303 EST acetaminophen (TYLENOL) tablet 975 mg 975 mg Oral Given     12/14/2023 0040 EST potassium chloride (K-DUR,KLOR-CON) CR tablet 40 mEq 40 mEq Oral Given     12/14/2023 0134 EST potassium chloride 20 mEq IVPB (premix) 20 mEq Intravenous New Bag     12/14/2023 0245 EST morphine injection 4 mg 4 mg Intravenous Given         Past Medical History:   Diagnosis Date    Acute bronchitis     Anxiety     Arthritis     Asthma     Bariatric surgery status     Continuous positive airway pressure dependence     COVID-19 vaccine series completed 04/25/2021    CPAP (continuous positive airway pressure) dependence     Depression     Diabetes mellitus     GERD (gastroesophageal reflux disease)     Hyperlipidemia     Hypertension     Morbid obesity     Postsurgical malabsorption     Sleep apnea     USES C PAP     Present on Admission:   Bipolar affective disorder ([address])   Type 2 diabetes mellitus without complication, without long-term current use of insulin (HCC)   Obstructive sleep apnea treated with continuous positive airway pressure (CPAP)   GERD (gastroesophageal reflux disease)   Essential hypertension      Admitting Diagnosis: Hypokalemia [E87.6]  Back pain [M54.9]  Flank pain [R10.9]  Abnormal CT scan, kidney [R93.429]  Age/Sex: 66 y.o. female  Admission Orders:  Scheduled Medications:  amLODIPine, 10 mg, Oral, Daily  Diclofenac Sodium, 2 g, Topical, 4x Daily  enoxaparin, 40 mg, Subcutaneous, Daily  insulin lispro, 1-5 Units, Subcutaneous, HS  insulin lispro, 1-6 Units, Subcutaneous, TID AC  lamoTRIgine, 200 mg, Oral, Daily  levofloxacin, 750 mg, Oral, Q24H  losartan, 100 mg, Oral, Daily  multivitamin stress formula, 1 tablet, Oral, Daily  pantoprazole, 20 mg, Oral, Early Morning  potassium chloride, 40 mEq, Oral, Once  venlafaxine, 225 mg, Oral, Daily    PRN Meds:  acetaminophen, 650 mg, Oral, Q6H PRN  albuterol, 2 puff, Inhalation, Q4H PRN  ALPRAZolam, 0.25 mg, Oral, HS PRN  aluminum-magnesium hydroxide-simethicone, 30 mL, Oral, Q6H PRN  docusate sodium, 100 mg, Oral, BID PRN  morphine injection, 2 mg, Intravenous, Q4H PRN 12/14 x2  ondansetron, 4 mg, Intravenous, Q6H PRN 12/14 x2          Network Utilization Review Department  ATTENTION: Please call with any questions or concerns to [phone] and carefully listen to the prompts so that you are directed to the right person.  All voicemails are confidential.   For Discharge needs, contact Care Management DC Support Team at [phone] opt. 2  Send all requests for admission clinical reviews, approved or denied determinations and any other requests to dedicated fax number below belonging to the campus where the patient is receiving treatment.  List of dedicated fax numbers for the Facilities:  [place] DENIALS (Administrative/Medical Necessity) [phone]   DISCHARGE SUPPORT TEAM (NETWORK) [id number] [facility] Dawson (Maternity/NICU/Pediatrics) [phone]   [address] [place] [address] Drive [address] [place] [address] 1000 [facility] [phone] [address] [place] [address] 525 [address] [address] [facility] [address] [facility] [address] [phone]

## 2023-12-15 NOTE — DISCHARGE SUMMARY
[address] [facility]  Discharge- Robby Burrows 1965, 66 y.o. female MRN: [id number]  [facility] Encounter: [id number]  Primary Care Provider: Eugenia Correa   Date and time admitted to hospital: 12/13/2023 10:14 PM    Hypokalemia  Assessment & Plan  Patient found to be hypokalemic most likely secondary to diarrhea for about a day yesterday. (Already resolved)  Also has poor p.o. intake (only had a cracker this evening). Repleted with oral and IV potassium. Will recheck metabolic profile. Status post laparoscopic sleeve gastrectomy  Assessment & Plan  Status post laparoscopic sleeve gastrectomy and follows with bariatric surgery. GERD (gastroesophageal reflux disease)  Assessment & Plan  On pantoprazole. Maalox as needed. Bipolar affective disorder [facility])  Assessment & Plan  Continue Xanax as needed. Also on Effexor 225 mg daily. Obstructive sleep apnea treated with continuous positive airway pressure (CPAP)  Assessment & Plan  Continue CPAP at night. Essential hypertension  Assessment & Plan  Continue Norvasc 10 mg daily;  Continue Cozaar 100 mg daily. Type 2 diabetes mellitus without complication, without long-term current use of insulin [facility])  Assessment & Plan  Lab Results   Component Value Date    HGBA1C 6.2 (H) 12/14/2023       Patient states that she used to be a diabetic but currently takes Ozempic more for weight loss. Will continue diabetic diet. Will place patient on insulin sliding scale and Accu-Cheks per protocol. Blood Sugar Average: Last 72 hrs:  (P) 123.2      * Abnormal radiologic findings on diagnostic imaging of renal pelvis, ureter, or bladder  Assessment & Plan  Patient reports 6 to 8 weeks of intermittent fevers as high as 101°F lasting for approximately the whole day to about 3 to 5 days. Denies any dysuria or hesitancy or any previous urologic abnormality. No episode of fevers for more than 48 hours.   However claims to have new onset bilateral flank pain. Currently not tender when palpated. CT scan of the abdomen showing heterogenous appearance of bilateral kidneys to correlate with possible pyelonephritis. Of note patient does not have fever and urinalysis is unremarkable. BUN/creatinine are both within normal and same as previous. Questionable urologic abnormality such as urinary reflux causing renal findings in CT of the abdomen and pelvis. Urology consult for opinion. At this point, will not start antibiotics. Patient is afebrile and white count normal.  Will get procalcitonin. In the event patient has fever of greater than 100.3 °F; repeat blood cultures and consider starting patient on antibiotics (Rocephin?). Prior to admission, blood cultures have been ordered by ER x 2. Medical Problems       Resolved Problems  Date Reviewed: 12/15/2023   None       Discharging Physician / Practitioner: Jakob Buckner  PCP: Soledad Gonsalves  Admission Date:   Admission Orders (From admission, onward)       Ordered        12/14/23 0300  Inpatient Admission  Once                          Discharge Date: 12/15/23    Consultations During Hospital Stay:  Urology    Procedures Performed:   None    Significant Findings / Test Results:   Sed Rate 76  AM cortisol 12.9  Na 143  K 2.9  RF Negative  AVA positive    Incidental Findings:   N/A     Test Results Pending at Discharge (will require follow up): AVA titer  Urine culture (in process at time of dc)  Blood cultures (no growth to date at time of dc)     Outpatient Tests Requested: Follow-up rheumatologic studies and consider referral to rheumatology  Outpatient follow-up with urology requested  Low clinical suspicion for adrenal insufficiency at this time, but if suspicion remains could consider cosyntropin stimulation test  CT abdomen/pelvis 12/14/23- "Heterogeneous appearance of the bilateral kidneys noted of uncertain etiology as discussed above.  Recommend correlation with urinalysis for diffuse pyelonephritis. No perinephric fluid collection, nephrolithiasis or hydronephrosis bilaterally. Small hiatal hernia and postsurgical changes from prior sleeve gastrectomy. No evidence for bowel obstruction, inflammation, appendicitis, free air, or free fluid. Postsurgical changes from prior cholecystectomy and hysterectomy."    Complications:  None    Reason for Admission: Fever, flank pain    Hospital Course:   Nara Ray is a 66 y.o. female patient who originally presented to the hospital on 12/13/2023 due to Fever and flank pain. She endorses several weeks of fever but had not had any formal workup. She came to the ED for flank pain and CT imaging demonstrated heterogeneous-appearing kidneys with evidence of possible pyelonephritis. Patient was admitted and urology was consulted. Urology recommended treating empirically for pyelonephritis and following up outpatient. While in the hospital, patient did not have a clinical fever. Pain improved somewhat with supportive care and patient was deemed appropriate for outpatient followup. Inflammatory markers and rheumatologic studies ordered to assess for possible autoimmune etiology. Patient also had AM cortisol test but clinically, suspicion of adrenal insufficiency is lower on the differential. She is to followup with urology in the outpatient setting. Please see above list of diagnoses and related plan for additional information. Condition at Discharge: good    Discharge Day Visit / Exam:   Subjective:  Laying in bed, no acute distress  Vitals: Blood Pressure: 107/55 (12/15/23 0819)  Pulse: 64 (12/15/23 0819)  Temperature: 98.5 °F (36.9 °C) (12/15/23 0639)  Temp Source: Oral (12/14/23 0404)  Respirations: 18 (12/15/23 0442)  Height: 5' 4" (162.6 cm) (12/14/23 0404)  Weight - Scale: 83.5 kg (184 lb) (12/14/23 0404)  SpO2: 96 % (12/15/23 0819)  Exam:   Physical Exam  Vitals reviewed.    Constitutional:       General: She is not in acute distress. Appearance: She is not ill-appearing. HENT:      Head: Normocephalic. Mouth/Throat:      Mouth: Mucous membranes are moist.   Eyes:      General: No scleral icterus. Extraocular Movements: Extraocular movements intact. Cardiovascular:      Rate and Rhythm: Normal rate and regular rhythm. Pulses: Normal pulses. Heart sounds: No murmur heard. No friction rub. No gallop. Pulmonary:      Effort: Pulmonary effort is normal. No respiratory distress. Breath sounds: No wheezing, rhonchi or rales. Abdominal:      General: Abdomen is flat. Bowel sounds are normal. There is no distension. Palpations: Abdomen is soft. Tenderness: There is no abdominal tenderness. There is no guarding or rebound. Musculoskeletal:      Right lower leg: No edema. Left lower leg: No edema. Skin:     General: Skin is warm. Capillary Refill: Capillary refill takes less than 2 seconds. Coloration: Skin is not jaundiced. Findings: No rash. Neurological:      General: No focal deficit present. Mental Status: She is alert and oriented to person, place, and time. Sensory: No sensory deficit. Motor: No weakness. Psychiatric:         Mood and Affect: Mood normal.         Behavior: Behavior normal.          Discussion with Family: Patient declined call to . Discharge instructions/Information to patient and family:   See after visit summary for information provided to patient and family. Provisions for Follow-Up Care:  See after visit summary for information related to follow-up care and any pertinent home health orders. Mobility at time of Discharge:   Basic Mobility Inpatient Raw Score: 23  JH-HLM Goal: 7: Walk 25 feet or more  JH-HLM Achieved: 7: Walk 25 feet or more  HLM Goal achieved. Continue to encourage appropriate mobility.      Disposition:   Home    Planned Readmission: No     Discharge Statement:  I spent 45 minutes discharging the patient. This time was spent on the day of discharge. I had direct contact with the patient on the day of discharge. Greater than 50% of the total time was spent examining patient, answering all patient questions, arranging and discussing plan of care with patient as well as directly providing post-discharge instructions. Additional time then spent on discharge activities. Discharge Medications:  See after visit summary for reconciled discharge medications provided to patient and/or family.       **Please Note: This note may have been constructed using a voice recognition system**

## 2023-12-15 NOTE — RESTORATIVE TECHNICIAN NOTE
Restorative Technician Note      Patient Name: Tianna Holcomb     Restorative Tech Visit Date: 12/15/23  Note Type: Mobility  Patient Position Upon Consult: Supine  Activity Performed: Ambulated  Assistive Device: Other (Comment) (IV Pole)  Patient Position at End of Consult: Supine;  All needs within reach    Radha Couch, Restorative Technician

## 2023-12-15 NOTE — PLAN OF CARE
Problem: PAIN - ADULT  Goal: Verbalizes/displays adequate comfort level or baseline comfort level  Description: Interventions:  - Encourage patient to monitor pain and request assistance  - Assess pain using appropriate pain scale  - Administer analgesics based on type and severity of pain and evaluate response  - Implement non-pharmacological measures as appropriate and evaluate response  - Consider cultural and social influences on pain and pain management  - Notify physician/advanced practitioner if interventions unsuccessful or patient reports new pain  Outcome: Progressing     Problem: INFECTION - ADULT  Goal: Absence or prevention of progression during hospitalization  Description: INTERVENTIONS:  - Assess and monitor for signs and symptoms of infection  - Monitor lab/diagnostic results  - Monitor all insertion sites, i.e. indwelling lines, tubes, and drains  - Monitor endotracheal if appropriate and nasal secretions for changes in amount and color  - [place] appropriate cooling/warming therapies per order  - Administer medications as ordered  - Instruct and encourage patient and family to use good hand hygiene technique  - Identify and instruct in appropriate isolation precautions for identified infection/condition  Outcome: Progressing     Problem: SAFETY ADULT  Goal: Patient will remain free of falls  Description: INTERVENTIONS:  - Educate patient/family on patient safety including physical limitations  - Instruct patient to call for assistance with activity   - Consult OT/PT to assist with strengthening/mobility   - Keep Call bell within reach  - Keep bed low and locked with side rails adjusted as appropriate  - Keep care items and personal belongings within reach  - Initiate and maintain comfort rounds  - Make Fall Risk Sign visible to staff  - Offer Toileting every 2 Hours, in advance of need  - Initiate/Maintain bed alarm  - Obtain necessary fall risk management equipment:   - Apply yellow socks and bracelet for high fall risk patients  - Consider moving patient to room near nurses station  Outcome: Progressing  Goal: Maintain or return to baseline ADL function  Description: INTERVENTIONS:  -  Assess patient's ability to carry out ADLs; assess patient's baseline for ADL function and identify physical deficits which impact ability to perform ADLs (bathing, care of mouth/teeth, toileting, grooming, dressing, etc.)  - Assess/evaluate cause of self-care deficits   - Assess range of motion  - Assess patient's mobility; develop plan if impaired  - Assess patient's need for assistive devices and provide as appropriate  - Encourage maximum independence but intervene and supervise when necessary  - Involve family in performance of ADLs  - Assess for home care needs following discharge   - Consider OT consult to assist with ADL evaluation and planning for discharge  - Provide patient education as appropriate  Outcome: Progressing  Goal: Maintains/Returns to pre admission functional level  Description: INTERVENTIONS:  - Perform AM-PAC 6 Click Basic Mobility/ Daily Activity assessment daily.  - Set and communicate daily mobility goal to care team and patient/family/caregiver. - Collaborate with rehabilitation services on mobility goals if consulted  - Perform Range of Motion 2 times a day. - Reposition patient every 2 hours.   - Dangle patient 2 times a day  - Stand patient 2 times a day  - Ambulate patient 2 times a day  - Out of bed to chair 2 times a day   - Out of bed for meals 2 times a day  - Out of bed for toileting  - Record patient progress and toleration of activity level   Outcome: Progressing     Problem: DISCHARGE PLANNING  Goal: Discharge to home or other facility with appropriate resources  Description: INTERVENTIONS:  - Identify barriers to discharge w/patient and caregiver  - Arrange for needed discharge resources and transportation as appropriate  - Identify discharge learning needs (meds, wound care, etc.)  - Arrange for interpretive services to assist at discharge as needed  - Refer to Case Management Department for coordinating discharge planning if the patient needs post-hospital services based on physician/advanced practitioner order or complex needs related to functional status, cognitive ability, or social support system  Outcome: Progressing     Problem: Knowledge Deficit  Goal: Patient/family/caregiver demonstrates understanding of disease process, treatment plan, medications, and discharge instructions  Description: Complete learning assessment and assess knowledge base.   Interventions:  - Provide teaching at level of understanding  - Provide teaching via preferred learning methods  Outcome: Progressing

## 2023-12-16 LAB — BACTERIA UR CULT: NORMAL

## 2023-12-17 LAB
BACTERIA BLD CULT: NORMAL
BACTERIA BLD CULT: NORMAL

## 2023-12-18 LAB
ANA HOMOGEN SER QL IF: NORMAL
ANA HOMOGEN TITR SER: NORMAL {TITER}

## 2023-12-19 LAB
BACTERIA BLD CULT: NORMAL
BACTERIA BLD CULT: NORMAL

## 2024-02-06 DIAGNOSIS — E11.9 DIABETES MELLITUS, NEW ONSET (HCC): ICD-10-CM

## 2024-02-06 RX ORDER — SEMAGLUTIDE 2.68 MG/ML
INJECTION, SOLUTION SUBCUTANEOUS
Qty: 3 ML | Refills: 2 | Status: SHIPPED | OUTPATIENT
Start: 2024-02-06

## 2024-04-10 ENCOUNTER — OFFICE VISIT (OUTPATIENT)
Dept: BARIATRICS | Facility: CLINIC | Age: 59
End: 2024-04-10

## 2024-04-10 VITALS
DIASTOLIC BLOOD PRESSURE: 82 MMHG | TEMPERATURE: 97.8 F | HEART RATE: 63 BPM | HEIGHT: 64 IN | SYSTOLIC BLOOD PRESSURE: 112 MMHG | WEIGHT: 169.8 LBS | RESPIRATION RATE: 16 BRPM | BODY MASS INDEX: 28.99 KG/M2

## 2024-04-10 DIAGNOSIS — E66.3 OVERWEIGHT: Primary | ICD-10-CM

## 2024-04-10 DIAGNOSIS — E11.9 DIABETES MELLITUS, NEW ONSET (HCC): ICD-10-CM

## 2024-04-10 DIAGNOSIS — I10 ESSENTIAL HYPERTENSION: ICD-10-CM

## 2024-04-10 DIAGNOSIS — E11.9 TYPE 2 DIABETES MELLITUS WITHOUT COMPLICATION, WITHOUT LONG-TERM CURRENT USE OF INSULIN (HCC): ICD-10-CM

## 2024-04-10 PROCEDURE — 99214 OFFICE O/P EST MOD 30 MIN: CPT | Performed by: PHYSICIAN ASSISTANT

## 2024-04-10 RX ORDER — MULTIVITAMIN,THERAPEUTIC
1 TABLET ORAL DAILY
COMMUNITY

## 2024-04-10 RX ORDER — POTASSIUM CHLORIDE 750 MG/1
TABLET, FILM COATED, EXTENDED RELEASE ORAL
COMMUNITY
Start: 2024-02-10

## 2024-04-10 NOTE — ASSESSMENT & PLAN NOTE
Lab Results   Component Value Date    HGBA1C 6.2 (H) 12/14/2023   Hx of diabetes but blood sugar has been controlled with ozempic and weight loss

## 2024-04-10 NOTE — PROGRESS NOTES
Assessment/Plan:    Overweight  - S/P lap sleeve gastrectomy on 10/21/19 by Dr. Mac.  -Patient is pursuing Conservative Program  -Initial weight loss goal of 5-10% weight loss for improved health  -Screening labs completed and reviewed    Initial:228.2  Last visit: 183  Current: 169.8  Change:-58.4from initial(-13.2 lb from last OV)    Goals:  -continue protein and fiber intake  -continue elliptical for exercise  - keep up the great work with water intake.       To continue on ozempic 2mg. . Tolerating well. Start dose 234.6 lb on 11/8/22.  Patient denies personal and family history of MCT and MEN2 tumors. Patient denies personal history of pancreatitis.   - S/P hysterectomy    Essential hypertension  Currently on and chlorthalidone.       Type 2 diabetes mellitus without complication, without long-term current use of insulin (HCC)    Lab Results   Component Value Date    HGBA1C 6.2 (H) 12/14/2023   Hx of diabetes but blood sugar has been controlled with ozempic and weight loss        Follow up in approximately  5 months  with Non-Surgical Physician/Advanced Practitioner.     Diagnoses and all orders for this visit:    Overweight    Diabetes mellitus, new onset (HCC)  -     semaglutide, 2 mg/dose, (Ozempic, 2 MG/DOSE,) 8 mg/ mL injection pen; Inject 0.75 mL (2 mg total) under the skin every 7 days    Essential hypertension    Type 2 diabetes mellitus without complication, without long-term current use of insulin (HCC)    Other orders  -     Multiple Vitamins-Minerals (Oncovite) TABS; Take 1 tablet by mouth daily  -     potassium chloride (Klor-Con) 10 mEq tablet          Subjective:   Chief Complaint   Patient presents with    Follow-up     MWM- 4mth postop, wt gain, f/u        Patient ID: Rosita Yuen  is a 58 y.o. female with excess weight/obesity here to pursue weight managment.  Patient is pursuing Conservative Program.     HPI  S/P lap sleeve gastrectomy on 10/21/19 by Dr. Mac   On ozempic 2mg.  She did  have constipation prior but then had loose stool and now BM are normal.  No longer taking any supplement  She was having intermittent fevers, fatigue and aches and was found to have a kidney infection after initial workup was negative. Was found to have innumerable cysts on pancrease.  She did see [facility] pancrease specialist and they did not see concern with lesions since less than 1 cm or with ozempic,   Wt Readings from Last 10 Encounters:   04/10/24 77 kg (169 lb 12.8 oz)   12/14/23 83.5 kg (184 lb)   12/09/23 83.5 kg (184 lb)   12/07/23 83 kg (183 lb)   10/04/23 89.4 kg (197 lb)   08/09/23 89.4 kg (197 lb)   06/16/23 94.6 kg (208 lb 8 oz)   05/04/23 98.1 kg (216 lb 3.2 oz)   03/27/23 99.8 kg (220 lb)   02/22/23 99.8 kg (220 lb)       Food logging:  Increased appetite/cravings:  Fruit/Vegetable servings:  Exercise:walking the dog 2.5 miles yesterday.  Hydration:water at least 80 oz a day, 3 cups of coffee w/small amount of less than 1 teaspoon  Half and half, protein water, 1 protein shake if needed if not having enough    Diet recall:  B:Greek yogurt  L:can be shake or salad w/chicken  S:Protein bar  D: protein, vegetable, small amount of starch    The following portions of the patient's history were reviewed and updated as appropriate: She  has a past medical history of Acute bronchitis, Anxiety, Arthritis, Asthma, Bariatric surgery status, Continuous positive airway pressure dependence, COVID-19 vaccine series completed (04/25/2021), CPAP (continuous positive airway pressure) dependence, Depression, Diabetes mellitus, GERD (gastroesophageal reflux disease), Hyperlipidemia, Hypertension, Morbid obesity, Postsurgical malabsorption, and Sleep apnea.  She   Patient Active Problem List    Diagnosis Date Noted    Hypokalemia 12/14/2023    Abnormal radiologic findings on diagnostic imaging of renal pelvis, ureter, or bladder 12/14/2023    Status post laparoscopic sleeve gastrectomy 11/03/2022    Overweight  2020    Encounter for surgical aftercare following surgery of digestive system 2020    GERD (gastroesophageal reflux disease) 2020    Bariatric surgery status 2020    Postsurgical malabsorption 2020    Type 2 diabetes mellitus with complication (HCC) 2019    Sleep apnea 2019    Preprocedural cardiovascular examination 2019    Knee pain 2018    Obstructive sleep apnea treated with continuous positive airway pressure (CPAP)     Type 2 diabetes mellitus without complication, without long-term current use of insulin (HCC) 2018    Essential hypertension 2018    Depression 2018    Diabetes mellitus, new onset (HCC) 2016    Dysmetabolic syndrome X 01/10/2011    Mixed hyperlipidemia 01/10/2011    Bipolar affective disorder (HCC) 2010    Extrinsic asthma 2010     She  has a past surgical history that includes Cholecystectomy; Hysterectomy; Knee arthroscopy (Bilateral);  section; EGD; [place] tooth extraction; Breast surgery; and pr laps gstrc rstrictiv px longitudinal gastrectomy (N/A, 10/21/2019).  Her family history includes Brain cancer in her mother; Breast cancer in her mother; Diabetes in her father; Heart disease in her father; Hypertension in her father and mother; Melanoma in her father; Prostate cancer in her father; Skin cancer in her father.  She  reports that she has never smoked. She has never used smokeless tobacco. She reports that she does not currently use alcohol. She reports that she does not use drugs.  Current Outpatient Medications   Medication Sig Dispense Refill    albuterol (PROVENTIL HFA,VENTOLIN HFA) 90 mcg/act inhaler Inhale 2 puffs as needed      ALPRAZolam (XANAX) 0.25 mg tablet Take 0.25 mg by mouth as needed      amLODIPine (NORVASC) 10 mg tablet Take 10 mg by mouth daily      chlorthalidone 25 mg tablet       diclofenac sodium (VOLTAREN) 1 % Apply 2 g topically 3 (three) times a day      glucose  blood test strip       glucose blood test strip       lamoTRIgine (LaMICtal) 200 MG tablet Take 200 mg by mouth daily       losartan (COZAAR) 100 MG tablet       Multiple Vitamins-Minerals (Oncovite) TABS Take 1 tablet by mouth daily      multivitamin (THERAGRAN) TABS Take 1 tablet by mouth daily      omeprazole (PriLOSEC) 20 mg delayed release capsule Take 1 capsule (20 mg total) by mouth 2 (two) times a day 180 capsule 2    potassium chloride (Klor-Con) 10 mEq tablet       semaglutide, 2 mg/dose, (Ozempic, 2 MG/DOSE,) 8 mg/ mL injection pen Inject 0.75 mL (2 mg total) under the skin every 7 days 3 mL 4    venlafaxine (EFFEXOR-XR) 150 mg 24 hr capsule       venlafaxine (EFFEXOR-XR) 75 mg 24 hr capsule Take 75 mg by mouth daily      potassium chloride (K-DUR,KLOR-CON) 20 mEq tablet Take 1 tablet (20 mEq total) by mouth daily 30 tablet 0     No current facility-administered medications for this visit.     Current Outpatient Medications on File Prior to Visit   Medication Sig    albuterol (PROVENTIL HFA,VENTOLIN HFA) 90 mcg/act inhaler Inhale 2 puffs as needed    ALPRAZolam (XANAX) 0.25 mg tablet Take 0.25 mg by mouth as needed    amLODIPine (NORVASC) 10 mg tablet Take 10 mg by mouth daily    chlorthalidone 25 mg tablet     diclofenac sodium (VOLTAREN) 1 % Apply 2 g topically 3 (three) times a day    glucose blood test strip     glucose blood test strip     lamoTRIgine (LaMICtal) 200 MG tablet Take 200 mg by mouth daily     losartan (COZAAR) 100 MG tablet     Multiple Vitamins-Minerals (Oncovite) TABS Take 1 tablet by mouth daily    multivitamin (THERAGRAN) TABS Take 1 tablet by mouth daily    omeprazole (PriLOSEC) 20 mg delayed release capsule Take 1 capsule (20 mg total) by mouth 2 (two) times a day    potassium chloride (Klor-Con) 10 mEq tablet     venlafaxine (EFFEXOR-XR) 150 mg 24 hr capsule     venlafaxine (EFFEXOR-XR) 75 mg 24 hr capsule Take 75 mg by mouth daily    [DISCONTINUED] Ozempic, 2 MG/DOSE, 8 MG/3ML  "injection pen INJECT 0.75 ML (2 MG TOTAL) UNDER THE SKIN EVERY SEVEN (7) DAYS    potassium chloride (K-DUR,KLOR-CON) 20 mEq tablet Take 1 tablet (20 mEq total) by mouth daily     No current facility-administered medications on file prior to visit.     She is allergic to amoxicillin, amoxicillin-pot clavulanate, oxycodone-acetaminophen, sulfa antibiotics, erythromycin, and wound dressings..    Review of Systems   Constitutional:  Negative for fever.   Respiratory:  Negative for shortness of breath.    Cardiovascular:  Negative for chest pain and palpitations.   Gastrointestinal:  Negative for abdominal pain, constipation, diarrhea and vomiting.   Genitourinary:  Negative for difficulty urinating.   Musculoskeletal:  Negative for arthralgias and back pain.   Skin:  Negative for rash.   Neurological:  Negative for headaches.   Psychiatric/Behavioral:  Negative for dysphoric mood. The patient is not nervous/anxious.        Objective:    /82   Pulse 63   Temp 97.8 °F (36.6 °C)   Resp 16   Ht 5' 4\" (1.626 m)   Wt 77 kg (169 lb 12.8 oz)   BMI 29.15 kg/m²      Physical Exam  Vitals and nursing note reviewed.   Constitutional:       General: She is not in acute distress.     Appearance: She is well-developed.      Comments: Overweight     HENT:      Head: Normocephalic and atraumatic.   Eyes:      Conjunctiva/sclera: Conjunctivae normal.   Neck:      Thyroid: No thyromegaly.   Pulmonary:      Effort: Pulmonary effort is normal. No respiratory distress.   Skin:     Findings: No rash (visible).   Neurological:      Mental Status: She is alert and oriented to person, place, and time.   Psychiatric:         Behavior: Behavior normal.         "

## 2024-04-10 NOTE — ASSESSMENT & PLAN NOTE
- S/P lap sleeve gastrectomy on 10/21/19 by Dr. Mac.  -Patient is pursuing Conservative Program  -Initial weight loss goal of 5-10% weight loss for improved health  -Screening labs completed and reviewed    Initial:228.2  Last visit: 183  Current: 169.8  Change:-58.4from initial(-13.2 lb from last OV)    Goals:  -continue protein and fiber intake  -continue elliptical for exercise  - keep up the great work with water intake.       To continue on ozempic 2mg. . Tolerating well. Start dose 234.6 lb on 11/8/22.  Patient denies personal and family history of MCT and MEN2 tumors. Patient denies personal history of pancreatitis.   - S/P hysterectomy

## 2024-06-06 ENCOUNTER — TELEPHONE (OUTPATIENT)
Dept: BARIATRICS | Facility: CLINIC | Age: 59
End: 2024-06-06

## 2024-07-29 NOTE — PROGRESS NOTES
Weight Management Medical Nutrition Assessment  Rosita presented for 1/3 RD meal planning session  Today's weight is 264 9#  She has had a 1 9# weight loss in the past month and overall she has lost # (% TBW)  in the past  months  Removed trigger foods from the home  Using lose-it adal- with average of 1800 calories  Still struggle with portion sizes of dinner meal  Started higher dose of Topriamate and is helping with feeling payne on less food  Long periods between breakfast and dinner  Discussed adding planned snacks  Anthropometric Measurements  Start Weight (lbs):272 2#  Current Weight (lbs): 264 9 #  TBW % Change from start weight:2 7%  Ideal Body Weight (lbs):120#  Goal Weight (lbs):ST#      LT#      Weight Loss History  Previous weight loss attempts: Commercial Programs (Weight Watchers, Tali Obey, etc )  Counseling with  MD  Phentermine     Food and Nutrition Related History    Beverages: water  Volume of beverage intake: 60-80oz     Weekends:   Cravings: sweets  Trouble area of day:nighttime     Frequency of Eating out: irregularly  Food restrictions:none  Cooking: self   Food Shopping: self     Physical Activity Intake  Activity:none at the moment  Frequency:infrequently  Physical limitations/barriers to exercise: knee pain- needs replacement      Estimated Needs  Energy     [employer] Needs:  BMR : 1802 calories    1-2# loss weekly sedentary:1833-1097   calories              8-4# loss weekly lightly active:1477-1971calories   Protein:67-84gm    (1 2-1 5g/kg IBW)  Fluid: 65oz     (35mL/kg IBW)     Nutrition Diagnosis  Yes;    Overweight/obesity  related to Excess energy intake as evidenced by  BMI more than normative standard for age and sex (obesity-grade III 36+)     Nutrition Intervention     Nutrition Prescription  Calories:1200 calories on sedentary days and flex to 0118-4793 on eliptical days  Protein:70-90grams daily   Fluid:80oz daily      Meal Plan  Breakfast:200-300/20/30  Snack:  Lunch:400-500/30/30  Snack:  Dinner:400-500/30/30  Snack:  (200)     Nutrition Education:       Calorie controlled menu  Lean protein food choices  Healthy snack options  Food journaling tips        Nutrition Counseling:  Strategies: meal planning, portion sizes, healthy snack choices, hydration, fiber intake, protein intake, exercise, food journal        Monitoring and Evaluation:  Evaluation criteria:  Energy Intake  Meet protein needs  Maintain adequate hydration  Monitor weekly weight  Meal planning/preparation  Food journal   Decreased portions at mealtimes and snacks  Physical activity      Barriers to learning:none  Readiness to change: Action  Comprehension: good  Expected Compliance: good 0

## 2024-08-27 DIAGNOSIS — E11.9 DIABETES MELLITUS, NEW ONSET (HCC): ICD-10-CM

## 2024-08-27 RX ORDER — SEMAGLUTIDE 2.68 MG/ML
INJECTION, SOLUTION SUBCUTANEOUS
Qty: 3 ML | Refills: 2 | Status: SHIPPED | OUTPATIENT
Start: 2024-08-27

## 2024-10-05 DIAGNOSIS — E11.9 DIABETES MELLITUS, NEW ONSET (HCC): ICD-10-CM

## 2024-10-07 RX ORDER — SEMAGLUTIDE 2.68 MG/ML
INJECTION, SOLUTION SUBCUTANEOUS
Qty: 3 ML | Refills: 2 | Status: SHIPPED | OUTPATIENT
Start: 2024-10-07

## 2024-10-08 ENCOUNTER — OFFICE VISIT (OUTPATIENT)
Dept: BARIATRICS | Facility: CLINIC | Age: 59
End: 2024-10-08

## 2024-10-08 VITALS
WEIGHT: 158.2 LBS | DIASTOLIC BLOOD PRESSURE: 76 MMHG | BODY MASS INDEX: 27.01 KG/M2 | SYSTOLIC BLOOD PRESSURE: 112 MMHG | HEART RATE: 63 BPM | RESPIRATION RATE: 16 BRPM | TEMPERATURE: 97.5 F | HEIGHT: 64 IN

## 2024-10-08 DIAGNOSIS — E11.9 TYPE 2 DIABETES MELLITUS WITHOUT COMPLICATION, WITHOUT LONG-TERM CURRENT USE OF INSULIN (HCC): ICD-10-CM

## 2024-10-08 DIAGNOSIS — K91.2 POSTSURGICAL MALABSORPTION: Chronic | ICD-10-CM

## 2024-10-08 DIAGNOSIS — R12 HEART BURN: ICD-10-CM

## 2024-10-08 DIAGNOSIS — E66.3 OVERWEIGHT: Primary | ICD-10-CM

## 2024-10-08 DIAGNOSIS — Z98.890 STATUS POST DIGESTIVE SYSTEM SURGERY: ICD-10-CM

## 2024-10-08 DIAGNOSIS — E87.6 HYPOKALEMIA: ICD-10-CM

## 2024-10-08 PROCEDURE — 99214 OFFICE O/P EST MOD 30 MIN: CPT | Performed by: PHYSICIAN ASSISTANT

## 2024-10-08 NOTE — ASSESSMENT & PLAN NOTE
- S/P lap sleeve gastrectomy on 10/21/19 by Dr. Mac.  -Patient is pursuing Conservative Program  -Initial weight loss goal of 5-10% weight loss for improved health  -Screening labs completed and reviewed    Initial:228.2  Last visit: 169.8  Current: 158.2  Change:-70 from initial(-11.6 lb from last OV)    Goals:  -continue protein and fiber intake  -continue elliptical for exercise  - keep up the great work with water intake.       To continue on ozempic 2mg. . Tolerating well. Start weight on 234.6 lb on 11/8/22.  32.6% weight loss. Patient denies personal and family history of MCT and MEN2 tumors. Patient denies personal history of pancreatitis.   - S/P hysterectomy

## 2024-10-08 NOTE — ASSESSMENT & PLAN NOTE
Her insurance has changed and unable to find up with surgical team here. Plans on discussing having vitamin labs ordered by PCP

## 2024-10-08 NOTE — ASSESSMENT & PLAN NOTE
Lab Results   Component Value Date    HGBA1C 5.6 08/05/2024   Controlled and currently at low point. To continue on ozempic 2mg

## 2024-12-19 DIAGNOSIS — E11.9 DIABETES MELLITUS, NEW ONSET (HCC): ICD-10-CM

## 2024-12-19 RX ORDER — SEMAGLUTIDE 2.68 MG/ML
INJECTION, SOLUTION SUBCUTANEOUS
Qty: 3 ML | Refills: 2 | Status: SHIPPED | OUTPATIENT
Start: 2024-12-19

## 2025-04-04 DIAGNOSIS — E11.9 DIABETES MELLITUS, NEW ONSET (HCC): ICD-10-CM

## 2025-04-11 DIAGNOSIS — E11.9 DIABETES MELLITUS, NEW ONSET (HCC): ICD-10-CM

## 2025-04-11 RX ORDER — SEMAGLUTIDE 2.68 MG/ML
INJECTION, SOLUTION SUBCUTANEOUS
Qty: 3 ML | Refills: 1 | OUTPATIENT
Start: 2025-04-11

## 2025-05-08 ENCOUNTER — OFFICE VISIT (OUTPATIENT)
Dept: BARIATRICS | Facility: CLINIC | Age: 60
End: 2025-05-08

## 2025-05-08 VITALS
DIASTOLIC BLOOD PRESSURE: 72 MMHG | HEART RATE: 75 BPM | SYSTOLIC BLOOD PRESSURE: 122 MMHG | WEIGHT: 160.2 LBS | TEMPERATURE: 97.6 F | BODY MASS INDEX: 27.35 KG/M2 | RESPIRATION RATE: 16 BRPM | HEIGHT: 64 IN

## 2025-05-08 DIAGNOSIS — E11.9 DIABETES MELLITUS, NEW ONSET (HCC): ICD-10-CM

## 2025-05-08 DIAGNOSIS — K91.2 POSTSURGICAL MALABSORPTION: Chronic | ICD-10-CM

## 2025-05-08 DIAGNOSIS — K86.2 PANCREATIC CYST: ICD-10-CM

## 2025-05-08 DIAGNOSIS — K21.9 GERD (GASTROESOPHAGEAL REFLUX DISEASE): ICD-10-CM

## 2025-05-08 DIAGNOSIS — E66.3 OVERWEIGHT: Primary | ICD-10-CM

## 2025-05-08 DIAGNOSIS — E11.9 TYPE 2 DIABETES MELLITUS WITHOUT COMPLICATION (HCC): ICD-10-CM

## 2025-05-08 PROBLEM — E11.8 TYPE 2 DIABETES MELLITUS WITH COMPLICATION (HCC): Status: RESOLVED | Noted: 2019-09-27 | Resolved: 2025-05-08

## 2025-05-08 PROCEDURE — 99214 OFFICE O/P EST MOD 30 MIN: CPT | Performed by: PHYSICIAN ASSISTANT

## 2025-05-08 NOTE — ASSESSMENT & PLAN NOTE
- S/P lap sleeve gastrectomy on 10/21/19 by Dr. Mac.  -Patient is pursuing Conservative Program  -Initial weight loss goal of 5-10% weight loss for improved health  -Screening labs completed and reviewed    Initial:228.2  Last visit: 158.2  Current: 160.2  Change:-68 from initial    She was advised to incorporate regular exercise into her routine to aid in long-term weight maintenance and skin toning.   The importance of maintaining a balanced diet was emphasized. She was encouraged to continue her current medication regimen if she perceives health improvements and enhanced well-being. She was also advised to ensure adequate hydration.      To continue on ozempic 2mg. . Tolerating well. Start weight on 234.6 lb on 11/8/22.  32.6% weight loss. Patient denies personal and family history of MCT and MEN2 tumors. Patient denies personal history of pancreatitis.   - S/P hysterectomy

## 2025-05-08 NOTE — PROGRESS NOTES
Assessment/Plan:    Overweight  - S/P lap sleeve gastrectomy on 10/21/19 by Dr. Mac.  -Patient is pursuing Conservative Program  -Initial weight loss goal of 5-10% weight loss for improved health  -Screening labs completed and reviewed    Initial:228.2  Last visit: 158.2  Current: 160.2  Change:-68 from initial    She was advised to incorporate regular exercise into her routine to aid in long-term weight maintenance and skin toning.   The importance of maintaining a balanced diet was emphasized. She was encouraged to continue her current medication regimen if she perceives health improvements and enhanced well-being. She was also advised to ensure adequate hydration.      To continue on ozempic 2mg. . Tolerating well. Start weight on 234.6 lb on 11/8/22.  32.6% weight loss. Patient denies personal and family history of MCT and MEN2 tumors. Patient denies personal history of pancreatitis.   - S/P hysterectomy      Type 2 diabetes mellitus without complication (HCC)    Lab Results   Component Value Date    HGBA1C 5.6 08/05/2024   On ozempic 2mg and doing well.  Blood sugar is controlled    Postsurgical malabsorption  OD for followup but is up to date with labs. Will schedule next visit with surgical provider.  She has been hesitant as insurance is out of network    GERD (gastroesophageal reflux disease)  She reports no current issues with heartburn and continues to take omeprazole once a day. Her last EGD was in 2022.    Pancreatic cyst  Following with [facility] pancreatitic cyst specialist        Return in about 8 months (around 1/8/2026) for annual/med f/u with cierra.     Diagnoses and all orders for this visit:    Overweight    Diabetes mellitus, new onset (HCC)  -     semaglutide, 2 mg/dose, (Ozempic, 2 MG/DOSE,) 8 mg/ mL injection pen; Inject 0.75 mL (2 mg total) under the skin every 7 days    Type 2 diabetes mellitus without complication (HCC)    Postsurgical malabsorption    GERD (gastroesophageal reflux  disease)    Pancreatic cyst        Assessment & Plan        Subjective:   Chief Complaint   Patient presents with    Follow-up     Mwm 7mf/u: waist: 37in.        Patient ID: Rosita Yuen  is a 59 y.o. female with excess weight/obesity here to pursue weight managment.  Patient is pursuing Conservative Program.     HPI  History of Present Illness  The patient presents for weight management.     She was on a course of prednisone last week, which led to an increase in her appetite. Her weight fluctuates between 153 and 157 pounds, with 153 being the lowest recorded weight. She feels that her current weight is sustainable and manageable with Ozempic, which she continues to take. She is comfortable with her current weight and does not feel the need to lose more. The ozempic 2mg is controlling her appetite and blood sugar    She has not been engaging in much physical activity recently due to the winter season and work commitments. She plans to increase her activity levels now that the weather is improving. She has an elliptical machine at home and intends to use it more frequently.     She maintains good hydration and consumes nectar for protein and water.   Diet Recall:  Her diet includes Greek yogurt or egg cups for breakfast, salad with chicken or ham and cheese for lunch, and a protein source with salad, vegetables, and starch for dinner. She also consumes berries, strawberries, raspberries, grapes occasionally, and shrimp. She enjoys dining out and often brings home leftovers for subsequent meals.        Wt Readings from Last 10 Encounters:   05/08/25 72.7 kg (160 lb 3.2 oz)   10/08/24 71.8 kg (158 lb 3.2 oz)   04/10/24 77 kg (169 lb 12.8 oz)   12/14/23 83.5 kg (184 lb)   12/09/23 83.5 kg (184 lb)   12/07/23 83 kg (183 lb)   10/04/23 89.4 kg (197 lb)   08/09/23 89.4 kg (197 lb)   06/16/23 94.6 kg (208 lb 8 oz)   05/04/23 98.1 kg (216 lb 3.2 oz)       The following portions of the patient's history were reviewed and  updated as appropriate: She  has a past medical history of Acute bronchitis, Anxiety, Arthritis, Asthma, Bariatric surgery status, Continuous positive airway pressure dependence, COVID-19 vaccine series completed (2021), CPAP (continuous positive airway pressure) dependence, Depression, Diabetes mellitus, GERD (gastroesophageal reflux disease), Hyperlipidemia, Hypertension, Morbid obesity, Postsurgical malabsorption, and Sleep apnea.  She   Patient Active Problem List    Diagnosis Date Noted    Pancreatic cyst 2025    Hypokalemia 2023    Abnormal radiologic findings on diagnostic imaging of renal pelvis, ureter, or bladder 2023    Status post laparoscopic sleeve gastrectomy 2022    Overweight 2020    Encounter for surgical aftercare following surgery of digestive system 2020    GERD (gastroesophageal reflux disease) 2020    Bariatric surgery status 2020    Postsurgical malabsorption 2020    Sleep apnea 2019    Preprocedural cardiovascular examination 2019    Knee pain 2018    Obstructive sleep apnea treated with continuous positive airway pressure (CPAP)     Type 2 diabetes mellitus without complication, without long-term current use of insulin (HCC) 2018    Essential hypertension 2018    Depression 2018    Type 2 diabetes mellitus without complication (HCC) 2016    Dysmetabolic syndrome X 01/10/2011    Mixed hyperlipidemia 01/10/2011    Bipolar affective disorder (HCC) 2010    Extrinsic asthma 2010     She  has a past surgical history that includes Cholecystectomy; Hysterectomy; Knee arthroscopy (Bilateral);  section; EGD; [place] tooth extraction; Breast surgery; and pr laps gstrc rstrictiv px longitudinal gastrectomy (N/A, 10/21/2019).  Her family history includes Brain cancer in her mother; Breast cancer in her mother; Diabetes in her father; Heart disease in her father; Hypertension in her  father and mother; Melanoma in her father; Prostate cancer in her father; Skin cancer in her father.  She  reports that she has never smoked. She has never used smokeless tobacco. She reports that she does not currently use alcohol. She reports that she does not use drugs.  Current Outpatient Medications   Medication Sig Dispense Refill    ALPRAZolam (XANAX) 0.25 mg tablet Take 0.25 mg by mouth as needed      amLODIPine (NORVASC) 10 mg tablet Take 10 mg by mouth daily      Calcium Carb-Cholecalciferol (CALCIUM 500 + D PO) Take by mouth      chlorthalidone 25 mg tablet       diclofenac sodium (VOLTAREN) 1 % Apply 2 g topically if needed      glucose blood test strip       glucose blood test strip       lamoTRIgine (LaMICtal) 200 MG tablet Take 200 mg by mouth daily       losartan (COZAAR) 100 MG tablet       multivitamin (THERAGRAN) TABS Take 1 tablet by mouth daily      omeprazole (PriLOSEC) 20 mg delayed release capsule Take 1 capsule (20 mg total) by mouth 2 (two) times a day 180 capsule 2    semaglutide, 2 mg/dose, (Ozempic, 2 MG/DOSE,) 8 mg/ mL injection pen Inject 0.75 mL (2 mg total) under the skin every 7 days 3 mL 8    venlafaxine (EFFEXOR-XR) 150 mg 24 hr capsule       venlafaxine (EFFEXOR-XR) 75 mg 24 hr capsule Take 75 mg by mouth daily      albuterol (PROVENTIL HFA,VENTOLIN HFA) 90 mcg/act inhaler Inhale 2 puffs as needed (Patient not taking: Reported on 10/8/2024)      Multiple Vitamins-Minerals (Oncovite) TABS Take 1 tablet by mouth daily (Patient not taking: Reported on 5/8/2025)      potassium chloride (K-DUR,KLOR-CON) 20 mEq tablet Take 1 tablet (20 mEq total) by mouth daily (Patient not taking: Reported on 10/8/2024) 30 tablet 0    potassium chloride (Klor-Con) 10 mEq tablet  (Patient not taking: Reported on 5/8/2025)       No current facility-administered medications for this visit.     Current Outpatient Medications on File Prior to Visit   Medication Sig    ALPRAZolam (XANAX) 0.25 mg tablet Take  0.25 mg by mouth as needed    amLODIPine (NORVASC) 10 mg tablet Take 10 mg by mouth daily    Calcium Carb-Cholecalciferol (CALCIUM 500 + D PO) Take by mouth    chlorthalidone 25 mg tablet     diclofenac sodium (VOLTAREN) 1 % Apply 2 g topically if needed    glucose blood test strip     glucose blood test strip     lamoTRIgine (LaMICtal) 200 MG tablet Take 200 mg by mouth daily     losartan (COZAAR) 100 MG tablet     multivitamin (THERAGRAN) TABS Take 1 tablet by mouth daily    omeprazole (PriLOSEC) 20 mg delayed release capsule Take 1 capsule (20 mg total) by mouth 2 (two) times a day    venlafaxine (EFFEXOR-XR) 150 mg 24 hr capsule     venlafaxine (EFFEXOR-XR) 75 mg 24 hr capsule Take 75 mg by mouth daily    [DISCONTINUED] semaglutide, 2 mg/dose, (Ozempic, 2 MG/DOSE,) 8 mg/ mL injection pen Inject 0.75 mL (2 mg total) under the skin every 7 days    albuterol (PROVENTIL HFA,VENTOLIN HFA) 90 mcg/act inhaler Inhale 2 puffs as needed (Patient not taking: Reported on 10/8/2024)    Multiple Vitamins-Minerals (Oncovite) TABS Take 1 tablet by mouth daily (Patient not taking: Reported on 5/8/2025)    potassium chloride (K-DUR,KLOR-CON) 20 mEq tablet Take 1 tablet (20 mEq total) by mouth daily (Patient not taking: Reported on 10/8/2024)    potassium chloride (Klor-Con) 10 mEq tablet  (Patient not taking: Reported on 5/8/2025)     No current facility-administered medications on file prior to visit.     She is allergic to amoxicillin, amoxicillin-pot clavulanate, oxycodone-acetaminophen, sulfa antibiotics, erythromycin, and wound dressings..    Review of Systems   Constitutional:  Negative for fever.   Respiratory:  Negative for shortness of breath.    Cardiovascular:  Negative for chest pain and palpitations.   Gastrointestinal:  Negative for abdominal pain, constipation, diarrhea and vomiting.   Genitourinary:  Negative for difficulty urinating.   Skin:  Negative for rash.   Neurological:  Negative for headaches.  "  Psychiatric/Behavioral:  Negative for dysphoric mood. The patient is not nervous/anxious.        Objective:    /72   Pulse 75   Temp 97.6 °F (36.4 °C)   Resp 16   Ht 5' 4\" (1.626 m)   Wt 72.7 kg (160 lb 3.2 oz)   BMI 27.50 kg/m²      Physical Exam  Vitals and nursing note reviewed.   Constitutional:       General: She is not in acute distress.     Appearance: Normal appearance. She is well-developed.   HENT:      Head: Normocephalic and atraumatic.   Eyes:      Conjunctiva/sclera: Conjunctivae normal.   Neck:      Thyroid: No thyromegaly.   Pulmonary:      Effort: Pulmonary effort is normal. No respiratory distress.   Skin:     Findings: No rash (visible).   Neurological:      Mental Status: She is alert and oriented to person, place, and time.   Psychiatric:         Behavior: Behavior normal.         "

## 2025-05-08 NOTE — ASSESSMENT & PLAN NOTE
Lab Results   Component Value Date    HGBA1C 5.6 08/05/2024   On ozempic 2mg and doing well.  Blood sugar is controlled

## 2025-05-08 NOTE — ASSESSMENT & PLAN NOTE
OD for followup but is up to date with labs. Will schedule next visit with surgical provider.  She has been hesitant as insurance is out of network

## (undated) DEVICE — TUBING SUCTION 5MM X 12 FT

## (undated) DEVICE — TROCAR: Brand: KII FIOS FIRST ENTRY

## (undated) DEVICE — HARMONIC 1100 SHEARS, 36CM SHAFT LENGTH: Brand: HARMONIC

## (undated) DEVICE — DRAPE EQUIPMENT RF WAND

## (undated) DEVICE — VISIGI 3D®  CALIBRATION SYSTEM  SIZE 36FR SLEEVE/STD: Brand: BOEHRINGER® VISIGI 3D™ SLEEVE GASTRECTOMY CALIBRATION SYSTEM, SIZE 36FR

## (undated) DEVICE — LAPAROSCOPIC TROCAR SLEEVE/SINGLE USE: Brand: KII® SLEEVE

## (undated) DEVICE — ENDOPATH 5MM CURVED SCISSORS WITH MONOPOLAR CAUTERY: Brand: ENDOPATH

## (undated) DEVICE — CHLORAPREP HI-LITE 26ML ORANGE

## (undated) DEVICE — SYRINGE 30ML LL

## (undated) DEVICE — COVIDIEN GIA BLACK (XTRA THICK) RELOAD

## (undated) DEVICE — WEBRIL 6 IN UNSTERILE

## (undated) DEVICE — COVIDIEN ENDO GIA PURPLE (MED) RELOAD 45MM

## (undated) DEVICE — [HIGH FLOW INSUFFLATOR,  DO NOT USE IF PACKAGE IS DAMAGED,  KEEP DRY,  KEEP AWAY FROM SUNLIGHT,  PROTECT FROM HEAT AND RADIOACTIVE SOURCES.]: Brand: PNEUMOSURE

## (undated) DEVICE — ASTOUND STANDARD SURGICAL GOWN, XL: Brand: CONVERTORS

## (undated) DEVICE — GLOVE PI ULTRA TOUCH SZ.7.0

## (undated) DEVICE — URETERAL CATHETER ADAPTOR TIP

## (undated) DEVICE — VIOLET BRAIDED (POLYGLACTIN 910), SYNTHETIC ABSORBABLE SUTURE: Brand: COATED VICRYL

## (undated) DEVICE — SCD SEQUENTIAL COMPRESSION COMFORT SLEEVE MEDIUM KNEE LENGTH: Brand: KENDALL SCD

## (undated) DEVICE — TROCARS: Brand: KII® OPTICAL ACCESS SYSTEM

## (undated) DEVICE — GLOVE INDICATOR PI UNDERGLOVE SZ 7 BLUE

## (undated) DEVICE — COVIDIEN ENDO GIA PURPLE (MED) RELOAD 60MM

## (undated) DEVICE — TUBING SMOKE EVAC W/FILTRATION DEVICE PLUMEPORT ACTIV

## (undated) DEVICE — TROCAR VISIPORT

## (undated) DEVICE — VISUALIZATION SYSTEM: Brand: CLEARIFY

## (undated) DEVICE — PENCIL ELECTROSURG E-Z CLEAN -0035H

## (undated) DEVICE — INTENDED FOR TISSUE SEPARATION, AND OTHER PROCEDURES THAT REQUIRE A SHARP SURGICAL BLADE TO PUNCTURE OR CUT.: Brand: BARD-PARKER SAFETY BLADES SIZE 15, STERILE

## (undated) DEVICE — GLOVE SRG BIOGEL 8

## (undated) DEVICE — TIBURON LAPAROSCOPIC ABDOMINAL DRAPE: Brand: CONVERTORS

## (undated) DEVICE — NEEDLE SPINAL18G X 3.5 IN QUINCKE

## (undated) DEVICE — SYRINGE 20ML LL

## (undated) DEVICE — ALLENTOWN LAP CHOLE APP PACK: Brand: CARDINAL HEALTH

## (undated) DEVICE — SUT MONOCRYL 4-0 PS-2 27 IN Y426H

## (undated) DEVICE — IRRIG ENDO FLO TUBING

## (undated) DEVICE — POWER SHELL SIGNIA

## (undated) DEVICE — PMI DISPOSABLE PUNCTURE CLOSURE DEVICE / SUTURE GRASPER: Brand: PMI

## (undated) DEVICE — ADHESIVE SKIN CLSR DERMABOND NX

## (undated) DEVICE — TRAVELKIT CONTAINS FIRST STEP KIT (200ML EP-4 KIT) AND SOILED SCOPE BAG - 1 KIT: Brand: TRAVELKIT CONTAINS FIRST STEP KIT AND SOILED SCOPE BAG

## (undated) DEVICE — 3000CC GUARDIAN II: Brand: GUARDIAN